# Patient Record
Sex: FEMALE | Race: BLACK OR AFRICAN AMERICAN | Employment: UNEMPLOYED | URBAN - METROPOLITAN AREA
[De-identification: names, ages, dates, MRNs, and addresses within clinical notes are randomized per-mention and may not be internally consistent; named-entity substitution may affect disease eponyms.]

---

## 2017-10-09 ENCOUNTER — HOSPITAL ENCOUNTER (EMERGENCY)
Facility: CLINIC | Age: 52
Discharge: HOME | End: 2017-10-09
Attending: EMERGENCY MEDICINE | Admitting: EMERGENCY MEDICINE
Payer: COMMERCIAL

## 2017-10-09 ENCOUNTER — APPOINTMENT (OUTPATIENT)
Dept: ULTRASOUND IMAGING | Facility: CLINIC | Age: 52
End: 2017-10-09
Attending: EMERGENCY MEDICINE
Payer: COMMERCIAL

## 2017-10-09 VITALS
HEART RATE: 61 BPM | TEMPERATURE: 97.9 F | RESPIRATION RATE: 16 BRPM | OXYGEN SATURATION: 99 % | DIASTOLIC BLOOD PRESSURE: 91 MMHG | SYSTOLIC BLOOD PRESSURE: 148 MMHG

## 2017-10-09 DIAGNOSIS — A59.00 GU INFECTION, TRICHOMONAL: ICD-10-CM

## 2017-10-09 DIAGNOSIS — R10.13 ABDOMINAL PAIN, EPIGASTRIC: ICD-10-CM

## 2017-10-09 DIAGNOSIS — R11.10 NON-INTRACTABLE VOMITING, PRESENCE OF NAUSEA NOT SPECIFIED, UNSPECIFIED VOMITING TYPE: ICD-10-CM

## 2017-10-09 LAB
ALBUMIN SERPL-MCNC: 3.9 G/DL (ref 3.4–5)
ALBUMIN UR-MCNC: NEGATIVE MG/DL
ALP SERPL-CCNC: 115 U/L (ref 40–150)
ALT SERPL W P-5'-P-CCNC: 20 U/L (ref 0–50)
AMORPH CRY #/AREA URNS HPF: ABNORMAL /HPF
ANION GAP SERPL CALCULATED.3IONS-SCNC: 7 MMOL/L (ref 3–14)
APPEARANCE UR: ABNORMAL
AST SERPL W P-5'-P-CCNC: 13 U/L (ref 0–45)
BACTERIA #/AREA URNS HPF: ABNORMAL /HPF
BASOPHILS # BLD AUTO: 0 10E9/L (ref 0–0.2)
BASOPHILS NFR BLD AUTO: 0.6 %
BILIRUB SERPL-MCNC: 0.3 MG/DL (ref 0.2–1.3)
BILIRUB UR QL STRIP: NEGATIVE
BUN SERPL-MCNC: 9 MG/DL (ref 7–30)
CALCIUM SERPL-MCNC: 9.1 MG/DL (ref 8.5–10.1)
CHLORIDE SERPL-SCNC: 104 MMOL/L (ref 94–109)
CO2 SERPL-SCNC: 28 MMOL/L (ref 20–32)
COLOR UR AUTO: YELLOW
CREAT SERPL-MCNC: 0.68 MG/DL (ref 0.52–1.04)
DIFFERENTIAL METHOD BLD: NORMAL
EOSINOPHIL # BLD AUTO: 0.1 10E9/L (ref 0–0.7)
EOSINOPHIL NFR BLD AUTO: 2.3 %
ERYTHROCYTE [DISTWIDTH] IN BLOOD BY AUTOMATED COUNT: 13 % (ref 10–15)
GFR SERPL CREATININE-BSD FRML MDRD: >90 ML/MIN/1.7M2
GLUCOSE SERPL-MCNC: 101 MG/DL (ref 70–99)
GLUCOSE UR STRIP-MCNC: NEGATIVE MG/DL
HCT VFR BLD AUTO: 41.5 % (ref 35–47)
HGB BLD-MCNC: 14.7 G/DL (ref 11.7–15.7)
HGB UR QL STRIP: ABNORMAL
IMM GRANULOCYTES # BLD: 0 10E9/L (ref 0–0.4)
IMM GRANULOCYTES NFR BLD: 0.2 %
INTERPRETATION ECG - MUSE: NORMAL
KETONES UR STRIP-MCNC: NEGATIVE MG/DL
LEUKOCYTE ESTERASE UR QL STRIP: ABNORMAL
LIPASE SERPL-CCNC: 323 U/L (ref 73–393)
LYMPHOCYTES # BLD AUTO: 1 10E9/L (ref 0.8–5.3)
LYMPHOCYTES NFR BLD AUTO: 18.7 %
MCH RBC QN AUTO: 29.5 PG (ref 26.5–33)
MCHC RBC AUTO-ENTMCNC: 35.4 G/DL (ref 31.5–36.5)
MCV RBC AUTO: 83 FL (ref 78–100)
MONOCYTES # BLD AUTO: 0.4 10E9/L (ref 0–1.3)
MONOCYTES NFR BLD AUTO: 7.3 %
MUCOUS THREADS #/AREA URNS LPF: PRESENT /LPF
NEUTROPHILS # BLD AUTO: 3.7 10E9/L (ref 1.6–8.3)
NEUTROPHILS NFR BLD AUTO: 70.9 %
NITRATE UR QL: NEGATIVE
NON-SQ EPI CELLS #/AREA URNS LPF: ABNORMAL /LPF
NRBC # BLD AUTO: 0 10*3/UL
NRBC BLD AUTO-RTO: 0 /100
PH UR STRIP: 8.5 PH (ref 5–7)
PLATELET # BLD AUTO: 219 10E9/L (ref 150–450)
POTASSIUM SERPL-SCNC: 3.4 MMOL/L (ref 3.4–5.3)
PROT SERPL-MCNC: 8.8 G/DL (ref 6.8–8.8)
RBC # BLD AUTO: 4.99 10E12/L (ref 3.8–5.2)
RBC #/AREA URNS AUTO: ABNORMAL /HPF
SODIUM SERPL-SCNC: 139 MMOL/L (ref 133–144)
SOURCE: ABNORMAL
SP GR UR STRIP: 1.02 (ref 1–1.03)
TRICHOMONAS #/AREA URNS HPF: PRESENT /HPF
TROPONIN I SERPL-MCNC: <0.015 UG/L (ref 0–0.04)
UROBILINOGEN UR STRIP-ACNC: 1 EU/DL (ref 0.2–1)
WBC # BLD AUTO: 5.2 10E9/L (ref 4–11)
WBC #/AREA URNS AUTO: ABNORMAL /HPF

## 2017-10-09 PROCEDURE — 80053 COMPREHEN METABOLIC PANEL: CPT | Performed by: EMERGENCY MEDICINE

## 2017-10-09 PROCEDURE — 96375 TX/PRO/DX INJ NEW DRUG ADDON: CPT

## 2017-10-09 PROCEDURE — 83690 ASSAY OF LIPASE: CPT | Performed by: EMERGENCY MEDICINE

## 2017-10-09 PROCEDURE — 84484 ASSAY OF TROPONIN QUANT: CPT | Performed by: EMERGENCY MEDICINE

## 2017-10-09 PROCEDURE — 25000128 H RX IP 250 OP 636: Performed by: EMERGENCY MEDICINE

## 2017-10-09 PROCEDURE — 85025 COMPLETE CBC W/AUTO DIFF WBC: CPT | Performed by: EMERGENCY MEDICINE

## 2017-10-09 PROCEDURE — 96361 HYDRATE IV INFUSION ADD-ON: CPT

## 2017-10-09 PROCEDURE — 81001 URINALYSIS AUTO W/SCOPE: CPT | Performed by: EMERGENCY MEDICINE

## 2017-10-09 PROCEDURE — 76705 ECHO EXAM OF ABDOMEN: CPT

## 2017-10-09 PROCEDURE — 99285 EMERGENCY DEPT VISIT HI MDM: CPT | Mod: 25

## 2017-10-09 PROCEDURE — 96374 THER/PROPH/DIAG INJ IV PUSH: CPT

## 2017-10-09 RX ORDER — SODIUM CHLORIDE 9 MG/ML
1000 INJECTION, SOLUTION INTRAVENOUS CONTINUOUS
Status: DISCONTINUED | OUTPATIENT
Start: 2017-10-09 | End: 2017-10-09 | Stop reason: HOSPADM

## 2017-10-09 RX ORDER — METRONIDAZOLE 500 MG/1
500 TABLET ORAL 2 TIMES DAILY
Qty: 28 TABLET | Refills: 0 | Status: SHIPPED | OUTPATIENT
Start: 2017-10-09 | End: 2017-10-23

## 2017-10-09 RX ORDER — ONDANSETRON 4 MG/1
4 TABLET, ORALLY DISINTEGRATING ORAL EVERY 6 HOURS PRN
Qty: 10 TABLET | Refills: 0 | Status: SHIPPED | OUTPATIENT
Start: 2017-10-09 | End: 2018-06-10

## 2017-10-09 RX ORDER — ONDANSETRON 2 MG/ML
4 INJECTION INTRAMUSCULAR; INTRAVENOUS ONCE
Status: COMPLETED | OUTPATIENT
Start: 2017-10-09 | End: 2017-10-09

## 2017-10-09 RX ORDER — MORPHINE SULFATE 4 MG/ML
4 INJECTION, SOLUTION INTRAMUSCULAR; INTRAVENOUS ONCE
Status: COMPLETED | OUTPATIENT
Start: 2017-10-09 | End: 2017-10-09

## 2017-10-09 RX ADMIN — ONDANSETRON 4 MG: 2 SOLUTION INTRAMUSCULAR; INTRAVENOUS at 15:32

## 2017-10-09 RX ADMIN — MORPHINE SULFATE 4 MG: 4 INJECTION, SOLUTION INTRAMUSCULAR; INTRAVENOUS at 15:33

## 2017-10-09 RX ADMIN — SODIUM CHLORIDE 1000 ML: 9 INJECTION, SOLUTION INTRAVENOUS at 15:32

## 2017-10-09 ASSESSMENT — ENCOUNTER SYMPTOMS
VOMITING: 1
PALPITATIONS: 0
ABDOMINAL PAIN: 1
SHORTNESS OF BREATH: 0

## 2017-10-09 NOTE — ED AVS SNAPSHOT
Emergency Department    [address] [facility] [address]    Phone:  [phone]    Fax:  [phone]                                       Annika Garrido   MRN: [id number]    Department:   Emergency Department   Date of Visit:  10/9/2017           Patient Information     Date Of Birth          1965        Your diagnoses for this visit were:     Non-intractable vomiting, presence of nausea not specified, unspecified vomiting type     Abdominal pain, epigastric      infection, trichomonal        You were seen by Trierweiler, Chad A, MD.      Follow-up Information     Follow up with Clinic, [facility] In 2 days.    Contact information:    [address] [facility] [address] [phone]          Follow up with  Emergency Department.    Specialty:  EMERGENCY MEDICINE    Why:  If symptoms worsen    Contact information:    [address] [facility] [address] [phone]        Discharge Instructions       Discharge Instructions  Vomiting    You have been seen today for vomiting (throwing up). This is usually caused by a virus, but some bacteria, parasites, medicines or other medical conditions can cause similar symptoms. At this time your provider does not find that your vomiting is a sign of anything dangerous or life-threatening. However, sometimes the signs of serious illness do not show up right away. If you have new or worse symptoms, you may need to be seen again in the Emergency Department or by your primary provider. Remember that serious problems like appendicitis can start as vomiting.    Generally, every Emergency Department visit should have a follow-up clinic visit with either a primary or a specialty clinic/provider. Please follow-up as instructed by your emergency provider today.    Return to the Emergency Department if:    You keep vomiting and you are not able to keep liquids down.     You feel you are getting dehydrated, such as being very  thirsty, not urinating (peeing) at least every 8-12 hours, or feeling faint or lightheaded.     You develop a new fever, or your fever continues for more than 2 days.     You have abdominal (belly pain) that seems worse than cramps, is in one spot, or is getting worse over time. Appendicitis usually causes pain in the right lower abdomen (to the right and below your belly button) so watch for pain in this location.    You have blood in your vomit or stools.     You feel very weak.    You are not starting to improve within 24 hours of your visit here.     What can I do to help myself?    The most important thing to do is to drink clear liquids. If you have been vomiting a lot, it is best to have only small, frequent sips of liquids. Drinking too much at once may cause more vomiting. If you are vomiting often, you must replace minerals, sodium and potassium lost with your illness. Pedialyte  is the best available rehydration liquid but some find that it doesn t taste good so sports drinks are an alterative. You can also drink clear liquids such as water, weak tea, apple juice, and 7-Up . Avoid acid liquids (orange), caffeine (coffee) or alcohol. Do not drink milk until you no longer have diarrhea (loose stools).     After liquids are staying down, you may start eating mild foods. Soda crackers, toast, plain noodles, gelatin, applesauce and bananas are good first choices. Avoid foods that have acid, are spicy, fatty or have a lot of fiber (such as meats, coarse grains, vegetables). You may start eating these foods again in about 3 days when you are better.     Sometimes treatment includes prescription medicine to prevent nausea (sick to your stomach) and vomiting. If your provider prescribes these for you, take them as directed.     Do not take ibuprofen, naproxen, or other nonsteroidal anti-inflammatory (NSAID) medicines without checking with your healthcare provider.     If you were given a prescription for medicine  here today, be sure to read all of the information (including the package insert) that comes with your prescription.  This will include important information about the medicine, its side effects, and any warnings that you need to know about.  The pharmacist who fills the prescription can provide more information and answer questions you may have about the medicine.  If you have questions or concerns that the pharmacist cannot address, please call or return to the Emergency Department.     Remember that you can always come back to the Emergency Department if you are not able to see your regular provider in the amount of time listed above, if you get any new symptoms, or if there is anything that worries you.      Discharge References/Attachments     GASTRITIS (ADULT) (ENGLISH)      24 Hour Appointment Hotline       To make an appointment at any [facility], call [phone] ([phone]). If you don't have a family doctor or clinic, we will help you find one. [place] clinics are conveniently located to serve the needs of you and your family.             Review of your medicines      START taking        Dose / Directions Last dose taken    metroNIDAZOLE 500 MG tablet   Commonly known as:  FLAGYL   Dose:  500 mg   Quantity:  28 tablet        Take 1 tablet (500 mg) by mouth 2 times daily for 14 days   Refills:  0        ondansetron 4 MG ODT tab   Commonly known as:  ZOFRAN ODT   Dose:  4 mg   Quantity:  10 tablet        Take 1 tablet (4 mg) by mouth every 6 hours as needed   Refills:  0                Prescriptions were sent or printed at these locations (2 Prescriptions)                   Other Prescriptions                Printed at Department/Unit printer (2 of 2)         ondansetron (ZOFRAN ODT) 4 MG ODT tab               metroNIDAZOLE (FLAGYL) 500 MG tablet                Procedures and tests performed during your visit     *UA reflex to Microscopic    Abdomen US, limited (RUQ only)    CBC with platelets  differential    Comprehensive metabolic panel    EKG 12-lead, tracing only    Lipase    Peripheral IV: Standard    Troponin I    Urine Microscopic      Orders Needing Specimen Collection     None      Pending Results     No orders found from 10/7/2017 to 10/10/2017.            Pending Culture Results     No orders found from 10/7/2017 to 10/10/2017.            Pending Results Instructions     If you had any lab results that were not finalized at the time of your Discharge, you can call the ED Lab Result RN at [phone]. You will be contacted by this team for any positive Lab results or changes in treatment. The nurses are available 7 days a week from 10A to 6:30P.  You can leave a message 24 hours per day and they will return your call.        Test Results From Your Hospital Stay        10/9/2017  3:47 PM      Component Results     Component Value Ref Range & Units Status    WBC 5.2 4.0 - 11.0 10e9/L Final    RBC Count 4.99 3.8 - 5.2 10e12/L Final    Hemoglobin 14.7 11.7 - 15.7 g/dL Final    Hematocrit 41.5 35.0 - 47.0 % Final    MCV 83 78 - 100 fl Final    MCH 29.5 26.5 - 33.0 pg Final    MCHC 35.4 31.5 - 36.5 g/dL Final    RDW 13.0 10.0 - 15.0 % Final    Platelet Count 219 150 - 450 10e9/L Final    Diff Method Automated Method  Final    % Neutrophils 70.9 % Final    % Lymphocytes 18.7 % Final    % Monocytes 7.3 % Final    % Eosinophils 2.3 % Final    % Basophils 0.6 % Final    % Immature Granulocytes 0.2 % Final    Nucleated RBCs 0 0 /100 Final    Absolute Neutrophil 3.7 1.6 - 8.3 10e9/L Final    Absolute Lymphocytes 1.0 0.8 - 5.3 10e9/L Final    Absolute Monocytes 0.4 0.0 - 1.3 10e9/L Final    Absolute Eosinophils 0.1 0.0 - 0.7 10e9/L Final    Absolute Basophils 0.0 0.0 - 0.2 10e9/L Final    Abs Immature Granulocytes 0.0 0 - 0.4 10e9/L Final    Absolute Nucleated RBC 0.0  Final         10/9/2017  4:09 PM      Component Results     Component Value Ref Range & Units Status    Sodium 139 133 - 144 mmol/L Final     Potassium 3.4 3.4 - 5.3 mmol/L Final    Chloride 104 94 - 109 mmol/L Final    Carbon Dioxide 28 20 - 32 mmol/L Final    Anion Gap 7 3 - 14 mmol/L Final    Glucose 101 (H) 70 - 99 mg/dL Final    Urea Nitrogen 9 7 - 30 mg/dL Final    Creatinine 0.68 0.52 - 1.04 mg/dL Final    GFR Estimate >90 >60 mL/min/1.7m2 Final    Non  GFR Calc    GFR Estimate If Black >90 >60 mL/min/1.7m2 Final    African American GFR Calc    Calcium 9.1 8.5 - 10.1 mg/dL Final    Bilirubin Total 0.3 0.2 - 1.3 mg/dL Final    Albumin 3.9 3.4 - 5.0 g/dL Final    Protein Total 8.8 6.8 - 8.8 g/dL Final    Alkaline Phosphatase 115 40 - 150 U/L Final    ALT 20 0 - 50 U/L Final    AST 13 0 - 45 U/L Final         10/9/2017  4:04 PM      Component Results     Component Value Ref Range & Units Status    Lipase 323 73 - 393 U/L Final         10/9/2017  4:09 PM      Component Results     Component Value Ref Range & Units Status    Troponin I ES <0.015 0.000 - 0.045 ug/L Final    The 99th percentile for upper reference range is 0.045 ug/L.  Troponin values   in the range of 0.045 - 0.120 ug/L may be associated with risks of adverse   clinical events.           10/9/2017  4:58 PM      Narrative     ULTRASOUND  ABDOMEN LIMITED 10/9/2017 4:47 PM    HISTORY: 52-year-old woman with epigastric pain.    COMPARISON: None.    FINDINGS: The visible pancreas is unremarkable. The visible abdominal  aorta and IVC unremarkable. No focal liver lesion. Liver length is  15.6 cm. The gallbladder is somewhat elongated, though not overtly  distended. Negative sonographic Arias sign. Gallbladder wall  thickness is 2 mm. No visible cholelithiasis. Common hepatic duct is  2.3 mm. The right kidney is 12.3 cm in length with AP cortical  thickness of 1.2 cm.         Impression     IMPRESSION: Normal exam.    ENA MERCHANT MD         10/9/2017  4:27 PM      Component Results     Component Value Ref Range & Units Status    Color Urine Yellow  Final    Appearance Urine  Slightly Cloudy  Final    Glucose Urine Negative NEG^Negative mg/dL Final    Bilirubin Urine Negative NEG^Negative Final    Ketones Urine Negative NEG^Negative mg/dL Final    Specific Gravity Urine 1.020 1.003 - 1.035 Final    Blood Urine Trace (A) NEG^Negative Final    pH Urine 8.5 (H) 5.0 - 7.0 pH Final    Protein Albumin Urine Negative NEG^Negative mg/dL Final    Urobilinogen Urine 1.0 0.2 - 1.0 EU/dL Final    Nitrite Urine Negative NEG^Negative Final    Leukocyte Esterase Urine Moderate (A) NEG^Negative Final    Source Midstream Urine  Final         10/9/2017  4:34 PM      Component Results     Component Value Ref Range & Units Status    WBC Urine O - 2 OTO2^O - 2 /HPF Final    RBC Urine O - 2 OTO2^O - 2 /HPF Final    Squamous Epithelial /LPF Urine Many (A) FEW^Few /LPF Final    Bacteria Urine Many (A) NEG^Negative /HPF Final    Amorphous Crystals Many (A) NEG^Negative /HPF Final    Mucous Urine Present (A) NEG^Negative /LPF Final    Trichomonas Present (A) NEG^Negative /HPF Final                Clinical Quality Measure: Blood Pressure Screening     Your blood pressure was checked while you were in the emergency department today. The last reading we obtained was  BP: (!) 157/99 . Please read the guidelines below about what these numbers mean and what you should do about them.  If your systolic blood pressure (the top number) is less than 120 and your diastolic blood pressure (the bottom number) is less than 80, then your blood pressure is normal. There is nothing more that you need to do about it.  If your systolic blood pressure (the top number) is 120-139 or your diastolic blood pressure (the bottom number) is 80-89, your blood pressure may be higher than it should be. You should have your blood pressure rechecked within a year by a primary care provider.  If your systolic blood pressure (the top number) is 140 or greater or your diastolic blood pressure (the bottom number) is 90 or greater, you may have high  "blood pressure. High blood pressure is treatable, but if left untreated over time it can put you at risk for heart attack, stroke, or kidney failure. You should have your blood pressure rechecked by a primary care provider within the next 4 weeks.  If your provider in the emergency department today gave you specific instructions to follow-up with your doctor or provider even sooner than that, you should follow that instruction and not wait for up to 4 weeks for your follow-up visit.        Thank you for choosing [facility]       Thank you for choosing [facility] for your care. Our goal is always to provide you with excellent care. Hearing back from our patients is one way we can continue to improve our services. Please take a few minutes to complete the written survey that you may receive in the mail after you visit with us. Thank you!        [employer] Information     [employer] lets you send messages to your doctor, view your test results, renew your prescriptions, schedule appointments and more. To sign up, go to [email or web address] . Click on \"Log in\" on the left side of the screen, which will take you to the Welcome page. Then click on \"Sign up Now\" on the right side of the page.     You will be asked to enter the access code listed below, as well as some personal information. Please follow the directions to create your username and password.     Your access code is: [email or web address]  Expires: 2018  5:03 PM     Your access code will  in 90 days. If you need help or a new code, please call your [facility] clinic or [phone].        Care EveryWhere ID     This is your Care EveryWhere ID. This could be used by other organizations to access your [facility] medical records  [id number]        Equal Access to Services     CASS HERNANDEZ : eneida Goodman, sana bowden. So [facility] [phone].    ATENCIÓN: Si habla español, tiene a spann " disposición servicios gratuitos de asistencia lingüística. Rajan al [phone].    We comply with applicable federal civil rights laws and [place] laws. We do not discriminate on the basis of race, color, national origin, age, disability, sex, sexual orientation, or gender identity.            After Visit Summary       This is your record. Keep this with you and show to your community pharmacist(s) and doctor(s) at your next visit.

## 2017-10-09 NOTE — LETTER
To Whom it may concern:      Annika Percy was seen in our Emergency Department today, 10/09/17.  Her  was here with her and missed work.  Please excuse him from work today.    Sincerely,        Chad A. Trierweiler, MD

## 2017-10-09 NOTE — ED AVS SNAPSHOT
Emergency Department    [address] [place] [address]    Phone:  [phone]    Fax:  [phone]                                       Annika Garrido   MRN: [id number]    Department:   Emergency Department   Date of Visit:  10/9/2017           After Visit Summary Signature Page     I have received my discharge instructions, and my questions have been answered. I have discussed any challenges I see with this plan with the nurse or doctor.    ..........................................................................................................................................  Patient/Patient Representative Signature      ..........................................................................................................................................  Patient Representative Print Name and Relationship to Patient    ..................................................               ................................................  Date                                            Time    ..........................................................................................................................................  Reviewed by Signature/Title    ...................................................              ..............................................  Date                                                            Time

## 2017-10-09 NOTE — DISCHARGE INSTRUCTIONS
Discharge Instructions  Vomiting    You have been seen today for vomiting (throwing up). This is usually caused by a virus, but some bacteria, parasites, medicines or other medical conditions can cause similar symptoms. At this time your provider does not find that your vomiting is a sign of anything dangerous or life-threatening. However, sometimes the signs of serious illness do not show up right away. If you have new or worse symptoms, you may need to be seen again in the Emergency Department or by your primary provider. Remember that serious problems like appendicitis can start as vomiting.    Generally, every Emergency Department visit should have a follow-up clinic visit with either a primary or a specialty clinic/provider. Please follow-up as instructed by your emergency provider today.    Return to the Emergency Department if:    You keep vomiting and you are not able to keep liquids down.     You feel you are getting dehydrated, such as being very thirsty, not urinating (peeing) at least every 8-12 hours, or feeling faint or lightheaded.     You develop a new fever, or your fever continues for more than 2 days.     You have abdominal (belly pain) that seems worse than cramps, is in one spot, or is getting worse over time. Appendicitis usually causes pain in the right lower abdomen (to the right and below your belly button) so watch for pain in this location.    You have blood in your vomit or stools.     You feel very weak.    You are not starting to improve within 24 hours of your visit here.     What can I do to help myself?    The most important thing to do is to drink clear liquids. If you have been vomiting a lot, it is best to have only small, frequent sips of liquids. Drinking too much at once may cause more vomiting. If you are vomiting often, you must replace minerals, sodium and potassium lost with your illness. Pedialyte  is the best available rehydration liquid but some find that it doesn t  taste good so sports drinks are an alterative. You can also drink clear liquids such as water, weak tea, apple juice, and 7-Up . Avoid acid liquids (orange), caffeine (coffee) or alcohol. Do not drink milk until you no longer have diarrhea (loose stools).     After liquids are staying down, you may start eating mild foods. Soda crackers, toast, plain noodles, gelatin, applesauce and bananas are good first choices. Avoid foods that have acid, are spicy, fatty or have a lot of fiber (such as meats, coarse grains, vegetables). You may start eating these foods again in about 3 days when you are better.     Sometimes treatment includes prescription medicine to prevent nausea (sick to your stomach) and vomiting. If your provider prescribes these for you, take them as directed.     Do not take ibuprofen, naproxen, or other nonsteroidal anti-inflammatory (NSAID) medicines without checking with your healthcare provider.     If you were given a prescription for medicine here today, be sure to read all of the information (including the package insert) that comes with your prescription.  This will include important information about the medicine, its side effects, and any warnings that you need to know about.  The pharmacist who fills the prescription can provide more information and answer questions you may have about the medicine.  If you have questions or concerns that the pharmacist cannot address, please call or return to the Emergency Department.     Remember that you can always come back to the Emergency Department if you are not able to see your regular provider in the amount of time listed above, if you get any new symptoms, or if there is anything that worries you.

## 2017-10-09 NOTE — ED PROVIDER NOTES
"  History     Chief Complaint:  Epigastric Pain     HPI   Annika Garrido is a 52 year old female who presents to the emergency department today for evaluation of epigastric pain. The patient reports that she had a very busy day and then this afternoon at 1300 she developed some epigastric pain and voimting while she was resting on her couch. She characterizes her pain as a \"squeezing and stabbing\" that radiates from her epigastrium up her chest and through to her back. Here she also indicates some right upper quadrant abdominal pain. She cannot specify any exacerbating or alleviating positions or factors regarding her chest pain. She denies any shortness of breath or palpitations but notes that she has vomited eight times since her chest pain began. Of note, the patient used cocaine last night and had a hot dog and a Honey Bun for breakfast.     Allergies:  No Known Drug Allergies    Medications:    Medications reviewed. No current medications.      Past Medical History:    Asymptomatic human immunodeficiency virus (HIV) infection status    Past Surgical History:    History reviewed. No pertinent surgical history.    Family History:    History reviewed. No pertinent family history.     Social History:  Smoking Status: Current Every Day Smoker -- 0.25 Packs Per Day   Smokeless Tobacco: Never Used  Alcohol Use: Negative     Review of Systems   Respiratory: Negative for shortness of breath.    Cardiovascular: Positive for chest pain (Radiating to her back). Negative for palpitations.   Gastrointestinal: Positive for abdominal pain (RUQ) and vomiting.   All other systems reviewed and are negative.    Physical Exam     Patient Vitals for the past 24 hrs:   BP Temp Temp src Heart Rate Resp SpO2   10/09/17 1453 (!) 157/99 97.9  F (36.6  C) Oral 64 16 99 %     Physical Exam  Eye:  Pupils are equal, round, and reactive.  Extraocular movements intact.    ENT:  No rhinorrhea.  Moist mucus membranes.  Normal tongue and " tonsil.    Cardiac:  Regular rate and rhythm.  No murmurs, gallops, or rubs.    Pulmonary:  Clear to auscultation bilaterally.  No wheezes, rales, or rhonchi.    Abdomen:  Focal tenderness in the epigastrium and right upper quadrant without rebound or guarding.     Musculoskeletal:  Normal movement of all extremities without evidence for deficit.    Skin:  Warm and dry without rashes.    Neurologic:  Non-focal exam without asymmetric weakness or numbness.     Psychiatric:  Normal affect with appropriate interaction with examiner.    Emergency Department Course     ECG:  ECG taken at 1502, ECG read at 1513  Sinus rhythm  Left axis deviation  Voltage criteria for left ventricular hypertrophy  Abnormal ECG  Rate 67 bpm. MI interval 160 ms. QRS duration 104 ms. QT/QTc 418/441 ms. P-R-T axes 51 -32 -1.    Imaging:  Radiology findings were communicated with the patient who voiced understanding of the findings.    Abdomen US, Limited (RUQ only)  Normal exam.  Reading per radiology    Laboratory:  Laboratory findings were communicated with the patient who voiced understanding of the findings.    UA: Blood: Trace (A), pH 8.5 (H), Leukocyte Esterase: Moderate (A)  Urine Microscopic: Squamous Epithelial/LPF: Many (A), Bacteria: Many (A), Amorphous Crystals: Many (A), Mucous: Present (A), Trichomonas: Present (A)   CBC: WBC 5.2, HGB 14.7,   CMP: Glucose 101 (H) o/w WNL (Creatinine 0.68)  Lipase: 323   Troponin I (Collected 1526): <0.015      Interventions:  1532 NS 1000 ml IV   1532 Zofran 4 mg IV   1533 Morphine 4 mg IV    Medications   0.9% sodium chloride BOLUS (1,000 mLs Intravenous New Bag 10/9/17 1532)     Followed by   0.9% sodium chloride infusion (not administered)   morphine (PF) injection 4 mg (4 mg Intravenous Given 10/9/17 1533)   ondansetron (ZOFRAN) injection 4 mg (4 mg Intravenous Given 10/9/17 1532)     Emergency Department Course:    1500 Nursing notes and vitals reviewed.    1513 I performed an exam of  "the patient as documented above.     1534 IV was inserted and blood was drawn for laboratory testing, results above.     1621 The patient was rechecked and updated.     1621 The patient provided a urine sample here in the emergency department. This was sent for laboratory testing, findings above.     1635 The patient was sent for a Abdomen US, limited (RUQ only) while in the emergency department, results above.      I personally reviewed the EKG, laboratory, and imaging results with the patient and answered all related questions prior to discharge.    Impression & Plan      Medical Decision Making:  Annika Garrido is a 52 year old female who presents to the emergency department today for evaluation of fairly sudden vomiting and epigastric pain.  The patient notes that she had a night of partying with her friends.  Upon awakening this morning, she ate \"an old hotdog\" along with a \"honeybun\" and took the bus home.  However, when she arrived home, she suddenly felt very ill with multiple episodes of vomiting and epigastric pain that radiated through her chest.  She was rather uncomfortable on initial assessment with retching.  However, she felt much improved after 1 dose of morphine and Zofran.  On exam, she is only tender in the epigastrium and right upper quadrant.  She has an unremarkable EKG and troponin despite 3 hours of symptoms, and I feel that she would not require further workup for cardiac pathologies.  I also feel PE is unlikely.  She is over the age of 50 and just cannot be ruled out by the PERC role, though I feel she is so low risk that she would not require a d-dimer.  Aortic dissection is also unlikely considering her presentation.  I was more concerned about gastric and biliary causes.  Lipase is normal.  LFTs are normal.  Right upper quadrant ultrasound is negative.  The patient felt much improved and feels comfortable with the plan for discharge home.  My leading supposition is one of food poisoning " considering that she was eating foods that could've been left out overnight after a night of partying.  Nonetheless, at this juncture she feels safe discharge and I feel that is reasonable.  However, I advised close outpatient follow-up as there is some diagnostic uncertainty here and she understands that should be no hesitation to return to us immediately for any worsening of condition or other emergent concerns.    Diagnosis:    ICD-10-CM    1. Non-intractable vomiting, presence of nausea not specified, unspecified vomiting type R11.10    2. Abdominal pain, epigastric R10.13      Disposition:   Discharge to home    Discharge Medications:  None  New Prescriptions    No medications on file     Scribe Disclosure:  I, Clive Shields, am serving as a scribe at 3:11 PM on 10/9/2017 to document services personally performed by Trierweiler, Chad A, MD, based on my observations and the provider's statements to me.     EMERGENCY DEPARTMENT       Trierweiler, Chad A, MD  10/09/17 4814

## 2018-03-07 ENCOUNTER — OFFICE VISIT (OUTPATIENT)
Dept: OBGYN | Facility: CLINIC | Age: 53
End: 2018-03-07
Payer: COMMERCIAL

## 2018-03-07 DIAGNOSIS — Z00.6 EXAMINATION OF PARTICIPANT IN CLINICAL TRIAL: Primary | ICD-10-CM

## 2018-03-07 NOTE — PROGRESS NOTES
This patient was seen as part of a research study.  Vaginal and cervical swabs were obtained as well as two cervical biopsies.  Bleeding was minimal and controlled with silver nitrate.  The patient tolerated the procedure well.  Dr. Plasencia was present for the entire procedure.    Amy Schumer, MD  Obstetrics and Gynecology, PGY-1    I was present for procedure for research study.   Vicki Plasencia MD

## 2018-03-07 NOTE — MR AVS SNAPSHOT
After Visit Summary   3/7/2018    Annika Garrido    MRN: [id number]           Patient Information     Date Of Birth          1965        Visit Information        Provider Department      3/7/2018 1:15 PM Schumer, Amy, MD [facility]        Today's Diagnoses     Examination of participant in clinical trial    -  1       Follow-ups after your visit        Who to contact     Please call your clinic at [phone] to:    Ask questions about your health    Make or cancel appointments    Discuss your medicines    Learn about your test results    Speak to your doctor            Additional Information About Your Visit        MyChart Information     [employer] is an electronic gateway that provides easy, online access to your medical records. With [employer], you can request a clinic appointment, read your test results, renew a prescription or communicate with your care team.     To sign up for [employer] visit the website at [email or web address]   You will be asked to enter the access code listed below, as well as some personal information. Please follow the directions to create your username and password.     Your access code is: [email or web address]  Expires: 5/15/2018  6:30 AM     Your access code will  in 90 days. If you need help or a new code, please contact your [facility] or call [phone] for assistance.        Care EveryWhere ID     This is your Care EveryWhere ID. This could be used by other organizations to access your [place] medical records  [id number]         Blood Pressure from Last 3 Encounters:   10/09/17 (!) 148/91    Weight from Last 3 Encounters:   No data found for Wt              Today, you had the following     No orders found for display       Primary Care Provider Office Phone # Fax #    [facility] [phone]       [address] [place] [id number]        Equal Access to Services      CASS HERNANDEZ : Hadii aad ku dora José, waaxda luqadaha, qaybta kaalmada alexis, sana amrik noelrosa quinnrajendrajuan harman . So [facility] [phone].    ATENCIÓN: Si habla español, tiene a spann disposición servicios gratuitos de asistencia lingüística. Llame al [phone].    We comply with applicable federal civil rights laws and [place] laws. We do not discriminate on the basis of race, color, national origin, age, disability, sex, sexual orientation, or gender identity.            Thank you!     Thank you for choosing [facility]  for your care. Our goal is always to provide you with excellent care. Hearing back from our patients is one way we can continue to improve our services. Please take a few minutes to complete the written survey that you may receive in the mail after your visit with us. Thank you!             Your Updated Medication List - Protect others around you: Learn how to safely use, store and throw away your medicines at www.disposemymeds.org.          This list is accurate as of 3/7/18 11:59 PM.  Always use your most recent med list.                   Brand Name Dispense Instructions for use Diagnosis    ondansetron 4 MG ODT tab    ZOFRAN ODT    10 tablet    Take 1 tablet (4 mg) by mouth every 6 hours as needed

## 2018-06-10 ENCOUNTER — APPOINTMENT (OUTPATIENT)
Dept: GENERAL RADIOLOGY | Facility: CLINIC | Age: 53
DRG: 459 | End: 2018-06-10
Attending: NEUROLOGICAL SURGERY
Payer: COMMERCIAL

## 2018-06-10 ENCOUNTER — HOSPITAL ENCOUNTER (INPATIENT)
Facility: CLINIC | Age: 53
LOS: 6 days | Discharge: NURSING, REHAB OR LONG-TERM CARE | DRG: 459 | End: 2018-06-16
Attending: EMERGENCY MEDICINE | Admitting: INTERNAL MEDICINE
Payer: COMMERCIAL

## 2018-06-10 ENCOUNTER — APPOINTMENT (OUTPATIENT)
Dept: MRI IMAGING | Facility: CLINIC | Age: 53
DRG: 459 | End: 2018-06-10
Attending: EMERGENCY MEDICINE
Payer: COMMERCIAL

## 2018-06-10 ENCOUNTER — SURGERY (OUTPATIENT)
Age: 53
End: 2018-06-10

## 2018-06-10 ENCOUNTER — ANESTHESIA (OUTPATIENT)
Dept: SURGERY | Facility: CLINIC | Age: 53
DRG: 459 | End: 2018-06-10
Payer: COMMERCIAL

## 2018-06-10 ENCOUNTER — ANESTHESIA EVENT (OUTPATIENT)
Dept: SURGERY | Facility: CLINIC | Age: 53
DRG: 459 | End: 2018-06-10
Payer: COMMERCIAL

## 2018-06-10 DIAGNOSIS — Z98.1 S/P LUMBAR FUSION: Primary | ICD-10-CM

## 2018-06-10 DIAGNOSIS — R33.9 URINARY RETENTION: ICD-10-CM

## 2018-06-10 DIAGNOSIS — F41.9 ANXIETY DISORDER, UNSPECIFIED TYPE: ICD-10-CM

## 2018-06-10 DIAGNOSIS — F17.200 TOBACCO USE DISORDER: ICD-10-CM

## 2018-06-10 DIAGNOSIS — M54.50 ACUTE BILATERAL LOW BACK PAIN WITHOUT SCIATICA: ICD-10-CM

## 2018-06-10 DIAGNOSIS — G83.4 CAUDA EQUINA COMPRESSION (H): ICD-10-CM

## 2018-06-10 LAB
ABO + RH BLD: NORMAL
ABO + RH BLD: NORMAL
ALBUMIN SERPL-MCNC: 3.7 G/DL (ref 3.4–5)
ALBUMIN UR-MCNC: NEGATIVE MG/DL
ALP SERPL-CCNC: 100 U/L (ref 40–150)
ALT SERPL W P-5'-P-CCNC: 21 U/L (ref 0–50)
AMPHETAMINES UR QL SCN: NEGATIVE
ANION GAP SERPL CALCULATED.3IONS-SCNC: 5 MMOL/L (ref 3–14)
APPEARANCE UR: CLEAR
AST SERPL W P-5'-P-CCNC: 18 U/L (ref 0–45)
BARBITURATES UR QL: NEGATIVE
BASOPHILS # BLD AUTO: 0 10E9/L (ref 0–0.2)
BASOPHILS NFR BLD AUTO: 1 %
BENZODIAZ UR QL: NEGATIVE
BILIRUB SERPL-MCNC: 0.3 MG/DL (ref 0.2–1.3)
BILIRUB UR QL STRIP: NEGATIVE
BLD GP AB SCN SERPL QL: NORMAL
BLOOD BANK CMNT PATIENT-IMP: NORMAL
BUN SERPL-MCNC: 12 MG/DL (ref 7–30)
CALCIUM SERPL-MCNC: 9 MG/DL (ref 8.5–10.1)
CANNABINOIDS UR QL SCN: NEGATIVE
CHLORIDE SERPL-SCNC: 112 MMOL/L (ref 94–109)
CO2 SERPL-SCNC: 26 MMOL/L (ref 20–32)
COCAINE UR QL: POSITIVE
COLOR UR AUTO: NORMAL
CREAT SERPL-MCNC: 0.76 MG/DL (ref 0.52–1.04)
DIFFERENTIAL METHOD BLD: NORMAL
EOSINOPHIL # BLD AUTO: 0.2 10E9/L (ref 0–0.7)
EOSINOPHIL NFR BLD AUTO: 4.3 %
ERYTHROCYTE [DISTWIDTH] IN BLOOD BY AUTOMATED COUNT: 13.2 % (ref 10–15)
GFR SERPL CREATININE-BSD FRML MDRD: 80 ML/MIN/1.7M2
GLUCOSE SERPL-MCNC: 90 MG/DL (ref 70–99)
GLUCOSE UR STRIP-MCNC: NEGATIVE MG/DL
HCG UR QL: NEGATIVE
HCT VFR BLD AUTO: 39.8 % (ref 35–47)
HGB BLD-MCNC: 13.8 G/DL (ref 11.7–15.7)
HGB UR QL STRIP: NEGATIVE
IMM GRANULOCYTES # BLD: 0 10E9/L (ref 0–0.4)
IMM GRANULOCYTES NFR BLD: 0.3 %
INTERPRETATION ECG - MUSE: NORMAL
KETONES UR STRIP-MCNC: NEGATIVE MG/DL
LEUKOCYTE ESTERASE UR QL STRIP: NEGATIVE
LYMPHOCYTES # BLD AUTO: 1.2 10E9/L (ref 0.8–5.3)
LYMPHOCYTES NFR BLD AUTO: 30.3 %
MCH RBC QN AUTO: 28.5 PG (ref 26.5–33)
MCHC RBC AUTO-ENTMCNC: 34.7 G/DL (ref 31.5–36.5)
MCV RBC AUTO: 82 FL (ref 78–100)
MONOCYTES # BLD AUTO: 0.4 10E9/L (ref 0–1.3)
MONOCYTES NFR BLD AUTO: 10 %
NEUTROPHILS # BLD AUTO: 2.2 10E9/L (ref 1.6–8.3)
NEUTROPHILS NFR BLD AUTO: 54.1 %
NITRATE UR QL: NEGATIVE
NRBC # BLD AUTO: 0 10*3/UL
NRBC BLD AUTO-RTO: 0 /100
OPIATES UR QL SCN: NEGATIVE
PCP UR QL SCN: NEGATIVE
PH UR STRIP: 5.5 PH (ref 5–7)
PLATELET # BLD AUTO: 192 10E9/L (ref 150–450)
POTASSIUM SERPL-SCNC: 3.5 MMOL/L (ref 3.4–5.3)
PROT SERPL-MCNC: 8.3 G/DL (ref 6.8–8.8)
RBC # BLD AUTO: 4.84 10E12/L (ref 3.8–5.2)
RBC #/AREA URNS AUTO: <1 /HPF (ref 0–2)
SODIUM SERPL-SCNC: 143 MMOL/L (ref 133–144)
SOURCE: NORMAL
SP GR UR STRIP: 1.01 (ref 1–1.03)
SPECIMEN EXP DATE BLD: NORMAL
SQUAMOUS #/AREA URNS AUTO: <1 /HPF (ref 0–1)
UROBILINOGEN UR STRIP-MCNC: NORMAL MG/DL (ref 0–2)
WBC # BLD AUTO: 4 10E9/L (ref 4–11)
WBC #/AREA URNS AUTO: 1 /HPF (ref 0–5)

## 2018-06-10 PROCEDURE — 96374 THER/PROPH/DIAG INJ IV PUSH: CPT

## 2018-06-10 PROCEDURE — 0ST40ZZ RESECTION OF LUMBOSACRAL DISC, OPEN APPROACH: ICD-10-PCS | Performed by: NEUROLOGICAL SURGERY

## 2018-06-10 PROCEDURE — 25000125 ZZHC RX 250: Performed by: NEUROLOGICAL SURGERY

## 2018-06-10 PROCEDURE — 27210995 ZZH RX 272: Performed by: NEUROLOGICAL SURGERY

## 2018-06-10 PROCEDURE — 72148 MRI LUMBAR SPINE W/O DYE: CPT

## 2018-06-10 PROCEDURE — 27210794 ZZH OR GENERAL SUPPLY STERILE: Performed by: NEUROLOGICAL SURGERY

## 2018-06-10 PROCEDURE — 37000008 ZZH ANESTHESIA TECHNICAL FEE, 1ST 30 MIN: Performed by: NEUROLOGICAL SURGERY

## 2018-06-10 PROCEDURE — 22633 ARTHRD CMBN 1NTRSPC LUMBAR: CPT | Performed by: NEUROLOGICAL SURGERY

## 2018-06-10 PROCEDURE — 93005 ELECTROCARDIOGRAM TRACING: CPT

## 2018-06-10 PROCEDURE — 25000128 H RX IP 250 OP 636: Performed by: NEUROLOGICAL SURGERY

## 2018-06-10 PROCEDURE — 99207 ZZC CDG-HISTORY COMP: MEETS EXP. PROB FOCUSED- DOWN CODED LACK OF PFSH: CPT | Performed by: INTERNAL MEDICINE

## 2018-06-10 PROCEDURE — 71000012 ZZH RECOVERY PHASE 1 LEVEL 1 FIRST HR: Performed by: NEUROLOGICAL SURGERY

## 2018-06-10 PROCEDURE — 36000077 ZZH SURGERY LEVEL 6 W FLUORO 1ST 30 MIN: Performed by: NEUROLOGICAL SURGERY

## 2018-06-10 PROCEDURE — 25000128 H RX IP 250 OP 636: Performed by: EMERGENCY MEDICINE

## 2018-06-10 PROCEDURE — C1713 ANCHOR/SCREW BN/BN,TIS/BN: HCPCS | Performed by: NEUROLOGICAL SURGERY

## 2018-06-10 PROCEDURE — 85025 COMPLETE CBC W/AUTO DIFF WBC: CPT | Performed by: EMERGENCY MEDICINE

## 2018-06-10 PROCEDURE — 25000566 ZZH SEVOFLURANE, EA 15 MIN: Performed by: NEUROLOGICAL SURGERY

## 2018-06-10 PROCEDURE — 25000301 ZZH OR RX SURGIFLO W/THROMBIN KIT 2ML 1991 OPNP: Performed by: NEUROLOGICAL SURGERY

## 2018-06-10 PROCEDURE — 81001 URINALYSIS AUTO W/SCOPE: CPT | Performed by: EMERGENCY MEDICINE

## 2018-06-10 PROCEDURE — 25000128 H RX IP 250 OP 636: Performed by: ANESTHESIOLOGY

## 2018-06-10 PROCEDURE — 99221 1ST HOSP IP/OBS SF/LOW 40: CPT | Mod: AI | Performed by: INTERNAL MEDICINE

## 2018-06-10 PROCEDURE — 86900 BLOOD TYPING SEROLOGIC ABO: CPT | Performed by: EMERGENCY MEDICINE

## 2018-06-10 PROCEDURE — 0SG30AJ FUSION OF LUMBOSACRAL JOINT WITH INTERBODY FUSION DEVICE, POSTERIOR APPROACH, ANTERIOR COLUMN, OPEN APPROACH: ICD-10-PCS | Performed by: NEUROLOGICAL SURGERY

## 2018-06-10 PROCEDURE — 25000128 H RX IP 250 OP 636: Performed by: INTERNAL MEDICINE

## 2018-06-10 PROCEDURE — 25000128 H RX IP 250 OP 636: Performed by: NURSE ANESTHETIST, CERTIFIED REGISTERED

## 2018-06-10 PROCEDURE — 86901 BLOOD TYPING SEROLOGIC RH(D): CPT | Performed by: EMERGENCY MEDICINE

## 2018-06-10 PROCEDURE — 80053 COMPREHEN METABOLIC PANEL: CPT | Performed by: EMERGENCY MEDICINE

## 2018-06-10 PROCEDURE — 96375 TX/PRO/DX INJ NEW DRUG ADDON: CPT

## 2018-06-10 PROCEDURE — 81025 URINE PREGNANCY TEST: CPT | Performed by: EMERGENCY MEDICINE

## 2018-06-10 PROCEDURE — 22840 INSERT SPINE FIXATION DEVICE: CPT | Performed by: NEUROLOGICAL SURGERY

## 2018-06-10 PROCEDURE — 25000132 ZZH RX MED GY IP 250 OP 250 PS 637: Performed by: INTERNAL MEDICINE

## 2018-06-10 PROCEDURE — 40000277 XR SURGERY CARM FLUORO LESS THAN 5 MIN W STILLS

## 2018-06-10 PROCEDURE — 36000075 ZZH SURGERY LEVEL 6 EA 15 ADDTL MIN: Performed by: NEUROLOGICAL SURGERY

## 2018-06-10 PROCEDURE — 63047 LAM FACETEC & FORAMOT LUMBAR: CPT | Mod: 59 | Performed by: NEUROLOGICAL SURGERY

## 2018-06-10 PROCEDURE — C1763 CONN TISS, NON-HUMAN: HCPCS | Performed by: NEUROLOGICAL SURGERY

## 2018-06-10 PROCEDURE — 37000009 ZZH ANESTHESIA TECHNICAL FEE, EACH ADDTL 15 MIN: Performed by: NEUROLOGICAL SURGERY

## 2018-06-10 PROCEDURE — 22853 INSJ BIOMECHANICAL DEVICE: CPT | Performed by: NEUROLOGICAL SURGERY

## 2018-06-10 PROCEDURE — 25000125 ZZHC RX 250: Performed by: NURSE ANESTHETIST, CERTIFIED REGISTERED

## 2018-06-10 PROCEDURE — 8E0WXBF COMPUTER ASSISTED PROCEDURE OF TRUNK REGION, WITH FLUOROSCOPY: ICD-10-PCS | Performed by: NEUROLOGICAL SURGERY

## 2018-06-10 PROCEDURE — 40000170 ZZH STATISTIC PRE-PROCEDURE ASSESSMENT II: Performed by: NEUROLOGICAL SURGERY

## 2018-06-10 PROCEDURE — 86850 RBC ANTIBODY SCREEN: CPT | Performed by: EMERGENCY MEDICINE

## 2018-06-10 PROCEDURE — 12000007 ZZH R&B INTERMEDIATE

## 2018-06-10 PROCEDURE — 99285 EMERGENCY DEPT VISIT HI MDM: CPT | Mod: 25

## 2018-06-10 PROCEDURE — 99223 1ST HOSP IP/OBS HIGH 75: CPT | Mod: 57 | Performed by: NEUROLOGICAL SURGERY

## 2018-06-10 PROCEDURE — 80307 DRUG TEST PRSMV CHEM ANLYZR: CPT | Performed by: EMERGENCY MEDICINE

## 2018-06-10 PROCEDURE — 25000125 ZZHC RX 250: Performed by: SURGERY

## 2018-06-10 DEVICE — IMP SCR STRK XIA 3 POLYAXIAL 6.5X40MM TI 482316540: Type: IMPLANTABLE DEVICE | Site: SPINE LUMBAR | Status: FUNCTIONAL

## 2018-06-10 RX ORDER — PROPOFOL 10 MG/ML
INJECTION, EMULSION INTRAVENOUS PRN
Status: DISCONTINUED | OUTPATIENT
Start: 2018-06-10 | End: 2018-06-10

## 2018-06-10 RX ORDER — CLOTRIMAZOLE 1 %
CREAM (GRAM) TOPICAL 2 TIMES DAILY
COMMUNITY
End: 2020-05-04

## 2018-06-10 RX ORDER — AMOXICILLIN 250 MG
1 CAPSULE ORAL 2 TIMES DAILY PRN
Status: DISCONTINUED | OUTPATIENT
Start: 2018-06-10 | End: 2018-06-12

## 2018-06-10 RX ORDER — MAGNESIUM SULFATE HEPTAHYDRATE 40 MG/ML
4 INJECTION, SOLUTION INTRAVENOUS EVERY 4 HOURS PRN
Status: DISCONTINUED | OUTPATIENT
Start: 2018-06-10 | End: 2018-06-16 | Stop reason: HOSPADM

## 2018-06-10 RX ORDER — DEXAMETHASONE SODIUM PHOSPHATE 4 MG/ML
INJECTION, SOLUTION INTRA-ARTICULAR; INTRALESIONAL; INTRAMUSCULAR; INTRAVENOUS; SOFT TISSUE PRN
Status: DISCONTINUED | OUTPATIENT
Start: 2018-06-10 | End: 2018-06-10

## 2018-06-10 RX ORDER — ACETAMINOPHEN 325 MG/1
975 TABLET ORAL EVERY 8 HOURS
Status: DISPENSED | OUTPATIENT
Start: 2018-06-10 | End: 2018-06-13

## 2018-06-10 RX ORDER — HYDRALAZINE HYDROCHLORIDE 20 MG/ML
10 INJECTION INTRAMUSCULAR; INTRAVENOUS EVERY 4 HOURS PRN
Status: DISCONTINUED | OUTPATIENT
Start: 2018-06-10 | End: 2018-06-16 | Stop reason: HOSPADM

## 2018-06-10 RX ORDER — CLONAZEPAM 1 MG/1
2 TABLET ORAL EVERY MORNING
Status: DISCONTINUED | OUTPATIENT
Start: 2018-06-10 | End: 2018-06-12

## 2018-06-10 RX ORDER — HYDROCODONE BITARTRATE AND ACETAMINOPHEN 5; 325 MG/1; MG/1
1 TABLET ORAL EVERY 8 HOURS PRN
Status: ON HOLD | COMMUNITY
End: 2018-06-15

## 2018-06-10 RX ORDER — ACETAMINOPHEN 325 MG/1
650 TABLET ORAL EVERY 4 HOURS PRN
Status: DISCONTINUED | OUTPATIENT
Start: 2018-06-13 | End: 2018-06-16 | Stop reason: HOSPADM

## 2018-06-10 RX ORDER — CLONAZEPAM 1 MG/1
1 TABLET ORAL AT BEDTIME
Status: DISCONTINUED | OUTPATIENT
Start: 2018-06-10 | End: 2018-06-16 | Stop reason: HOSPADM

## 2018-06-10 RX ORDER — POTASSIUM CL/LIDO/0.9 % NACL 10MEQ/0.1L
10 INTRAVENOUS SOLUTION, PIGGYBACK (ML) INTRAVENOUS
Status: DISCONTINUED | OUTPATIENT
Start: 2018-06-10 | End: 2018-06-16 | Stop reason: HOSPADM

## 2018-06-10 RX ORDER — HYDROMORPHONE HYDROCHLORIDE 1 MG/ML
0.5 INJECTION, SOLUTION INTRAMUSCULAR; INTRAVENOUS; SUBCUTANEOUS
Status: COMPLETED | OUTPATIENT
Start: 2018-06-10 | End: 2018-06-10

## 2018-06-10 RX ORDER — ONDANSETRON 2 MG/ML
4 INJECTION INTRAMUSCULAR; INTRAVENOUS EVERY 6 HOURS PRN
Status: DISCONTINUED | OUTPATIENT
Start: 2018-06-10 | End: 2018-06-10

## 2018-06-10 RX ORDER — METOCLOPRAMIDE 10 MG/1
10 TABLET ORAL EVERY 6 HOURS PRN
Status: DISCONTINUED | OUTPATIENT
Start: 2018-06-10 | End: 2018-06-16 | Stop reason: HOSPADM

## 2018-06-10 RX ORDER — POLYETHYLENE GLYCOL 3350 17 G
2 POWDER IN PACKET (EA) ORAL
Status: DISCONTINUED | OUTPATIENT
Start: 2018-06-10 | End: 2018-06-16 | Stop reason: HOSPADM

## 2018-06-10 RX ORDER — CEFAZOLIN SODIUM 2 G/100ML
2 INJECTION, SOLUTION INTRAVENOUS
Status: COMPLETED | OUTPATIENT
Start: 2018-06-10 | End: 2018-06-10

## 2018-06-10 RX ORDER — AMOXICILLIN 250 MG
2 CAPSULE ORAL 2 TIMES DAILY
Status: DISCONTINUED | OUTPATIENT
Start: 2018-06-10 | End: 2018-06-12

## 2018-06-10 RX ORDER — CICLOPIROX 80 MG/ML
SOLUTION TOPICAL
COMMUNITY
End: 2020-05-04

## 2018-06-10 RX ORDER — HYDROMORPHONE HYDROCHLORIDE 1 MG/ML
.3-.5 INJECTION, SOLUTION INTRAMUSCULAR; INTRAVENOUS; SUBCUTANEOUS
Status: DISCONTINUED | OUTPATIENT
Start: 2018-06-10 | End: 2018-06-12

## 2018-06-10 RX ORDER — ALBUTEROL SULFATE 0.83 MG/ML
2.5 SOLUTION RESPIRATORY (INHALATION) ONCE
Status: COMPLETED | OUTPATIENT
Start: 2018-06-10 | End: 2018-06-10

## 2018-06-10 RX ORDER — SODIUM CHLORIDE AND POTASSIUM CHLORIDE 150; 900 MG/100ML; MG/100ML
INJECTION, SOLUTION INTRAVENOUS CONTINUOUS
Status: DISCONTINUED | OUTPATIENT
Start: 2018-06-10 | End: 2018-06-14

## 2018-06-10 RX ORDER — AMOXICILLIN 250 MG
1 CAPSULE ORAL 2 TIMES DAILY
Status: DISCONTINUED | OUTPATIENT
Start: 2018-06-10 | End: 2018-06-12

## 2018-06-10 RX ORDER — GLYCOPYRROLATE 0.2 MG/ML
INJECTION, SOLUTION INTRAMUSCULAR; INTRAVENOUS PRN
Status: DISCONTINUED | OUTPATIENT
Start: 2018-06-10 | End: 2018-06-10

## 2018-06-10 RX ORDER — PROCHLORPERAZINE 25 MG
25 SUPPOSITORY, RECTAL RECTAL EVERY 12 HOURS PRN
Status: DISCONTINUED | OUTPATIENT
Start: 2018-06-10 | End: 2018-06-16 | Stop reason: HOSPADM

## 2018-06-10 RX ORDER — BUPIVACAINE HYDROCHLORIDE AND EPINEPHRINE 5; 5 MG/ML; UG/ML
INJECTION, SOLUTION PERINEURAL PRN
Status: DISCONTINUED | OUTPATIENT
Start: 2018-06-10 | End: 2018-06-10 | Stop reason: HOSPADM

## 2018-06-10 RX ORDER — ALBUTEROL SULFATE 90 UG/1
1-2 AEROSOL, METERED RESPIRATORY (INHALATION) EVERY 4 HOURS PRN
COMMUNITY

## 2018-06-10 RX ORDER — EMTRICITABINE AND TENOFOVIR DISOPROXIL FUMARATE 200; 300 MG/1; MG/1
1 TABLET, FILM COATED ORAL DAILY
COMMUNITY
End: 2020-05-27

## 2018-06-10 RX ORDER — LABETALOL HYDROCHLORIDE 5 MG/ML
10 INJECTION, SOLUTION INTRAVENOUS
Status: DISCONTINUED | OUTPATIENT
Start: 2018-06-10 | End: 2018-06-10

## 2018-06-10 RX ORDER — ACETAMINOPHEN 325 MG/1
650 TABLET ORAL EVERY 4 HOURS PRN
Status: DISCONTINUED | OUTPATIENT
Start: 2018-06-10 | End: 2018-06-10

## 2018-06-10 RX ORDER — POLYETHYLENE GLYCOL 3350 17 G/17G
17 POWDER, FOR SOLUTION ORAL DAILY PRN
Status: DISCONTINUED | OUTPATIENT
Start: 2018-06-10 | End: 2018-06-12

## 2018-06-10 RX ORDER — AMOXICILLIN 250 MG
2 CAPSULE ORAL 2 TIMES DAILY PRN
Status: DISCONTINUED | OUTPATIENT
Start: 2018-06-10 | End: 2018-06-12

## 2018-06-10 RX ORDER — BISACODYL 10 MG
10 SUPPOSITORY, RECTAL RECTAL DAILY PRN
Status: DISCONTINUED | OUTPATIENT
Start: 2018-06-10 | End: 2018-06-12

## 2018-06-10 RX ORDER — QUETIAPINE FUMARATE 50 MG/1
200 TABLET, FILM COATED ORAL AT BEDTIME
Status: DISCONTINUED | OUTPATIENT
Start: 2018-06-10 | End: 2018-06-16 | Stop reason: HOSPADM

## 2018-06-10 RX ORDER — ONDANSETRON 2 MG/ML
4 INJECTION INTRAMUSCULAR; INTRAVENOUS EVERY 30 MIN PRN
Status: DISCONTINUED | OUTPATIENT
Start: 2018-06-10 | End: 2018-06-10

## 2018-06-10 RX ORDER — EPHEDRINE SULFATE 50 MG/ML
INJECTION, SOLUTION INTRAMUSCULAR; INTRAVENOUS; SUBCUTANEOUS PRN
Status: DISCONTINUED | OUTPATIENT
Start: 2018-06-10 | End: 2018-06-10

## 2018-06-10 RX ORDER — POTASSIUM CHLORIDE 7.45 MG/ML
10 INJECTION INTRAVENOUS
Status: DISCONTINUED | OUTPATIENT
Start: 2018-06-10 | End: 2018-06-16 | Stop reason: HOSPADM

## 2018-06-10 RX ORDER — POTASSIUM CHLORIDE 1500 MG/1
20-40 TABLET, EXTENDED RELEASE ORAL
Status: DISCONTINUED | OUTPATIENT
Start: 2018-06-10 | End: 2018-06-16 | Stop reason: HOSPADM

## 2018-06-10 RX ORDER — EMTRICITABINE AND TENOFOVIR DISOPROXIL FUMARATE 200; 300 MG/1; MG/1
1 TABLET, FILM COATED ORAL DAILY
Status: DISCONTINUED | OUTPATIENT
Start: 2018-06-10 | End: 2018-06-16 | Stop reason: HOSPADM

## 2018-06-10 RX ORDER — PROCHLORPERAZINE MALEATE 5 MG
10 TABLET ORAL EVERY 6 HOURS PRN
Status: DISCONTINUED | OUTPATIENT
Start: 2018-06-10 | End: 2018-06-12

## 2018-06-10 RX ORDER — ALBUTEROL SULFATE 90 UG/1
1-2 AEROSOL, METERED RESPIRATORY (INHALATION) EVERY 4 HOURS PRN
Status: DISCONTINUED | OUTPATIENT
Start: 2018-06-10 | End: 2018-06-16 | Stop reason: HOSPADM

## 2018-06-10 RX ORDER — ONDANSETRON 4 MG/1
4 TABLET, ORALLY DISINTEGRATING ORAL EVERY 6 HOURS PRN
Status: DISCONTINUED | OUTPATIENT
Start: 2018-06-10 | End: 2018-06-10

## 2018-06-10 RX ORDER — PROCHLORPERAZINE MALEATE 5 MG
10 TABLET ORAL EVERY 6 HOURS PRN
Status: DISCONTINUED | OUTPATIENT
Start: 2018-06-10 | End: 2018-06-16 | Stop reason: HOSPADM

## 2018-06-10 RX ORDER — POTASSIUM CHLORIDE 1.5 G/1.58G
20-40 POWDER, FOR SOLUTION ORAL
Status: DISCONTINUED | OUTPATIENT
Start: 2018-06-10 | End: 2018-06-16 | Stop reason: HOSPADM

## 2018-06-10 RX ORDER — LIDOCAINE 40 MG/G
CREAM TOPICAL
Status: DISCONTINUED | OUTPATIENT
Start: 2018-06-10 | End: 2018-06-16 | Stop reason: HOSPADM

## 2018-06-10 RX ORDER — NEOSTIGMINE METHYLSULFATE 1 MG/ML
VIAL (ML) INJECTION PRN
Status: DISCONTINUED | OUTPATIENT
Start: 2018-06-10 | End: 2018-06-10

## 2018-06-10 RX ORDER — FENTANYL CITRATE 50 UG/ML
INJECTION, SOLUTION INTRAMUSCULAR; INTRAVENOUS PRN
Status: DISCONTINUED | OUTPATIENT
Start: 2018-06-10 | End: 2018-06-10

## 2018-06-10 RX ORDER — LABETALOL HYDROCHLORIDE 5 MG/ML
10-40 INJECTION, SOLUTION INTRAVENOUS EVERY 10 MIN PRN
Status: DISCONTINUED | OUTPATIENT
Start: 2018-06-10 | End: 2018-06-16 | Stop reason: HOSPADM

## 2018-06-10 RX ORDER — ONDANSETRON 2 MG/ML
INJECTION INTRAMUSCULAR; INTRAVENOUS PRN
Status: DISCONTINUED | OUTPATIENT
Start: 2018-06-10 | End: 2018-06-10

## 2018-06-10 RX ORDER — SODIUM CHLORIDE, SODIUM LACTATE, POTASSIUM CHLORIDE, CALCIUM CHLORIDE 600; 310; 30; 20 MG/100ML; MG/100ML; MG/100ML; MG/100ML
INJECTION, SOLUTION INTRAVENOUS CONTINUOUS
Status: DISCONTINUED | OUTPATIENT
Start: 2018-06-10 | End: 2018-06-10 | Stop reason: HOSPADM

## 2018-06-10 RX ORDER — OXYCODONE HYDROCHLORIDE 5 MG/1
5-10 TABLET ORAL
Status: DISCONTINUED | OUTPATIENT
Start: 2018-06-10 | End: 2018-06-12

## 2018-06-10 RX ORDER — NALOXONE HYDROCHLORIDE 0.4 MG/ML
.1-.4 INJECTION, SOLUTION INTRAMUSCULAR; INTRAVENOUS; SUBCUTANEOUS
Status: DISCONTINUED | OUTPATIENT
Start: 2018-06-10 | End: 2018-06-16 | Stop reason: HOSPADM

## 2018-06-10 RX ORDER — SODIUM CHLORIDE 9 MG/ML
INJECTION, SOLUTION INTRAVENOUS CONTINUOUS
Status: DISCONTINUED | OUTPATIENT
Start: 2018-06-10 | End: 2018-06-13

## 2018-06-10 RX ORDER — POTASSIUM CHLORIDE 29.8 MG/ML
20 INJECTION INTRAVENOUS
Status: DISCONTINUED | OUTPATIENT
Start: 2018-06-10 | End: 2018-06-16 | Stop reason: HOSPADM

## 2018-06-10 RX ORDER — LIDOCAINE HYDROCHLORIDE 20 MG/ML
INJECTION, SOLUTION INFILTRATION; PERINEURAL PRN
Status: DISCONTINUED | OUTPATIENT
Start: 2018-06-10 | End: 2018-06-10

## 2018-06-10 RX ORDER — SERTRALINE HYDROCHLORIDE 100 MG/1
100 TABLET, FILM COATED ORAL DAILY
Status: DISCONTINUED | OUTPATIENT
Start: 2018-06-10 | End: 2018-06-16 | Stop reason: HOSPADM

## 2018-06-10 RX ORDER — ONDANSETRON 4 MG/1
4 TABLET, ORALLY DISINTEGRATING ORAL EVERY 6 HOURS PRN
Status: DISCONTINUED | OUTPATIENT
Start: 2018-06-10 | End: 2018-06-16 | Stop reason: HOSPADM

## 2018-06-10 RX ORDER — ONDANSETRON 2 MG/ML
4 INJECTION INTRAMUSCULAR; INTRAVENOUS EVERY 6 HOURS PRN
Status: DISCONTINUED | OUTPATIENT
Start: 2018-06-10 | End: 2018-06-16 | Stop reason: HOSPADM

## 2018-06-10 RX ORDER — KETOCONAZOLE 20 MG/ML
SHAMPOO TOPICAL DAILY
COMMUNITY

## 2018-06-10 RX ORDER — CEFAZOLIN SODIUM 1 G/3ML
1 INJECTION, POWDER, FOR SOLUTION INTRAMUSCULAR; INTRAVENOUS SEE ADMIN INSTRUCTIONS
Status: DISCONTINUED | OUTPATIENT
Start: 2018-06-10 | End: 2018-06-10 | Stop reason: HOSPADM

## 2018-06-10 RX ORDER — NALOXONE HYDROCHLORIDE 0.4 MG/ML
.1-.4 INJECTION, SOLUTION INTRAMUSCULAR; INTRAVENOUS; SUBCUTANEOUS
Status: DISCONTINUED | OUTPATIENT
Start: 2018-06-10 | End: 2018-06-10

## 2018-06-10 RX ORDER — HYDROCODONE BITARTRATE AND ACETAMINOPHEN 5; 325 MG/1; MG/1
1-2 TABLET ORAL EVERY 4 HOURS PRN
Status: DISCONTINUED | OUTPATIENT
Start: 2018-06-10 | End: 2018-06-10

## 2018-06-10 RX ORDER — METOCLOPRAMIDE HYDROCHLORIDE 5 MG/ML
10 INJECTION INTRAMUSCULAR; INTRAVENOUS EVERY 6 HOURS PRN
Status: DISCONTINUED | OUTPATIENT
Start: 2018-06-10 | End: 2018-06-16 | Stop reason: HOSPADM

## 2018-06-10 RX ADMIN — HYDROMORPHONE HYDROCHLORIDE 0.5 MG: 1 INJECTION, SOLUTION INTRAMUSCULAR; INTRAVENOUS; SUBCUTANEOUS at 21:05

## 2018-06-10 RX ADMIN — ROCURONIUM BROMIDE 10 MG: 10 INJECTION INTRAVENOUS at 19:30

## 2018-06-10 RX ADMIN — FENTANYL CITRATE 50 MCG: 50 INJECTION, SOLUTION INTRAMUSCULAR; INTRAVENOUS at 19:25

## 2018-06-10 RX ADMIN — Medication 5 MG: at 19:19

## 2018-06-10 RX ADMIN — Medication 10 MG: at 19:03

## 2018-06-10 RX ADMIN — CEFAZOLIN 1 G: 1 INJECTION, POWDER, FOR SOLUTION INTRAMUSCULAR; INTRAVENOUS at 19:51

## 2018-06-10 RX ADMIN — FENTANYL CITRATE 50 MCG: 50 INJECTION, SOLUTION INTRAMUSCULAR; INTRAVENOUS at 18:55

## 2018-06-10 RX ADMIN — PHENYLEPHRINE HYDROCHLORIDE 100 MCG: 10 INJECTION, SOLUTION INTRAMUSCULAR; INTRAVENOUS; SUBCUTANEOUS at 19:00

## 2018-06-10 RX ADMIN — CLONAZEPAM 2 MG: 1 TABLET ORAL at 13:14

## 2018-06-10 RX ADMIN — SERTRALINE HYDROCHLORIDE 100 MG: 100 TABLET ORAL at 13:14

## 2018-06-10 RX ADMIN — MIDAZOLAM 2 MG: 1 INJECTION INTRAMUSCULAR; INTRAVENOUS at 16:56

## 2018-06-10 RX ADMIN — SODIUM CHLORIDE, POTASSIUM CHLORIDE, SODIUM LACTATE AND CALCIUM CHLORIDE: 600; 310; 30; 20 INJECTION, SOLUTION INTRAVENOUS at 17:51

## 2018-06-10 RX ADMIN — FENTANYL CITRATE 50 MCG: 50 INJECTION, SOLUTION INTRAMUSCULAR; INTRAVENOUS at 17:01

## 2018-06-10 RX ADMIN — GENTAMICIN SULFATE 1000 ML: 40 INJECTION, SOLUTION INTRAMUSCULAR; INTRAVENOUS at 19:46

## 2018-06-10 RX ADMIN — CEFAZOLIN SODIUM 2 G: 2 INJECTION, SOLUTION INTRAVENOUS at 17:32

## 2018-06-10 RX ADMIN — NEOSTIGMINE METHYLSULFATE 5 MG: 1 INJECTION, SOLUTION INTRAVENOUS at 20:09

## 2018-06-10 RX ADMIN — PHENYLEPHRINE HYDROCHLORIDE 100 MCG: 10 INJECTION, SOLUTION INTRAMUSCULAR; INTRAVENOUS; SUBCUTANEOUS at 20:00

## 2018-06-10 RX ADMIN — GENTAMICIN SULFATE 1000 ML: 40 INJECTION, SOLUTION INTRAMUSCULAR; INTRAVENOUS at 17:44

## 2018-06-10 RX ADMIN — ALBUTEROL SULFATE 2.5 MG: 2.5 SOLUTION RESPIRATORY (INHALATION) at 20:58

## 2018-06-10 RX ADMIN — HYDROMORPHONE HYDROCHLORIDE: 10 INJECTION, SOLUTION INTRAMUSCULAR; INTRAVENOUS; SUBCUTANEOUS at 20:45

## 2018-06-10 RX ADMIN — PHENYLEPHRINE HYDROCHLORIDE 100 MCG: 10 INJECTION, SOLUTION INTRAMUSCULAR; INTRAVENOUS; SUBCUTANEOUS at 18:46

## 2018-06-10 RX ADMIN — GLYCOPYRROLATE 1 MG: 0.2 INJECTION, SOLUTION INTRAMUSCULAR; INTRAVENOUS at 20:09

## 2018-06-10 RX ADMIN — LIDOCAINE HYDROCHLORIDE 60 MG: 20 INJECTION, SOLUTION INFILTRATION; PERINEURAL at 17:01

## 2018-06-10 RX ADMIN — Medication 5 MG: at 17:57

## 2018-06-10 RX ADMIN — PHENYLEPHRINE HYDROCHLORIDE 100 MCG: 10 INJECTION, SOLUTION INTRAMUSCULAR; INTRAVENOUS; SUBCUTANEOUS at 18:34

## 2018-06-10 RX ADMIN — HYDROMORPHONE HYDROCHLORIDE 0.5 MG: 1 INJECTION, SOLUTION INTRAMUSCULAR; INTRAVENOUS; SUBCUTANEOUS at 20:41

## 2018-06-10 RX ADMIN — PHENYLEPHRINE HYDROCHLORIDE 100 MCG: 10 INJECTION, SOLUTION INTRAMUSCULAR; INTRAVENOUS; SUBCUTANEOUS at 17:12

## 2018-06-10 RX ADMIN — POTASSIUM CHLORIDE AND SODIUM CHLORIDE: 900; 150 INJECTION, SOLUTION INTRAVENOUS at 22:44

## 2018-06-10 RX ADMIN — Medication 5 MG: at 19:10

## 2018-06-10 RX ADMIN — SODIUM CHLORIDE, POTASSIUM CHLORIDE, SODIUM LACTATE AND CALCIUM CHLORIDE: 600; 310; 30; 20 INJECTION, SOLUTION INTRAVENOUS at 15:17

## 2018-06-10 RX ADMIN — HYDROMORPHONE HYDROCHLORIDE 0.5 MG: 1 INJECTION, SOLUTION INTRAMUSCULAR; INTRAVENOUS; SUBCUTANEOUS at 10:55

## 2018-06-10 RX ADMIN — ROCURONIUM BROMIDE 10 MG: 10 INJECTION INTRAVENOUS at 18:46

## 2018-06-10 RX ADMIN — SODIUM CHLORIDE: 9 INJECTION, SOLUTION INTRAVENOUS at 13:14

## 2018-06-10 RX ADMIN — ONDANSETRON 4 MG: 2 INJECTION INTRAMUSCULAR; INTRAVENOUS at 10:56

## 2018-06-10 RX ADMIN — ROCURONIUM BROMIDE 50 MG: 10 INJECTION INTRAVENOUS at 17:01

## 2018-06-10 RX ADMIN — BUPIVACAINE HYDROCHLORIDE AND EPINEPHRINE BITARTRATE 10 ML: 5; .005 INJECTION, SOLUTION SUBCUTANEOUS at 20:02

## 2018-06-10 RX ADMIN — Medication 5 MG: at 17:37

## 2018-06-10 RX ADMIN — Medication 5 MG: at 18:03

## 2018-06-10 RX ADMIN — THROMBIN, TOPICAL (BOVINE) 10000 UNITS: KIT at 17:44

## 2018-06-10 RX ADMIN — Medication 5 MG: at 18:09

## 2018-06-10 RX ADMIN — PHENYLEPHRINE HYDROCHLORIDE 100 MCG: 10 INJECTION, SOLUTION INTRAMUSCULAR; INTRAVENOUS; SUBCUTANEOUS at 19:19

## 2018-06-10 RX ADMIN — ONDANSETRON 4 MG: 2 INJECTION INTRAMUSCULAR; INTRAVENOUS at 19:53

## 2018-06-10 RX ADMIN — DEXAMETHASONE SODIUM PHOSPHATE 4 MG: 4 INJECTION, SOLUTION INTRA-ARTICULAR; INTRALESIONAL; INTRAMUSCULAR; INTRAVENOUS; SOFT TISSUE at 17:49

## 2018-06-10 RX ADMIN — PROPOFOL 200 MG: 10 INJECTION, EMULSION INTRAVENOUS at 17:01

## 2018-06-10 RX ADMIN — HYDROCODONE BITARTRATE AND ACETAMINOPHEN 1 TABLET: 5; 325 TABLET ORAL at 13:50

## 2018-06-10 ASSESSMENT — COPD QUESTIONNAIRES: COPD: 0

## 2018-06-10 ASSESSMENT — ENCOUNTER SYMPTOMS
WEAKNESS: 1
BACK PAIN: 1
NUMBNESS: 1

## 2018-06-10 ASSESSMENT — ACTIVITIES OF DAILY LIVING (ADL): ADLS_ACUITY_SCORE: 14

## 2018-06-10 ASSESSMENT — LIFESTYLE VARIABLES: TOBACCO_USE: 1

## 2018-06-10 NOTE — ED PROVIDER NOTES
"  History     Chief Complaint:  Numbness      HPI   Annika Garrido is a 52 year old female who presents with back pain and numbness in her thighs.  Patient was recently discharged from [facility] with a diagnosis of sciatica, and states that shortly after this discharge, she started to notice that both of her thighs are becoming numb, and she is having trouble emptying her bladder.  Patient states she has no fevers, no new medications, and has never had problems emptying her bladder before.  Denies any urinary incontinence, but does endorse some saddle anesthesia.  Patient still able to get up and walk around, no functional limitations as yet, but she does endorse some lower back pain.  Her pain is constant, mild to moderate in severity, nonradiating, achy and pressure-like in nature.    Allergies:  No Known Allergies     Medications:      ondansetron (ZOFRAN ODT) 4 MG ODT tab       Problem List:    There are no active problems to display for this patient.       Past Medical History:    Past Medical History:   Diagnosis Date     Asymptomatic human immunodeficiency virus (HIV) infection status (H)        Past Surgical History:    History reviewed. No pertinent surgical history.    Family History:    No family history on file.    Social History:  Marital Status:   [2]  Social History   Substance Use Topics     Smoking status: Current Every Day Smoker     Packs/day: 0.25     Smokeless tobacco: Never Used     Alcohol use No        Review of Systems   Genitourinary: Positive for decreased urine volume and urgency.   Musculoskeletal: Positive for back pain.   Neurological: Positive for weakness and numbness.   All other systems reviewed and are negative.      Physical Exam   First Vitals:  BP: (!) 180/116  Pulse: 58  Temp: 97  F (36.1  C)  Resp: 14  Height: 165.1 cm (5' 5\")  Weight: 82.6 kg (182 lb)  SpO2: 100 %      Physical Exam  Vitals: reviewed by me  General: Pt seen on hospital korey goldsmith, " cooperative, and alert to conversation, non ill appearing.   Eyes: Tracking well, clear conjunctiva BL  ENT: MMM, midline trachea.   Lungs:   No tachypnea, no accessory muscle use. No respiratory distress.   CV: Rate as above, regular rhythm.    Abd: Soft, non tender, no guarding, no rebound. Non distended  MSK: no peripheral edema or joint effusion.  No evidence of trauma  Skin: No rash, normal turgor and temperature  Neuro: Clear speech and no facial droop.  BUE with SILT and 5/5 motor  BLE with SILT (though subjectively diminished between groin and knee) but with 5/5 motor  Can ambulate with strong and independent gait.   PVR- 650cc  Psych: Not RIS, no e/o AH/VH      Emergency Department Course   ECG:  Normal sinus rhythm, no ST changes, normal intervals, no ectopy    Imaging:  MRI LS spine-    IMPRESSION:   1. Large central disc extrusion at the L5-S1 level with disc material  filling the spinal canal and likely compressing the descending nerve  roots below the S2 level as well as likely the traversing left S1  nerve root. There is also mild left neural foraminal narrowing at this  level.  2. Additional degenerative changes at L3-L4 and L4-L5 as described  above.  3. Small bilateral facet effusions with associated surrounding edema  at the L3-L4 level.     Results discussed with Lefty Triana at 10:06 AM on 6/10/2018.      Impression & Plan      Medical Decision Making:  Very nice 52-year-old female who appears to have a significant spinal cord abnormality at L5-S1, consistent with possible cauda equina syndrome.  This is found on MRI, and MRI was done given elevated postvoid residual, and subjective sensation of numbness in her perineal region.  She is up and about in the emergency room, and does have a robust motor exam to her bilateral lower extremities on multiple reassessments.  Her pain is been controlled in the emergency room, but with that MRI finding and patient's symptoms, I did consult Dr. Quinn of  neurosurgery emergently.  I will also admit to the care of Jasper Persaud MD, of the hospitalist team who very kindly agrees to accept care of the patient.  Dr. Quinn en route to evaluate the patient, will keep the patient n.p.o., and reassess her neuro exam every 30 minutes, though she does appear to to have a non-progressive deficits at this time.  Unclear cause of his massive disc herniation, with no recent trauma.       Critical Care time:  40 min    Diagnosis:    ICD-10-CM    1. Cauda equina compression (H) G83.4 Drug abuse screen 77 urine ([place], , )   2. Urinary retention R33.9    3. Acute bilateral low back pain without sciatica M54.5        Disposition:  Admitted     Discharge Medications:  New Prescriptions    No medications on file         Robin Triana  6/10/2018    EMERGENCY DEPARTMENT       Robin Triana MD  06/10/18 [id number]

## 2018-06-10 NOTE — IP AVS SNAPSHOT
"[facility]: [phone]                                              INTERAGENCY TRANSFER FORM - LAB / IMAGING / EKG / EMG RESULTS   6/10/2018                    Hospital Admission Date: 6/10/2018  ANOOP TREVIZO   : 1965  Sex: Female        Attending Provider: Jasper Persaud MD     Allergies:  No Known Allergies    Infection:  None   Service:  HOSPITALIST    Ht:  1.651 m (5' 5\")   Wt:  82.6 kg (182 lb)   Admission Wt:  82.6 kg (182 lb)    BMI:  30.29 kg/m 2   BSA:  1.95 m 2            Patient PCP Information     Provider PCP Type    [facility]         Lab Results - 3 Days      Blood culture [[id number]]  Resulted: 18 0708, Result status: Preliminary result    Ordering provider: Janki Holland MD  18 0856 Resulting lab: [facility]NOSTIC LABORATORY    Specimen Information    Type Source Collected On   Blood  18 0920   Comment:  Left Arm          Components       Value Reference Range Flag Lab   Specimen Description Blood Left Arm      Special Requests Aerobic and anaerobic bottles received   FrStHsLb   Culture Micro No growth after 2 days   225            Blood culture [[id number]]  Resulted: 18 0708, Result status: Preliminary result    Ordering provider: Janki Holland MD  18 0856 Resulting lab: INFECTIOUS DISEASE DIAGNOSTIC LABORATORY    Specimen Information    Type Source Collected On   Blood  18 0930   Comment:  Right Arm          Components       Value Reference Range Flag Lab   Specimen Description Blood Right Arm      Special Requests Received in aerobic bottle only   75   Culture Micro No growth after 2 days   225            Blood culture [[id number]]  Resulted: 18 0706, Result status: Preliminary result    Ordering provider: Jasper Persaud MD  18 0800 Resulting lab: INFECTIOUS DISEASE DIAGNOSTIC LABORATORY    Specimen Information    Type Source Collected On   Blood  " 06/13/18 0915   Comment:  Right Hand          Components       Value Reference Range Flag Lab   Specimen Description Blood Right Hand      Special Requests Aerobic and anaerobic bottles received   FrStHsLb   Culture Micro No growth after 3 days   225            Basic metabolic panel [[id number]]  Resulted: 06/15/18 0852, Result status: Final result    Ordering provider: Victor Hugo Riley MD  06/15/18 0001 Resulting lab: [facility]    Specimen Information    Type Source Collected On   Blood  06/15/18 0805          Components       Value Reference Range Flag Lab   Sodium 138 133 - 144 mmol/L  FrStHsLb   Potassium 4.2 3.4 - 5.3 mmol/L  FrStHsLb   Chloride 105 94 - 109 mmol/L  FrStHsLb   Carbon Dioxide 26 20 - 32 mmol/L  FrStHsLb   Anion Gap 7 3 - 14 mmol/L  FrStHsLb   Glucose 97 70 - 99 mg/dL  FrStHsLb   Urea Nitrogen 12 7 - 30 mg/dL  FrStHsLb   Creatinine 0.64 0.52 - 1.04 mg/dL  FrStHsLb   GFR Estimate >90 >60 mL/min/1.7m2  FrStHsLb   Comment:  Non  GFR Calc   GFR Estimate If Black >90 >60 mL/min/1.7m2  FrStHsLb   Comment:  African American GFR Calc   Calcium 8.7 8.5 - 10.1 mg/dL  FrStHsLb            Treponema pallidum antibody confirm [[id number]] (Abnormal)  Resulted: 06/15/18 0821, Result status: Final result    Ordering provider: Janki Holland MD  06/11/18 1730 Resulting lab: [facility]    Specimen Information    Type Source Collected On     06/11/18 1730          Components       Value Reference Range Flag Lab   T Pallidum by TP-PA conf Reactive Non Reactive A FrStHsLb   Comment:         (Note)  Performed by [facility],  [address] [place] [address],[place] [address] [phone]  [email or web address], Marcio Hansen MD, Lab. Director              Urine Culture Aerobic Bacterial [[id number]]  Resulted: 06/14/18 2132, Result status: Final result    Ordering provider: Jasper Persaud MD  06/13/18 0948 Resulting lab: [facility]    Specimen  Information    Type Source Collected On   Catheterized Urine  06/13/18 0948          Components       Value Reference Range Flag Lab   Specimen Description Catheterized Urine      Special Requests Specimen received in preservative   75   Culture Micro No growth   225            Basic metabolic panel [[id number]] (Abnormal)  Resulted: 06/14/18 0913, Result status: Final result    Ordering provider: Jasper Persaud MD  06/14/18 0001 Resulting lab: [facility]    Specimen Information    Type Source Collected On   Blood  06/14/18 0847          Components       Value Reference Range Flag Lab   Sodium 137 133 - 144 mmol/L  FrStHsLb   Potassium 3.9 3.4 - 5.3 mmol/L  FrStHsLb   Chloride 106 94 - 109 mmol/L  FrStHsLb   Carbon Dioxide 25 20 - 32 mmol/L  FrStHsLb   Anion Gap 6 3 - 14 mmol/L  FrStHsLb   Glucose 101 70 - 99 mg/dL H FrStHsLb   Urea Nitrogen 12 7 - 30 mg/dL  FrStHsLb   Creatinine 0.66 0.52 - 1.04 mg/dL  FrStHsLb   GFR Estimate >90 >60 mL/min/1.7m2  FrStHsLb   Comment:  Non  GFR Calc   GFR Estimate If Black >90 >60 mL/min/1.7m2  FrStHsLb   Comment:  African American GFR Calc   Calcium 8.7 8.5 - 10.1 mg/dL  FrStHsLb            CBC with platelets [[id number]] (Abnormal)  Resulted: 06/14/18 0859, Result status: Final result    Ordering provider: Jasper Persaud MD  06/14/18 0001 Resulting lab: [facility]    Specimen Information    Type Source Collected On   Blood  06/14/18 0847          Components       Value Reference Range Flag Lab   WBC 9.0 4.0 - 11.0 10e9/L  FrStHsLb   RBC Count 3.84 3.8 - 5.2 10e12/L  FrStHsLb   Hemoglobin 10.8 11.7 - 15.7 g/dL L FrStHsLb   Hematocrit 31.9 35.0 - 47.0 % L FrStHsLb   MCV 83 78 - 100 fl  FrStHsLb   MCH 28.1 26.5 - 33.0 pg  FrStHsLb   MCHC 33.9 31.5 - 36.5 g/dL  FrStHsLb   RDW 13.1 10.0 - 15.0 %  FrStHsLb   Platelet Count 198 150 - 450 10e9/L  FrStHsLb            Glucose by meter [[id number]] (Abnormal)  Resulted: 06/14/18  0854, Result status: Final result    Ordering provider: Jasper Persaud MD  06/14/18 0832 Resulting lab: POINT OF CARE TEST, GLUCOSE    Specimen Information    Type Source Collected On     06/14/18 0832          Components       Value Reference Range Flag Lab   Glucose 131 70 - 99 mg/dL H 170            Potassium [[id number]]  Resulted: 06/13/18 2331, Result status: Final result    Ordering provider: Jasper Persaud MD  06/13/18 1815 Resulting lab: [facility]    Specimen Information    Type Source Collected On   Blood  06/13/18 2300          Components       Value Reference Range Flag Lab   Potassium 4.1 3.4 - 5.3 mmol/L  FrStHsLb            Quantiferon TB Gold Plus [[id number]]  Resulted: 06/13/18 1453, Result status: Final result    Ordering provider: Janki Holland MD  06/11/18 1730 Resulting lab: [facility]    Specimen Information    Type Source Collected On   Plasma  06/11/18 1730          Components       Value Reference Range Flag Lab   Quantiferon-TB Gold Plus Result Negative NEG^Negative  51   Comment:         No interferon gamma response to M.tuberculosis antigens was detected.   Infection with M.tuberculosis is unlikely, however a single negative result   does not exclude infection. In patients at high risk for infection, a second   test should be considered  in accordance with the 2017 ATS/IDSA/CDC Clinical Practice Guidelines for   Diagnosis of Tuberculosis in Adults and Children [Lewinsohn TIFFANIE et   al.Clin.Infect.Dis. 2017 64(2):111-115].     TB1 Ag minus Nil Value Negative IU/mL  51   TB2 Ag minus Nil Value 0.00 IU/mL  51   Mitogen minus Nil Result 1.65 IU/mL  51   Nil Result 0.04 IU/mL  51            UA with Microscopic reflex to Culture [[id number]] (Abnormal)  Resulted: 06/13/18 1029, Result status: Final result    Ordering provider: Jasper Persaud MD  06/13/18 0800 Resulting lab: [facility]    Specimen Information     Type Source Collected On   Catheterized Urine  06/13/18 0948          Components       Value Reference Range Flag Lab   Color Urine Yellow   FrStHsLb   Appearance Urine Clear   FrStHsLb   Glucose Urine Negative NEG^Negative mg/dL  FrStHsLb   Bilirubin Urine Negative NEG^Negative  FrStHsLb   Ketones Urine Negative NEG^Negative mg/dL  FrStHsLb   Specific [place] Urine 1.026 1.003 - 1.035  FrStHsLb   Blood Urine Negative NEG^Negative  FrStHsLb   pH Urine 6.0 5.0 - 7.0 pH  FrStHsLb   Protein Albumin Urine 10 NEG^Negative mg/dL A FrStHsLb   Urobilinogen mg/dL >12.0 0.0 - 2.0 mg/dL H FrStHsLb   Nitrite Urine Negative NEG^Negative  FrStHsLb   Leukocyte Esterase Urine Moderate NEG^Negative A FrStHsLb   Source Catheterized Urine   FrStHsLb   WBC Urine 5 0 - 5 /HPF  FrStHsLb   RBC Urine 4 0 - 2 /HPF H FrStHsLb   Squamous Epithelial /HPF Urine <1 0 - 1 /HPF  FrStHsLb   Mucous Urine Present NEG^Negative /LPF A FrStHsLb            Basic metabolic panel [[id number]] (Abnormal)  Resulted: 06/13/18 1006, Result status: Final result    Ordering provider: Jasper Persaud MD  06/13/18 0800 Resulting lab: [facility]    Specimen Information    Type Source Collected On   Blood  06/13/18 0915          Components       Value Reference Range Flag Lab   Sodium 140 133 - 144 mmol/L  FrStHsLb   Potassium 3.3 3.4 - 5.3 mmol/L L FrStHsLb   Chloride 106 94 - 109 mmol/L  FrStHsLb   Carbon Dioxide 27 20 - 32 mmol/L  FrStHsLb   Anion Gap 7 3 - 14 mmol/L  FrStHsLb   Glucose 100 70 - 99 mg/dL H FrStHsLb   Urea Nitrogen 14 7 - 30 mg/dL  FrStHsLb   Creatinine 0.67 0.52 - 1.04 mg/dL  FrStHsLb   GFR Estimate >90 >60 mL/min/1.7m2  FrStHsLb   Comment:  Non  GFR Calc   GFR Estimate If Black >90 >60 mL/min/1.7m2  FrStHsLb   Comment:  African American GFR Calc   Calcium 8.4 8.5 - 10.1 mg/dL L FrStHsLb            CBC with platelets differential [[id number]] (Abnormal)  Resulted: 06/13/18 0932, Result status: Final result     Ordering provider: Jasper Persaud MD  06/13/18 0800 Resulting lab: [facility]    Specimen Information    Type Source Collected On   Blood  06/13/18 0915          Components       Value Reference Range Flag Lab   WBC 8.7 4.0 - 11.0 10e9/L  FrStHsLb   RBC Count 3.66 3.8 - 5.2 10e12/L L FrStHsLb   Hemoglobin 10.4 11.7 - 15.7 g/dL L FrStHsLb   Hematocrit 30.5 35.0 - 47.0 % L FrStHsLb   MCV 83 78 - 100 fl  FrStHsLb   MCH 28.4 26.5 - 33.0 pg  FrStHsLb   MCHC 34.1 31.5 - 36.5 g/dL  FrStHsLb   RDW 13.0 10.0 - 15.0 %  FrStHsLb   Platelet Count 155 150 - 450 10e9/L  FrStHsLb   Diff Method Automated Method   FrStHsLb   % Neutrophils 67.0 %  FrStHsLb   % Lymphocytes 17.7 %  FrStHsLb   % Monocytes 14.6 %  FrStHsLb   % Eosinophils 0.2 %  FrStHsLb   % Basophils 0.2 %  FrStHsLb   % Immature Granulocytes 0.3 %  FrStHsLb   Nucleated RBCs 0 0 /100  FrStHsLb   Absolute Neutrophil 5.8 1.6 - 8.3 10e9/L  FrStHsLb   Absolute Lymphocytes 1.5 0.8 - 5.3 10e9/L  FrStHsLb   Absolute Monocytes 1.3 0.0 - 1.3 10e9/L  FrStHsLb   Absolute Eosinophils 0.0 0.0 - 0.7 10e9/L  FrStHsLb   Absolute Basophils 0.0 0.0 - 0.2 10e9/L  FrStHsLb   Abs Immature Granulocytes 0.0 0 - 0.4 10e9/L  FrStHsLb   Absolute Nucleated RBC 0.0   FrStHsLb            HIV-1 RNA quantitative [[id number]]  Resulted: 06/13/18 0707, Result status: Final result    Ordering provider: Janki Holland MD  06/11/18 9932 Resulting lab: [facility]    Specimen Information    Type Source Collected On   Blood  06/11/18 1730          Components       Value Reference Range Flag Lab   HIV-1 RNA Quant Result HIV-1 RNA Not Detected HIVND^HIV-1 RNA Not Detected {Copies}/mL  75   Comment:         The ASHLEY AmpliPrep/ASHLEY TaqMan HIV-1 test is an FDA-approved in vitro   nucleic acid amplification test for the quantitation of HIV-1 RNA in human   plasma (EDTA plasma) using the ASHLEY AmpliPrep instrument for automated viral   nucleic acid extraction and  the ASHLEY TaqMan Analyzer or ASHLEY TaqMan for   automated Real Time PCR amplification and detection of the viral nucleic acid   target.  Titer results are reported in copies/ml. This assay is intended for use in   conjunction with clinical presentation and other laboratory markers of disease   prognosis and for use as an aid in assessing viral response to antiretroviral   treatment as measured by changes in plasma HIV-1 RNA levels. This test should   not be used as a donor screening test to confirm the presence of HIV-1   infection.     HIV RNA QT Log Not Calculated <1.3 {Log_copies}/mL  75            Magnesium [[id number]]  Resulted: 06/13/18 0654, Result status: Final result    Ordering provider: Jasper Persaud MD  06/13/18 0605 Resulting lab: [facility]    Specimen Information    Type Source Collected On     06/13/18 0605          Components       Value Reference Range Flag Lab   Magnesium 2.0 1.6 - 2.3 mg/dL  FrStHsLb            Drug abuse screen 77 urine (FL, RH, SH) [[id number]] (Abnormal)  Resulted: 06/13/18 0625, Result status: Final result    Ordering provider: Jasper Persaud MD  06/12/18 0904 Resulting lab: [facility]    Specimen Information    Type Source Collected On   Urine Urine catheter 06/13/18 0600          Components       Value Reference Range Flag Lab   Amphetamine Qual Urine Negative NEG^Negative  FrStHsLb   Comment:  Cutoff for a negative amphetamine is 500 ng/mL or less.   Barbiturates Qual Urine Negative NEG^Negative  FrStHsLb   Comment:  Cutoff for a negative barbiturate is 200 ng/mL or less.   Benzodiazepine Qual Urine Negative NEG^Negative  FrStHsLb   Comment:  Cutoff for a negative benzodiazepine is 200 ng/mL or less.   Cannabinoids Qual Urine Negative NEG^Negative  FrStHsLb   Comment:  Cutoff for a negative cannabinoid is 50 ng/mL or less.   Cocaine Qual Urine Positive NEG^Negative A FrStHsLb   Comment:         Cutoff for a positive cocaine is  "greater than 300 ng/mL. This is an   unconfirmed screening result to be used for medical purposes only.     Opiates Qualitative Urine Negative NEG^Negative  FrStHsLb   Comment:  Cutoff for a negative opiate is 300 ng/mL or less.   PCP Qual Urine Negative NEG^Negative  FrStHsLb   Comment:  Cutoff for a negative PCP is 25 ng/mL or less.            Lactic acid level STAT for sepsis protocol [[id number]]  Resulted: 06/13/18 0620, Result status: Final result    Ordering provider: Jasper Persaud MD  06/13/18 0548 Resulting lab: [facility]    Specimen Information    Type Source Collected On   Blood  06/13/18 0605          Components       Value Reference Range Flag Lab   Lactate for Sepsis Protocol 0.5 0.4 - 1.9 mmol/L  FrStHsLb            Testing Performed By     Lab - Abbreviation Name Director Address Valid Date Range    14 - FrStHsLb [facility] Unknown [address] [place] [address] 05/08/15 1057 - Present    51 - Unknown [facility] [place] Unknown [address] [facility] [address] 12/31/14 1010 - Present    75 - Unknown [facility] Unknown [address] [facility] [address] 01/15/15 1019 - Present    170 - Unknown POINT OF CARE TEST, GLUCOSE Unknown Unknown 10/31/11 1114 - Present    225 - Unknown INF[facility]EASE [facility] LAB[facility]AT[facility] Unknown [address] [facility] [address] 12/19/14 0954 - Present            Unresulted Labs (24h ago through future)    Start       Ordered    Unscheduled  Potassium  (Potassium Replacement - \"Standard\" - For K levels less than 3.4 mmol/L - UU,UR,UA,RH,SH,PH,WY )  CONDITIONAL (SPECIFY),   Routine     Comments:  Obtain Potassium Level for these conditions:  *IF no potassium result within 24 hours before initiation of order set, draw potassium level with next lab collect.    *2 HOURS AFTER last IV potassium replacement dose and 4 hours after an oral replacement dose.  *Next " "morning after potassium dose.     Repeat Potassium Replacement if necessary.    06/10/18 1200    Unscheduled  Magnesium  (Magnesium Replacement -  Adult - \"Standard\" - Replacement for all levels less than 1.6 mg/dL )  CONDITIONAL (SPECIFY),   Routine     Comments:  Obtain Magnesium Level for these conditions:  *IF no magnesium result within 24 hrs before initiation of order set, draw magnesium level with next lab collect.    *2 HOURS AFTER last magnesium replacement dose when magnesium replacement given for level less than 1.6   *Next morning after magnesium dose.     Repeat Magnesium Replacement if necessary.    06/10/18 1200         Imaging Results - 3 Days      XR Chest Port 1 View [[id number]]  Resulted: 06/13/18 1419, Result status: Final result    Ordering provider: Jasper Persaud MD  06/13/18 0800 Resulted by: Lyly Wen MD    Performed: 06/13/18 1025 - 06/13/18 1035 Resulting lab: RADIOLOGY RESULTS    Narrative:       CHEST ONE VIEW UPRIGHT 6/13/2018 10:35 AM     HISTORY: Fever.     COMPARISON: None.      Impression:       IMPRESSION: Linear opacity posterior to the heart, likely atelectasis  and/or fibrosis.    LYLY WEN MD      Testing Performed By     Lab - Abbreviation Name Director Address Valid Date Range    104 - Rad Rslts RADIOLOGY RESULTS Unknown Unknown 02/16/05 1553 - Present            Encounter-Level Documents:     There are no encounter-level documents.      Order-Level Documents:     There are no order-level documents.      "

## 2018-06-10 NOTE — IP AVS SNAPSHOT
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   2. Has been on multiple medications in past followed by [facility] and most recently at the [facility] [place] care center at [facility].   3. Current HIV regimen is Tvicay and truvada, most recent CD4 count is 140.   4. Qunat TB negative in 2017, RPR negative in a recent office visit.   5. Per patient she has missed a total of 4-5 pills last month, per  there has been more than that.   6. Presented now with: L5-S1 disc herniation, stenosis, and cauda equina syndrome, S/P:   1.  L5-S1 insertion of bilateral pedicle screws and rods  2.  L5-S1 bilateral laminectomies, right medial facetectomy, left complete facetectomy, and bilateral evacuation of disc herniation for decompression of stenosis  3.  Left L5-S1 complete facetectomy, transforaminal discectomy, and interbody arthrodesis  4.  L5-S1 insertion of [place] Tritanium intervertebral graft with Vitoss allograft     Recommendations:[DS1.1]   Fevers over night into early morning, had urinary retention related issues, UA ia negative, BC and UC pending, no new complaints, watch for now.[DS1.2]   Presentation perhaps unrelated to HIV,  Viral Load is[DS1.1] undetectable[DS1.3], compliance has been an issue per . Most recent CD4 is 140.   Back on Tivicay and Truvada.   Quant TB pending, RPR[DS1.1] negative[DS1.3], CMV[DS1.1] negative[DS1.2].            MD Janki Charles MD    Interval History   Labs  pending   No new complaints   Drowsy     Physical Exam   Temp: 99.1  F (37.3  C) Temp src: Oral BP: (!) 86/55 Pulse: 101 Heart Rate: 104 Resp: 18 SpO2: 99 % O2 Device: None (Room air)    Vitals:    06/10/18 0740   Weight: 82.6 kg (182 lb)     Vital Signs with Ranges  Temp:  [98.6  F (37  C)-102.3  F (39.1  C)] 99.1  F (37.3  C)  Pulse:  [] 101  Heart Rate:  [] 104  Resp:  [16-20] 18  BP: ()/(55-87) 86/55  SpO2:  [94 %-99 %] 99 %    Constitutional: drowsy, wakes up briefly only to go back to sleep   Lungs: Clear to auscultation bilaterally, no crackles or wheezing  Cardiovascular: Regular rate and rhythm, normal S1 and S2, and no murmur noted  Abdomen: Normal bowel sounds, soft, non-distended, non-tender  Skin: No rashes, no cyanosis, no edema  Other:    Medications     0.9% sodium chloride + KCl 20 mEq/L 50 mL/hr at 06/11/18 1742     sodium chloride 100 mL/hr at 06/10/18 1314       acetaminophen  975 mg Oral Q8H     albuterol  2.5 mg Nebulization 4x daily     clonazePAM (klonoPIN) tablet 1 mg  1 mg Oral QAM     clonazePAM (klonoPIN) tablet 1 mg  1 mg Oral At Bedtime     dolutegravir  50 mg Oral Daily     emtricitabine-tenofovir  1 tablet Oral Daily     QUEtiapine (SEROquel) tablet 200 mg  200 mg Oral At Bedtime     senna-docusate  1 tablet Oral BID    Or     senna-docusate  2 tablet Oral BID     sertraline (ZOLOFT) tablet 100 mg  100 mg Oral Daily     sodium chloride (PF)  3 mL Intracatheter Q8H       Data   All microbiology laboratory data reviewed.  Recent Labs   Lab Test  06/12/18   0759  06/11/18   0757  06/10/18   1050   WBC  6.7  7.7  4.0   HGB  10.5*  11.2*  13.8   HCT  31.1*  33.1*  39.8   MCV  83  83  82   PLT  158  169  192     Recent Labs   Lab Test  06/12/18   0759  06/11/18   0757  06/10/18   1050   CR  0.66  0.63  0.76     No lab results found.  No lab results found.    Invalid input(s): UC[DS1.1]     Revision History        User Key Date/Time User Provider Type Action    > DS1.2  6/13/2018  2:10 PM Janki Holland MD Physician Sign     DS1.3 6/13/2018  1:47 PM Janki Holland MD Physician      DS1.1 6/13/2018  9:09 AM Janki Holland MD Physician             Progress Notes by Hien Tran PA-C at 6/13/2018  9:39 AM     Author:  Hien Tran PA-C Service:  Neurosurgery Author Type:  Physician Assistant    Filed:  6/13/2018 11:26 AM Date of Service:  6/13/2018  9:39 AM Creation Time:  6/13/2018  9:39 AM    Status:  Addendum :  Hien Tran PA-C (Physician Assistant)         [facility]    Neurosurgery Progress Note    Date of Service (when I saw the patient): 06/13/2018     Assessment & Plan   Annika Garrido is a 52 year old female who was admitted on 6/10/2018  with severe back and leg pain, urinary retention, saddle anesthesias, and loss of rectal tone.  MRI showed very large L5-S1 disc herniation with extensive migration, critical stenosis, and loss of disc height. Subsequently underwent L5-S1 TLIF with Dr. Jeison Quinn. Today, she is resting in bed and appears comfortable. States she is feeling well. She did have fevers overnight and CXR and cultures have been ordered for evaluation. She had vidal replaced yesterday, appreciate management per urology. Per RN has had loose stools, recommend holding Senna until normalized. Therapies recommending ARU at OR.     Active Problems:    Cauda equina compression (H)    S/P lumbar fusion    Assessment: s/p L5-S1 fusion    Plan:   -Fever workup per hospitalist  -Recommend restarting fluids  -Vidal replaced yesterday; appreciate management per urology  -Continue to monitor bowel regimen; hold Senna until normalized  -PT/OT and ambulation  -Staple removal 6/24/2018      I have discussed the following assessment and plan with Dr. Quinn who is in agreement with initial plan and will follow up with further consultation recommendations.      Hien Tran PA-C  [facility]  [address]  "[address] [address]    Tel [phone]  Pager [phone]      Interval History   Fever overnight. Loose BMs.    Physical Exam   Temp: 99.1  F (37.3  C) Temp src: Oral BP: (!) 86/55 Pulse: 101 Heart Rate: 104 Resp: 18 SpO2: 99 % O2 Device: None (Room air)    Vitals:    06/10/18 0740   Weight: 182 lb (82.6 kg)     Vital Signs with Ranges  Temp:  [98.6  F (37  C)-102.3  F (39.1  C)] 99.1  F (37.3  C)  Pulse:  [] 101  Heart Rate:  [] 104  Resp:  [16-20] 18  BP: ()/(55-87) 86/55  SpO2:  [94 %-99 %] 99 %  I/O last 3 completed shifts:  In: 100 [P.O.:100]  Out: 1575 [Urine:1575]    Heart Rate: 104, Blood pressure (!) 86/55, pulse 101, temperature 99.1  F (37.3  C), temperature source Oral, resp. rate 18, height 5' 5\" (1.651 m), weight 182 lb (82.6 kg), SpO2 99 %.  182 lbs 0 oz  HEENT:  Normocephalic, atraumatic.  PERRLA.  EOM s intact.   Heart:  No peripheral edema  Lungs:  No SOB  Skin:  Warm and dry. Incision covered with dressing CDI.[JW1.1] No signs of discharge, erythema, or edema.[JW1.2]   Extremities:  No edema, cyanosis or clubbing.    NEUROLOGICAL EXAMINATION:   Mental status:  Alert and Oriented x 3, speech is fluent.  Cranial nerves:  II-XII intact.   Motor:     Hip Flexor:                Right: 5/5  Left:  5/5  Hip Adductor:             Right:  5/5  Left:  5/5  Hip Abductor:             Right:  5/5  Left:  5/5  Gastroc Soleus:        Right:  5/5  Left:  5/5  Tib/Ant:                      Right:  5/5  Left:  5/5  EHL:                     Right:  5/5  Left:  5/5  Sensation:  intact     Medications     0.9% sodium chloride + KCl 20 mEq/L 50 mL/hr at 06/11/18 1742     sodium chloride 100 mL/hr at 06/10/18 1314       acetaminophen  975 mg Oral Q8H     albuterol  2.5 mg Nebulization 4x daily     clonazePAM (klonoPIN) tablet 1 mg  1 mg Oral QAM     clonazePAM (klonoPIN) tablet 1 mg  1 mg Oral At Bedtime     dolutegravir  50 mg Oral Daily     emtricitabine-tenofovir  1 tablet " Oral Daily     QUEtiapine (SEROquel) tablet 200 mg  200 mg Oral At Bedtime     senna-docusate  1 tablet Oral BID    Or     senna-docusate  2 tablet Oral BID     sertraline (ZOLOFT) tablet 100 mg  100 mg Oral Daily     sodium chloride (PF)  3 mL Intracatheter Q8H       Data   CBC RESULTS:   Recent Labs   Lab Test  06/13/18   0915   WBC  8.7   RBC  3.66*   HGB  10.4*   HCT  30.5*   MCV  83   MCH  28.4   MCHC  34.1   RDW  13.0   PLT  155     Basic Metabolic Panel:  Lab Results   Component Value Date     06/12/2018      Lab Results   Component Value Date    POTASSIUM 3.6 06/12/2018     Lab Results   Component Value Date    CHLORIDE 109 06/12/2018     Lab Results   Component Value Date    MANOLO 8.1 06/12/2018     Lab Results   Component Value Date    CO2 28 06/12/2018     Lab Results   Component Value Date    BUN 12 06/12/2018     Lab Results   Component Value Date    CR 0.66 06/12/2018     Lab Results   Component Value Date    GLC 91 06/12/2018     INR:  No results found for: INR[JW1.1]         Revision History        User Key Date/Time User Provider Type Action    > JW1.2 6/13/2018 11:26 AM Hien Tran PA-C Physician Assistant Addend     JW1.1 6/13/2018  9:54 AM Hien Tran PA-C Physician Assistant Sign            Progress Notes by Jasper Persaud MD at 6/13/2018 10:10 AM     Author:  Jasper Persaud MD Service:  Hospitalist Author Type:  Physician    Filed:  6/13/2018 10:20 AM Date of Service:  6/13/2018 10:10 AM Creation Time:  6/13/2018 10:10 AM    Status:  Signed :  Jasper Persaud MD (Physician)         [facility]    Hospitalist Progress Note    Date of Service (when I saw the patient): 06/13/2018    Assessment & Plan   Annika Garrido is a 52 year old female who was admitted on 6/10/2018 with cauda equina syndrome.    1.  Cauda equina syndrome due to L5-S1 disc herniation, s/p decompression on 6/8.  Post op cares per Neurosurgery.  Pain control with  acetaminophen and oxycodone..    2.  HIV positive status.  Patient reports that her HIV is well controlled, but  told ID that she is noncompliant with meds.  She is followed by the positive care clinic through [facility].  Will continue her current HIV medications, Tivicay and Truvada.  ID has ordered viral load (undetectable), Quant TB pending, RPR negative, CMV PCR negative.     3.  Bipolar disorder.  Continue Seroquel and Zoloft.  Psychiatry reduced dose of Kloopin.     4.  Asthma.  Continue albuterol inhaler as needed.  Postoperatively patient will be started on albuterol 4 times daily to prevent exacerbation.     5.  Nicotine abuse.  Will write for nicotine lozenge as needed.  Smoking cessation was encouraged.    6.  Toxic metabolic encephalopathy.  Less sleepy today.  Urine tox screen unchanged from admission (pos for cocaine)  Will reduce oxycodone to 2.5-5 mg orally every 4 hours and dilaudid to 0.2 mg IV q 2 hrs prn.    7.  Positive depression screen.  Consult Psychiatry to evaluate.      8.  Fevers.  UA/UC, blood cultures x 2, CXR ordered.  Will ask NS to evaluate surgical site.  Will ask ID to comment and make any further recommendations.    # Pain Assessment:  Current Pain Score 6/13/2018   Patient currently in pain? -   Pain score (0-10) 7   Pain location -   Pain descriptors -   - Annika is experiencing pain due to surgery. Pain management was discussed and the plan was created in a collaborative fashion.  Annika's response to the current recommendations: compliant  - Please see the plan for pain management as documented above          DVT Prophylaxis: Pneumatic Compression Devices  Code Status: Full Code    Disposition: Expected discharge pending post op course.    Jasper Persaud  Text Page (7 am to 6 pm)    Interval History   The patient is resting in bed.  She is awake and answers questions appropriately.  No acute complaints.  Pain is controlled.    -Data reviewed  today: I reviewed all new labs and imaging results over the last 24 hours. I personally reviewed no images or EKG's today.    Physical Exam   Temp: 99.2  F (37.3  C) Temp src: Oral BP: (!) 86/55 Pulse: 101 Heart Rate: 104 Resp: 18 SpO2: 99 % O2 Device: None (Room air)    Vitals:    06/10/18 0740   Weight: 82.6 kg (182 lb)     Vital Signs with Ranges  Temp:  [98.6  F (37  C)-102.3  F (39.1  C)] 99.2  F (37.3  C)  Pulse:  [] 101  Heart Rate:  [] 104  Resp:  [16-20] 18  BP: ()/(55-87) 86/55  SpO2:  [94 %-99 %] 99 %  I/O last 3 completed shifts:  In: 100 [P.O.:100]  Out: 1575 [Urine:1575]    Gen: Well nourished, well developed, alert and oriented, no acute distressed  HEENT: Atraumatic, normocephalic  Lungs: Clear to ausculation without wheezes, rhonchi, or rales  Heart: Regular rate and rhythm, no murmurs, gallops, or rubs  GI: Bowel sound normal, no hepatosplenomegaly or masses  Lymph: No lymphadenopathy or edema  Skin: No rashes     Medications     0.9% sodium chloride + KCl 20 mEq/L 50 mL/hr at 06/13/18 0953       acetaminophen  975 mg Oral Q8H     albuterol  2.5 mg Nebulization 4x daily     clonazePAM (klonoPIN) tablet 1 mg  1 mg Oral QAM     clonazePAM (klonoPIN) tablet 1 mg  1 mg Oral At Bedtime     dolutegravir  50 mg Oral Daily     emtricitabine-tenofovir  1 tablet Oral Daily     QUEtiapine (SEROquel) tablet 200 mg  200 mg Oral At Bedtime     senna-docusate  1 tablet Oral BID    Or     senna-docusate  2 tablet Oral BID     sertraline (ZOLOFT) tablet 100 mg  100 mg Oral Daily     sodium chloride (PF)  3 mL Intracatheter Q8H       Data     Recent Labs  Lab 06/13/18  0915 06/12/18  0759 06/11/18  0757 06/10/18  1050   WBC 8.7 6.7 7.7 4.0   HGB 10.4* 10.5* 11.2* 13.8   MCV 83 83 83 82    158 169 192    143 141 143   POTASSIUM 3.3* 3.6 3.8 3.5   CHLORIDE 106 109 110* 112*   CO2 27 28 26 26   BUN 14 12 11 12   CR 0.67 0.66 0.63 0.76   ANIONGAP 7 6 5 5   MANOLO 8.4* 8.1* 8.5 9.0   *  91 99 90   ALBUMIN  --   --   --  3.7   PROTTOTAL  --   --   --  8.3   BILITOTAL  --   --   --  0.3   ALKPHOS  --   --   --  100   ALT  --   --   --  21   AST  --   --   --  18       No results found for this or any previous visit (from the past 24 hour(s)).[MC1.1]       Revision History        User Key Date/Time User Provider Type Action    > MC1.1 6/13/2018 10:20 AM Jasper Persaud MD Physician Sign            Progress Notes by Jose Enrique Tse, RN at 6/13/2018  9:50 AM     Author:  Jose Enrique Tse, RN Service:  Care Coordinator Author Type:      Filed:  6/13/2018 10:09 AM Date of Service:  6/13/2018  9:50 AM Creation Time:  6/13/2018  9:50 AM    Status:  Addendum :  Jose Enrique Tse, RN ()         Care Coordinator spoke with[VW1.1] Jonnathan[VW1.2] ([VW1.1]pt's [facility] Care Coordinator[VW1.2],[VW1.1][phone], fax [phone])[VW1.2] who[VW1.1] called[VW1.2]  haim[VW1.1]debo[VW1.2]ing for update.[VW1.1]  Per Jonnathan,[VW1.2] pt has bipolar disorder and is being followed by a psychiatrist.  Jonnathan states that pt lives in a public housing with grandchildren and  receives 2 1/2 hours of PCA[VW1.1] everyday[VW1.3];  her PCA is Denzel (who was listed in the contact list as pt's spouse).  Jonnathan also states that[VW1.1] pt[VW1.2] at times will not go to her follow-up appointments.  Jonnathan was informeand that therapies are recommending pt to be discharged to a rehab facility. Jonnathan requests for[VW1.1] [place] papers to be faxed to her at [place].[VW1.2] Kylah JASOS updated.[VW1.1]       Revision History        User Key Date/Time User Provider Type Action    > [N/A] 6/13/2018 10:09 AM Jose Enrique Tse RN Case Manager Addend     VW1.3 6/13/2018 10:08 AM Jose Enrique Tse RN Case Manager Sign     VW1.1 6/13/2018  9:56 AM Jose Enrique Tse RN Case Manager      VW1.2 6/13/2018  9:50 AM Jose Enrique Tse RN Case Manager             Progress Notes by Kylah Aguilar at 6/13/2018  9:51 AM     Author:  Kylah Aguilar  Service:  Social Work Author Type:      Filed:  6/13/2018  9:56 AM Date of Service:  6/13/2018  9:51 AM Creation Time:  6/13/2018  9:51 AM    Status:  Addendum :  Kylah Aguilar ()         ROGELIO    I: SW spoke with [facility] liaison regarding patient placement.  Physical therapy is now recommending TCU[CS1.1] unless activity tolerance improves, then ARU.  [facility] and SW will continue to follow[CS1.2].[CS1.1]  SW may need to obtain TCU choices from patient if applicable.[CS1.2]    P:  Continue to assist as needed.    NABILA Roberson[CS1.1]     Revision History        User Key Date/Time User Provider Type Action    > CS1.2 6/13/2018  9:56 AM Kylah Aguilar  Addend     CS1.1 6/13/2018  9:52 AM Kylah Aguilar  Sign                  Procedure Notes     No notes of this type exist for this encounter.         Progress Notes - Therapies (Notes from 06/13/18 through 06/16/18)      Progress Notes by Ab Bowles RT at 6/15/2018  4:30 AM     Author:  Ab Bowles RT Service:  Respiratory Therapy Author Type:  Respiratory Therapist    Filed:  6/15/2018  4:31 AM Date of Service:  6/15/2018  4:30 AM Creation Time:  6/15/2018  4:30 AM    Status:  Signed :  Ab Bowles RT (Respiratory Therapist)         Patient is on room air and SpO2 low to high 90`s. Scheduled neb tx given and tolerated well. BBS clear/diminished. Aeration improved post neb tx. Will continue to follow.[SK1.1]     Revision History        User Key Date/Time User Provider Type Action    > SK1.1 6/15/2018  4:31 AM Ab Bowles RT Respiratory Therapist Sign            Progress Notes by Karl Mendoza RT at 6/14/2018 11:39 AM     Author:  Karl Mendoza RT Service:  (none) Author Type:  Respiratory Therapist    Filed:  6/14/2018 11:40 AM Date of Service:  6/14/2018 11:39 AM Creation Time:  6/14/2018 11:39 AM    Status:  Signed :  Karl Mendoza RT (Respiratory Therapist)          Albuterol neb given X 2 in this shift. Pt tolerated well. Will continue monitor.[FM1.1]      Revision History        User Key Date/Time User Provider Type Action    > FM1.1 6/14/2018 11:40 AM Karl Mendoza RT Respiratory Therapist Sign            Progress Notes by Francisca Ott OT at 6/14/2018 10:49 AM     Author:  Francisca Ott OT Service:  (none) Author Type:  Occupational Therapist    Filed:  6/14/2018 10:50 AM Date of Service:  6/14/2018 10:49 AM Creation Time:  6/14/2018 10:49 AM    Status:  Signed :  Francisca Ott OT (Occupational Therapist)            06/14/18 0826   Quick Adds   Type of Visit Initial Occupational Therapy Evaluation   Living Environment   Lives With spouse;grandchild(angel)   Living Arrangements house   Home Accessibility stairs to enter home   Number of Stairs to Enter Home 2   Transportation Available taxi   Living Environment Comment  is PCA for 2 hours a day.    Functional Level Prior   Ambulation 1-->assistive equipment   Transferring 3-->assistive equipment and person   Toileting 1-->assistive equipment   Bathing 2-->assistive person   Dressing 2-->assistive person   Eating 0-->independent   Communication 0-->understands/communicates without difficulty   Prior Functional Level Comment  helps with dressing/bathing, meal preparation and manages meds. Pt needs A sitting up at home.    General Information   Onset of Illness/Injury or Date of Surgery - Date 06/10/18   Referring Physician Cori   Additional Occupational Profile Info/Pertinent History of Current Problem Admitted with cauda equina syndrome and lumbaer stenosis (L5-SI disc herniation) underwent L5-S1 TLIF   Precautions/Limitations spinal precautions;fall precautions   Cognitive Status Examination   Orientation place;person;orientation to person, place and time  (Knew the day of week but not date (off by one))   Level of Consciousness lethargic/somnolent   Able to Follow Commands  "moderate impairment   Cognitive Comment Difficulty carrying over taught techniques; keeps eyes closed and needing VC to open them. Could recall 1/3 precautions (bending; said another one was sitting up straight)   Sensory Examination   Sensory Comments Did not screen but pt reported \"I cant feel my lower back\" when repositioning in bed   Pain Assessment   Patient Currently in Pain Yes, see Vital Sign flowsheet  (No number stated )   Posture   Posture Comments Difficulty standing up straight   Mobility   Bed Mobility Bed mobility skill: Supine to sit   Bed Mobility Skill: Supine to Sit   Level of [place]: Supine/Sit minimum assist (75% patients effort)   Transfer Skill: Sit to Stand   Level of [place]: Sit/Stand moderate assist (50% patients effort)   Assistive Device for Transfer: Sit/Stand rolling walker   Transfer Skill: Toilet Transfer   Level of [place]: Toilet minimum assist (75% patients effort)   Assistive Device rolling walker   Toilet Transfer Skill Comments Commode used over toilet to ensure precautions   Balance   Balance Comments Poor balance while ambulating/transfering   Lower Body Dressing   Level of [place]: Dress Lower Body dependent (less than 25% patients effort)  (Difficulty bending legs up)   Toileting   Level of [place]: Toilet minimum assist (75% patients effort)  (Needing help to pull down/up pull up)   Activities of Daily Living Analysis   Impairments Contributing to Impaired Activities of Daily Living balance impaired;flexibility decreased;pain;post surgical precautions  (Activity tolerance and general weakness )   General Therapy Interventions   Planned Therapy Interventions ADL retraining;transfer training   Intervention Comments Activity tolerance with ADLs and look further into cog as chart review shows a need/ not able to follow commands during eval   Clinical Impression   Criteria for Skilled Therapeutic Interventions Met yes, treatment indicated   OT " Diagnosis Decreased ADLs   Influenced by the following impairments Activity tolerance, balance, pain, flexibility, general weakness, command following   Assessment of Occupational Performance 5 or more Performance Deficits   Identified Performance Deficits Dressing, bathing, toileting, transfers,    Clinical Decision Making (Complexity) High complexity   Therapy Frequency daily   Predicted Duration of Therapy Intervention (days/wks) 3   Anticipated Discharge Disposition Acute Rehabilitation Facility;Transitional Care Facility   Risks and Benefits of Treatment have been explained. Yes   Patient, Family & other staff in agreement with plan of care Yes   Clinical Impression Comments Unsure if pt will be able to tolerate ARF   Total Evaluation Time   Total Evaluation Time (Minutes) 10[JM1.1]        Revision History        User Key Date/Time User Provider Type Action    > JM1.1 6/14/2018 10:50 AM Francisca Ott, OT Occupational Therapist Sign

## 2018-06-10 NOTE — CONSULTS
[facility]    Neurosurgery Consultation     Date of Admission:  6/10/2018  Date of Consult (When I saw the patient): 06/10/18    Assessment & Plan   Annika Garrido is a 52 year old female who was admitted on 6/10/2018. I was asked to see the patient for management of lumbar stenosis and cauda equina syndrome.    Active Problems:    Cauda equina compression (H)    Assessment: Very large L5-S1 disc herniation with critical lumbar stenosis, Urinary retention, saddle anesthesias, and decreased rectal tone consistent with cauda equina syndrome    Plan:   Cauda equina syndrome and disc herniation with critical stenosis  Will plan for surgery  Discussed that due to the size of the disc herniation and degree of stenosis, decompression and discectomy will require complete facetectomy and surgical stabilization  Risks and benefits discussed and she wishes to proceed    Code Status    Full Code    Reason for Consult   Reason for consult: I was asked by Dr. Persaud to evaluate this patient for management of cauda equina syndrome.    Primary Care Physician   [facility]    Chief Complaint   Back pain and saddle anesthesias    History of Present Illness   Annika Garrido is a 52 year old female who presents with large L5-S1 disc herniation, severe stenosis, and cauda equina syndrome.  A week ago, developed severe aching low back pain.  Worsened 2 days ago, 10/10 low back pain radiating bilaterally, and bilateral posterior thigh radiation to the knees.  [place] [facility] [place] and was discharged.  This morning, developed saddle anesthesias and urinary retention.    Past Medical History   I have reviewed this patient's medical history and updated it with pertinent information if needed.   Past Medical History:   Diagnosis Date     Asymptomatic human immunodeficiency virus (HIV) infection status (H)        Past Surgical History   I have reviewed this patient's surgical history and updated it with  pertinent information if needed.  History reviewed. No pertinent surgical history.    Prior to Admission Medications   Prior to Admission Medications   Prescriptions Last Dose Informant Patient Reported? Taking?   CLONAZEPAM PO 6/9/2018 at AM Self Yes Yes   Sig: Take 2 mg by mouth every morning   CLONAZEPAM PO 6/9/2018 at PM Self Yes Yes   Sig: Take 1 mg by mouth At Bedtime   HYDROcodone-acetaminophen (NORCO) 5-325 MG per tablet 6/9/2018 at Unknown time Self Yes Yes   Sig: Take 1 tablet by mouth every 8 hours as needed for severe pain   QUETIAPINE FUMARATE PO 6/9/2018 at PM Self Yes Yes   Sig: Take 200 mg by mouth At Bedtime   SERTRALINE HCL PO 6/9/2018 at Unknown time Self Yes Yes   Sig: Take 100 mg by mouth daily   albuterol (PROAIR HFA/PROVENTIL HFA/VENTOLIN HFA) 108 (90 Base) MCG/ACT Inhaler Past Week at Unknown time Self Yes Yes   Sig: Inhale 1-2 puffs into the lungs every 4 hours as needed for shortness of breath / dyspnea or wheezing   ciclopirox 8 % SOLN 6/9/2018 at Unknown time Self Yes Yes   Sig: Apply to adjacent skin and affected nails daily.  Remove with alcohol every 7 days, then repeat.   clotrimazole (LOTRIMIN) 1 % cream Past Week at Unknown time Self Yes Yes   Sig: Apply topically 2 times daily To the affected toe.   dolutegravir (TIVICAY) 50 MG tablet 6/9/2018 at Unknown time Self Yes Yes   Sig: Take 50 mg by mouth daily   emtricitabine-tenofovir (TRUVADA) 200-300 MG per tablet 6/9/2018 at Unknown time Self Yes Yes   Sig: Take 1 tablet by mouth daily   ketoconazole (NIZORAL) 2 % shampoo Past Week at Unknown time Self Yes Yes   Sig: Apply topically daily as needed for itching or irritation      Facility-Administered Medications: None     Allergies   No Known Allergies    Social History   I have reviewed this patient's social history and updated it with pertinent information if needed. Annika Radhika  reports that she has been smoking.  She has been smoking about 0.25 packs per day. She has never  "used smokeless tobacco. She reports that she uses illicit drugs, including Cocaine. She reports that she does not drink alcohol.    Family History   I have reviewed this patient's family history and updated it with pertinent information if needed.   No family history on file.    Review of Systems   CONSTITUTIONAL: NEGATIVE for fever, chills, change in weight  INTEGUMENTARY/SKIN: NEGATIVE for worrisome rashes, moles or lesions  EYES: NEGATIVE for vision changes or irritation  ENT/MOUTH: NEGATIVE for ear, mouth and throat problems  RESP: NEGATIVE for significant cough or SOB  BREAST: NEGATIVE for masses, tenderness or discharge  CV: NEGATIVE for chest pain, palpitations or peripheral edema  GI: NEGATIVE for nausea, abdominal pain, heartburn, or change in bowel habits  : NEGATIVE for frequency, dysuria, or hematuria  MUSCULOSKELETAL: NEGATIVE for significant arthralgias or myalgia  NEURO: NEGATIVE for weakness, dizziness or paresthesias  ENDOCRINE: NEGATIVE for temperature intolerance, skin/hair changes  HEME: NEGATIVE for bleeding problems  PSYCHIATRIC: NEGATIVE for changes in mood or affect    Physical Exam   Temp: 98.2  F (36.8  C) Temp src: Oral BP: (!) 151/102 Pulse: 58 Heart Rate: 58 Resp: 14 SpO2: 97 % O2 Device: None (Room air)    Vital Signs with Ranges  Temp:  [97  F (36.1  C)-98.2  F (36.8  C)] 98.2  F (36.8  C)  Pulse:  [58] 58  Heart Rate:  [58] 58  Resp:  [14] 14  BP: (137-180)/() 151/102  SpO2:  [95 %-100 %] 97 %  182 lbs 0 oz    Heart Rate: 58, Blood pressure (!) 151/102, pulse 58, temperature 98.2  F (36.8  C), temperature source Oral, resp. rate 14, height 1.651 m (5' 5\"), weight 82.6 kg (182 lb), SpO2 97 %.  182 lbs 0 oz  HEENT:  Normocephalic, atraumatic.  PERRLA.  EOM s intact.   Neck:  Supple, non-tender, without lymphadenopathy.  Heart:  No peripheral edema  Lungs:  No SOB  Abdomen:  Non-distended.  Skin:  Warm and dry.  Extremities:  No edema, cyanosis or clubbing.    NEUROLOGICAL " EXAMINATION:     Mental status:  Alert and Oriented x 3, speech is fluent.  Cranial nerves:  II-XII intact.   Motor:  Strength is 5/5 throughout the upper and lower extremities  Shoulder Abduction:  Right:  5/5   Left:  5/5  Biceps:                      Right:  5/5   Left:  5/5  Triceps:                     Right:  5/5   Left:  5/5  Wrist Extensors:       Right:  5/5   Left:  5/5  Wrist Flexors:           Right:  5/5   Left:  5/5  interosseus :            Right:  5/5   Left:  5/5   Hip Flexor:                Right: 5/5  Left:  5/5  Hip Adductor:             Right:  5/5  Left:  5/5  Hip Abductor:             Right:  5/5  Left:  5/5  Gastroc Soleus:        Right:  5/5  Left:  5/5  Tib/Ant:                      Right:  5/5  Left:  5/5  EHL:                     Right:  5/5  Left:  5/5  Sensation:  Absent teodoro-rectal and saddle sensation  Reflexes:   Negative Babinski.  Negative Clonus.    Coordination:  Smooth finger to nose testing.   Negative pronator drift.  Markedly diminished rectal tone, and absent bulbocavernosus reflex  Elevated PVRs and difficulty with urination    Data   All new lab and imaging data was personally reviewed by me.  MRI: Very large L5-S1 extruded disc herniation causing obliteration of thecal sac and critical stenosis  CBC RESULTS:   Recent Labs   Lab Test  06/10/18   1050   WBC  4.0   RBC  4.84   HGB  13.8   HCT  39.8   MCV  82   MCH  28.5   MCHC  34.7   RDW  13.2   PLT  192     Basic Metabolic Panel:  Lab Results   Component Value Date     06/10/2018      Lab Results   Component Value Date    POTASSIUM 3.5 06/10/2018     Lab Results   Component Value Date    CHLORIDE 112 06/10/2018     Lab Results   Component Value Date    MANOLO 9.0 06/10/2018     Lab Results   Component Value Date    CO2 26 06/10/2018     Lab Results   Component Value Date    BUN 12 06/10/2018     Lab Results   Component Value Date    CR 0.76 06/10/2018     Lab Results   Component Value Date    GLC 90 06/10/2018      INR:  No results found for: INR

## 2018-06-10 NOTE — IP AVS SNAPSHOT
` `     [facility] [phone]            Medication Administration Report for Annika Garrido as of 06/16/18 1319   Legend:    Given Hold Not Given Due Canceled Entry Other Actions    Time Time (Time) Time  Time-Action       Inactive    Active    Linked        Medications 06/10/18 06/11/18 06/12/18 06/13/18 06/14/18 06/15/18 06/16/18    acetaminophen (TYLENOL) tablet 650 mg  Dose: 650 mg  Freq: EVERY 4 HOURS PRN Route: PO  PRN Reason: other  PRN Comment: multimodal surgical pain management along with NSAIDS and opioid medication as indicated based on pain control and physical function.  Start: 06/13/18 2000   Admin Instructions: May give first dose 4 hours after last scheduled dose of acetaminophen  Maximum acetaminophen dose from all sources = 75 mg/kg/day not to exceed 4 grams/day.    Admin. Amount: 2 tablet (2 × 325 mg tablet)  Last Admin: 06/16/18 1244  Dispense Loc: [facility] 73  POC: Post-procedure         0437 (650 mg)-Given       0957 (650 mg)-Given       1547 (650 mg)-Given        0850 (650 mg)-Given       1607 (650 mg)-Given        0808 (650 mg)-Given       1244 (650 mg)-Given           albuterol (PROAIR HFA/PROVENTIL HFA/VENTOLIN HFA) Inhaler 1-2 puff  Dose: 1-2 puff  Freq: EVERY 4 HOURS PRN Route: IN  PRN Reasons: shortness of breath / dyspnea,wheezing  Start: 06/10/18 1200   Admin. Amount: 1-2 puff  Dispense Loc:  Main Pharmacy     1502-Auto Hold       2146-Unhold                 albuterol neb solution 2.5 mg  Dose: 2.5 mg  Freq: 4 TIMES DAILY Route: NEBULIZATION  Start: 06/11/18 1100   Admin. Amount: 2.5 mg = 3 mL Conc: 2.5 mg/3 mL  Last Admin: 06/16/18 1125  Dispense Loc: [place] [id number]  Volume: 3 mL      1055 (2.5 mg)-Given       1520 (2.5 mg)-Given       (1958)-Not Given        0700 (2.5 mg)-Given       1210 (2.5 mg)-Given       1509 (2.5 mg)-Given       1924 (2.5 mg)-Given        0816 (2.5 mg)-Given       1152 (2.5 mg)-Given       1556 (2.5 mg)-Given       2001 (2.5  mg)-Given        0813 (2.5 mg)-Given       1137 (2.5 mg)-Given       1550 (2.5 mg)-Given       2010 (2.5 mg)-Given        0803 (2.5 mg)-Given       (1119)-Not Given       1549 (2.5 mg)-Given       (1943)-Not Given        0751 (2.5 mg)-Given       1125 (2.5 mg)-Given       [ ] 1500       [ ] 1900           bisacodyl (DULCOLAX) Suppository 10 mg  Dose: 10 mg  Freq: DAILY PRN Route: RE  PRN Reason: constipation  Start: 06/12/18 1308   Admin Instructions: Hold for loose stools.  This is the third step of a three step constipation treatment.    Admin. Amount: 1 suppository (1 × 10 mg suppository)  Dispense Loc: [facility] 73               clonazePAM (klonoPIN) tablet 1 mg  Dose: 1 mg  Freq: EVERY MORNING Route: PO  Start: 06/13/18 0900   Admin. Amount: 1 tablet (1 × 1 mg tablet)  Last Admin: 06/16/18 0808  Dispense Loc: [facility] 73        0954 (1 mg)-Given        0957 (1 mg)-Given        0851 (1 mg)-Given        0808 (1 mg)-Given           clonazePAM (klonoPIN) tablet 1 mg  Dose: 1 mg  Freq: AT BEDTIME Route: PO  Start: 06/10/18 2200   Admin. Amount: 1 tablet (1 × 1 mg tablet)  Last Admin: 06/15/18 2112  Dispense Loc: [facility] 73     1502-Auto Hold       2146-Unhold       (2329)-Not Given [C]        2342 (1 mg)-Given        2204 (1 mg)-Given        2301 (1 mg)-Given        2244 (1 mg)-Given        2112 (1 mg)-Given        [ ] 2200           dolutegravir (TIVICAY) tablet 50 mg  Dose: 50 mg  Freq: DAILY Route: PO  Indications of Use: HUMAN IMMUNODEFICIENCY VIRUS DISEASE  Start: 06/10/18 1215   Admin. Amount: 1 tablet (1 × 50 mg tablet)  Last Admin: 06/16/18 0808  Dispense Loc: [facility] 73     1500-Hold       1502-Auto Hold       2146-Unhold        0815 (50 mg)-Given        0808 (50 mg)-Given        0954 (50 mg)-Given        0957 (50 mg)-Given        0850 (50 mg)-Given        0808 (50 mg)-Given           emtricitabine-tenofovir (TRUVADA) 200-300 MG per tablet 1 tablet  Dose: 1 tablet  Freq: DAILY Route: PO  Indications of Use: HIV  POSITIVE  Start: 06/10/18 1215   Admin Instructions: If given together with didanosine buffered tablets, take on an empty stomach.    Admin. Amount: 1 tablet  Last Admin: 06/16/18 0808  Dispense Loc: [facility] 73     1500-Hold [C]       1502-Auto Hold       2146-Unhold        0815 (1 tablet)-Given        0807 (1 tablet)-Given        0954 (1 tablet)-Given        0957 (1 tablet)-Given        0851 (1 tablet)-Given        0808 (1 tablet)-Given           hydrALAZINE (APRESOLINE) injection 10 mg  Dose: 10 mg  Freq: EVERY 4 HOURS PRN Route: IV  PRN Reason: high blood pressure  PRN Comment: give for SBP > 180  Start: 06/10/18 1341   Admin Instructions: For ordered doses up to 40 mg, give IV Push undiluted over 1 minute.    Admin. Amount: 10 mg = 0.5 mL Conc: 20 mg/mL  Dispense Loc: [facility] 73  Volume: 0.5 mL     1502-Auto Hold       2146-Unhold                 HYDROmorphone (PF) (DILAUDID) injection 0.2 mg  Dose: 0.2 mg  Freq: EVERY 2 HOURS PRN Route: IV  PRN Reason: other  PRN Comment: for pain control or improvement in physical function.  Hold dose for analgesic side effects.  Start: 06/12/18 0905   Admin Instructions: Start at the lowest dose.  May adjust dose by 0.1 mg every 2 hours as needed.  Notify provider to assess for uncontrolled pain or analgesic side effects. Hold while on PCA or with regular IV opioid dosing  For ordered doses up to 4 mg give IV Push undiluted. Administer each 2mg over 2-5 minutes.    Admin. Amount: 0.2 mg  Dispense Loc: [facility] 73               labetalol (NORMODYNE/TRANDATE) injection 10-40 mg  Dose: 10-40 mg  Freq: EVERY 10 MIN PRN Route: IV  PRN Reason: high blood pressure  Start: 06/10/18 3071   Admin Instructions: IF Heart Rate 60 beats per minute or greater initiate labetalol (NORMODYNE,TRANDATE) for hypertension. For Systolic Blood Pressure greater than 160 mmHg. Hold if Heart Rate less than 60 beats per minute. Give dose over 1-2 minutes. Increase or repeat the dose if Blood Pressure goal  "not met. Give 10 mg, wait 10 minutes.  If not effective then give 20 mg. Wait 10 minutes.  If not effective then give 40 mg. If still not effective then start niCARdipine (CARDENE) IV infusion IF ORDERED. Notify provider within 1 hour if Blood Pressure parameters are not met.  For ordered doses up to 80 mg, give IV Push undiluted. Give each 20 mg over 2 minutes.    Admin. Amount: 10-40 mg = 2-8 mL Conc: 5 mg/mL  Dispense Loc:  [facility]  Infused Over: 2-8 Minutes  Volume: 8 mL  POC: Post-procedure               lidocaine (LMX4) cream  Freq: EVERY 1 HOUR PRN Route: Top  PRN Reason: pain  PRN Comment: with VAD insertion or accessing implanted port.  Start: 06/10/18 2159   Admin Instructions: Do NOT give if patient has a history of allergy to any local anesthetic or any \"zeferino\" product.   Apply 30 minutes prior to VAD insertion or port access.  MAX Dose:  2.5 g (  of 5 g tube)    Dispense Loc: Contact Rx for dose  POC: Post-procedure               lidocaine 1 % 1 mL  Dose: 1 mL  Freq: EVERY 1 HOUR PRN Route: OTHER  PRN Comment: mild pain with VAD insertion or accessing implanted port  Start: 06/10/18 2159   Admin Instructions: Do NOT give if patient has a history of allergy to any local anesthetic or any \"zeferino\" product. MAX dose 1 mL subcutaneous OR intradermal in divided doses.    Admin. Amount: 1 mL  Dispense Loc:  [facility]  Volume: 20 mL  POC: Post-procedure               magnesium sulfate 4 g in 100 mL sterile water (premade)  Dose: 4 g  Freq: EVERY 4 HOURS PRN Route: IV  PRN Reason: magnesium supplementation  Start: 06/10/18 1200   Admin Instructions: For serum Mg++ less than 1.6 mg/dL  Give 4 g and recheck magnesium level 2 hours after dose, and next AM.    Admin. Amount: 4 g = 100 mL Conc: 4 g/100 mL  Dispense Loc: Contact Rx for dose  Infused Over: 120 Minutes  Volume: 100 mL     1502-Auto Hold       2146-Unhold                 metoclopramide (REGLAN) tablet 10 mg  Dose: 10 mg  Freq: EVERY 6 HOURS PRN Route: " PO  PRN Reasons: nausea,vomiting  Start: 06/10/18 2159   Admin Instructions: This is Step 3 of nausea and vomiting management.  Give if nausea not resolved 15 minutes after giving prochlorperazine (COMPAZINE).  If nausea not resolved in 15-30 minutes, Notify provider.  Avoid use if patient has full bowel obstruction or perforation.    Admin. Amount: 1 tablet (1 × 10 mg tablet)  Dispense Loc:  [facility]  POC: Post-procedure              Or  metoclopramide (REGLAN) injection 10 mg  Dose: 10 mg  Freq: EVERY 6 HOURS PRN Route: IV  PRN Reasons: nausea,vomiting  Start: 06/10/18 2159   Admin Instructions: This is Step 3 of nausea and vomiting management.  Give if nausea not resolved 15 minutes after giving prochlorperazine (COMPAZINE).  If nausea not resolved in 15-30 minutes, Notify provider.  Avoid use if patient has full bowel obstruction or perforation. Irritant. For ordered doses up to 10 mg, give IV Push undiluted over 2 minutes.    Admin. Amount: 10 mg = 2 mL Conc: 5 mg/mL  Dispense Loc: [facility]  Infused Over: 2 Minutes  Volume: 2 mL  POC: Post-procedure               naloxone (NARCAN) injection 0.1-0.4 mg  Dose: 0.1-0.4 mg  Freq: EVERY 2 MIN PRN Route: IV  PRN Reason: opioid reversal  Start: 06/10/18 2159   Admin Instructions: For respiratory rate LESS than or EQUAL to 8.  Partial reversal dose:  0.1 mg titrated q 2 minutes for Analgesia Side Effects Monitoring Sedation Level of 3 (frequently drowsy, arousable, drifts to sleep during conversation).Full reversal dose:  0.4 mg bolus for Analgesia Side Effects Monitoring Sedation Level of 4 (somnolent, minimal or no response to stimulation).  For ordered doses up to 2mg give IVP. Give each 0.4mg over 15 seconds in emergency situations. For non-emergent situations further dilute in 9mL of NS to facilitate titration of response.    Admin. Amount: 0.1-0.4 mg = 0.25-1 mL Conc: 0.4 mg/mL  Dispense Loc: [facility]  Volume: 1 mL  POC: Post-procedure               nicotine  polacrilex (COMMIT) lozenge 2 mg  Dose: 2 mg  Freq: EVERY 1 HOUR PRN Route: BU  PRN Reason: smoking cessation  Start: 06/10/18 1200   Admin Instructions: Allow lozenge to dissolve completely.  Do Not Bite, Chew, or Swallow.    Admin. Amount: 1 lozenge (1 × 2 mg lozenge)  Last Admin: 06/15/18 1605  Dispense Loc: [facility] 73     1502-Auto Hold       2146-Unhold           1424 (2 mg)-Given        1605 (2 mg)-Given            ondansetron (ZOFRAN-ODT) ODT tab 4 mg  Dose: 4 mg  Freq: EVERY 6 HOURS PRN Route: PO  PRN Reasons: nausea,vomiting  Start: 06/10/18 1200   Admin Instructions: This is Step 1 of nausea and vomiting management.  If nausea not resolved in 15 minutes, go to Step 2 prochlorperazine (COMPAZINE). Do not push through foil backing. Peel back foil and gently remove. Place on tongue immediately. Administration with liquid unnecessary  With dry hands, peel back foil backing and gently remove tablet; do not push oral disintegrating tablet through foil backing; administer immediately on tongue and oral disintegrating tablet dissolves in seconds; then swallow with saliva; liquid not required.    Admin. Amount: 1 tablet (1 × 4 mg tablet)  Dispense Loc: [facility] 73     1502-Auto Hold       2146-Unhold                Or  ondansetron (ZOFRAN) injection 4 mg  Dose: 4 mg  Freq: EVERY 6 HOURS PRN Route: IV  PRN Reasons: nausea,vomiting  Start: 06/10/18 1200   Admin Instructions: This is Step 1 of nausea and vomiting management.  If nausea not resolved in 15 minutes, go to Step 2 prochlorperazine (COMPAZINE).  Irritant. For ordered doses up to 4 mg, give IV Push undiluted over 2-5 minutes.    Admin. Amount: 4 mg = 2 mL Conc: 4 mg/2 mL  Dispense Loc: [facility] 73  Infused Over: 2-5 Minutes  Volume: 2 mL     1502-Auto Hold       2146-Unhold                 oxyCODONE IR (ROXICODONE) half-tab 2.5-5 mg  Dose: 2.5-5 mg  Freq: EVERY 4 HOURS PRN Route: PO  PRN Reason: other  PRN Comment: pain control or improvement in physical  function. Hold dose for analgesic side effects.  Start: 06/12/18 0915   Admin Instructions: Start with the lowest dose.    May adjust dose by 5 mg every 3 hours as needed.  Notify provider to assess for uncontrolled pain or analgesic side effects. Hold while on PCA or with regular IV opioid dosing. Maximum total is 80 mg in 24 hours.    Admin. Amount: 1-2 half-tab (1-2 × 2.5 mg half-tab)  Last Admin: 06/16/18 1244  Dispense Loc: [facility] 73  POC: Post-procedure        0018 (2.5 mg)-Given       0407 (2.5 mg)-Given       0955 (2.5 mg)-Given       1522 (2.5 mg)-Given        0957 (2.5 mg)-Given       1729 (2.5 mg)-Given        0850 (2.5 mg)-Given       1607 (2.5 mg)-Given        0808 (2.5 mg)-Given       1244 (2.5 mg)-Given           polyethylene glycol (MIRALAX/GLYCOLAX) Packet 17 g  Dose: 17 g  Freq: DAILY PRN Route: PO  PRN Reason: constipation  Start: 06/12/18 1308   Admin Instructions: Give in 8oz of  water, juice, or soda. Hold for loose stools.  This is the second step of a three step constipation treatment.  1 Packet = 17 grams. Mixed prescribed dose in 8 ounces of water. Follow with 8 oz. of water.    Admin. Amount: 17 g  Last Admin: 06/16/18 1244  Dispense Loc:  [facility] 73           1244 (17 g)-Given           potassium chloride (KLOR-CON) Packet 20-40 mEq  Dose: 20-40 mEq  Freq: EVERY 2 HOURS PRN Route: ORAL OR FEED  PRN Reason: potassium supplementation  Start: 06/10/18 1200   Admin Instructions: Use if unable to tolerate tablets.  If Serum K+ 3.0-3.3, dose = 60 mEq po total dose (40 mEq x1 followed in 2 hours by 20 mEq x1). Recheck K+ level 4 hours after dose and the next AM.  If Serum K+ 2.5-2.9, dose = 80 mEq po total dose (40 mEq Q2H x2). Recheck K+ level 4 hours after dose and the next AM.  If Serum K+ less than 2.5, See IV order.  Dissolve packet contents in 4-8 ounces of cold water or juice.    Admin. Amount: 20-40 mEq  Dispense Loc:  [facility] 73     1504-Auto Hold       2144-Unhold                 potassium  chloride 10 mEq in 100 mL intermittent infusion with 10 mg lidocaine  Dose: 10 mEq  Freq: EVERY 1 HOUR PRN Route: IV  PRN Reason: potassium supplementation  Start: 06/10/18 1200   Admin Instructions: Infuse via PERIPHERAL LINE. Use potassium with lidocaine for pain with peripheral administration.  If Serum K+ 3.0-3.3, dose = 10 mEq/hr x4 doses (40 mEq IV total dose). Recheck K+ level 2 hours after dose and the next AM.  If Serum K+ less than 3.0, dose = 10 mEq/hr x6 doses (60 mEq IV total dose). Recheck K+ level 2 hours after dose and the next AM.    Admin. Amount: 10 mEq = 100 mL Conc: 10 mEq/100 mL  Dispense Loc: Contact Rx for dose  Infused Over: 1 Hours  Volume: 100 mL     1502-Auto Hold       2146-Unhold                 potassium chloride 10 mEq in 100 mL sterile water intermittent infusion (premix)  Dose: 10 mEq  Freq: EVERY 1 HOUR PRN Route: IV  PRN Reason: potassium supplementation  Start: 06/10/18 1200   Admin Instructions: Infuse via PERIPHERAL LINE or CENTRAL LINE. Use for central line replacement if patient weight less than 65 kg, if patient is on TPN with high potassium content or if unit does not stock 20 mEq bags.   If Serum K+ 3.0-3.3, dose = 10 mEq/hr x4 doses (40 mEq IV total dose). Recheck K+ level 2 hours after dose and the next AM.   If Serum K+ less than 3.0, dose = 10 mEq/hr x6 doses (60 mEq IV total dose). Recheck K+ level 2 hours after dose and the next AM.    Admin. Amount: 10 mEq = 100 mL Conc: 10 mEq/100 mL  Dispense Loc: Contact Rx for dose  Infused Over: 60 Minutes  Volume: 100 mL     1502-Auto Hold       2146-Unhold                 potassium chloride 20 mEq in 50 mL intermittent infusion  Dose: 20 mEq  Freq: EVERY 1 HOUR PRN Route: IV  PRN Reason: potassium supplementation  Start: 06/10/18 1200   Admin Instructions: Infuse via CENTRAL LINE Only. May need EKG if less than 65 kg or on TPN - Max rate is 0.3 mEq/kg/hr for patients not on EKG monitoring.   If Serum K+ 3.0-3.3, dose = 20  mEq/hr x2 doses (40 mEq IV total dose). Recheck K+ level 2 hours after dose and the next AM.  If Serum K+ less than 3.0, dose = 20 mEq/hr x3 doses (60 mEq IV total dose). Recheck K+ level 2 hours after dose and the next AM.    Admin. Amount: 20 mEq = 50 mL Conc: 20 mEq/50 mL  Dispense Loc: Contact Rx for dose  Infused Over: 2 Hours  Volume: 50 mL     1502-Auto Hold       2146-Unhold                 potassium chloride SA (K-DUR/KLOR-CON M) CR tablet 20-40 mEq  Dose: 20-40 mEq  Freq: EVERY 2 HOURS PRN Route: PO  PRN Reason: potassium supplementation  Start: 06/10/18 1200   Admin Instructions: Use if able to take PO.   If Serum K+ 3.0-3.3, dose = 60 mEq po total dose (40 mEq x1 followed in 2 hours by 20 mEq x1). Recheck K+ level 4 hours after dose and the next AM.  If Serum K+ 2.5-2.9, dose = 80 mEq po total dose (40 mEq Q2H x2). Recheck K+ level 4 hours after dose and the next AM.  If Serum K+ less than 2.5, See IV order.  DO NOT CRUSH    Admin. Amount: 1-2 tablet (1-2 × 20 mEq tablet)  Last Admin: 06/13/18 1814  Dispense Loc:  [facility] 73     1502-Auto Hold       2146-Unhold          1612 (40 mEq)-Given       1814 (20 mEq)-Given              prochlorperazine (COMPAZINE) injection 10 mg  Dose: 10 mg  Freq: EVERY 6 HOURS PRN Route: IV  PRN Reasons: nausea,vomiting  Start: 06/10/18 1200   Admin Instructions: This is Step 2 of nausea and vomiting management. Give if nausea not resolved 15 minutes after giving ondansetron (ZOFRAN).  If nausea not resolved in 15 minutes, go to Step 3 metoclopramide (REGLAN), if ordered.  For ordered doses up to 10 mg, give IV Push undiluted. Each 5mg over 1 minute.    Admin. Amount: 10 mg = 2 mL Conc: 5 mg/mL  Dispense Loc: [facility]  Infused Over: 1-2 Minutes  Volume: 2 mL     1502-Auto Hold       2146-Unhold                Or  prochlorperazine (COMPAZINE) tablet 10 mg  Dose: 10 mg  Freq: EVERY 6 HOURS PRN Route: PO  PRN Reason: vomiting  Start: 06/10/18 1200   Admin Instructions: This is  Step 2 of nausea and vomiting management. Give if nausea not resolved 15 minutes after giving ondansetron (ZOFRAN).  If nausea not resolved in 15 minutes, go to Step 3 metoclopramide (REGLAN), if ordered.    Admin. Amount: 2 tablet (2 × 5 mg tablet)  Dispense Loc: [facility] 73     1502-Auto Hold       2146-Unhold                Or  prochlorperazine (COMPAZINE) Suppository 25 mg  Dose: 25 mg  Freq: EVERY 12 HOURS PRN Route: RE  PRN Reasons: nausea,vomiting  Start: 06/10/18 1200   Admin Instructions: This is Step 2 of nausea and vomiting management. Give if nausea not resolved 15 minutes after giving ondansetron (ZOFRAN).  If nausea not resolved in 15 minutes, go to Step 3 metoclopramide (REGLAN), if ordered.    Admin. Amount: 1 suppository (1 × 25 mg suppository)  Dispense Loc: [facility]     1502-Auto Hold       2146-Unhold                 QUEtiapine (SEROquel) tablet 200 mg  Dose: 200 mg  Freq: AT BEDTIME Route: PO  Start: 06/10/18 2200   Admin. Amount: 4 tablet (4 × 50 mg tablet)  Last Admin: 06/15/18 2111  Dispense Loc: [facility] 73     1502-Auto Hold       2146-Unhold       (2330)-Not Given [C]        (2156)-Not Given [C]        (2204)-Not Given [C]        (2303)-Not Given [C]        (2247)-Not Given        2111 (200 mg)-Given        [ ] 2200           senna-docusate (SENOKOT-S;PERICOLACE) 8.6-50 MG per tablet 1 tablet  Dose: 1 tablet  Freq: 2 TIMES DAILY PRN Route: PO  PRN Reason: constipation  Start: 06/12/18 1308   Admin Instructions: If no bowel movement in 24 hours, increase to 2 tablets PO.  Hold for loose stools.  This is the first step of a three step constipation treatment.    Admin. Amount: 1 tablet  Dispense Loc: [facility] 73              Or  senna-docusate (SENOKOT-S;PERICOLACE) 8.6-50 MG per tablet 2 tablet  Dose: 2 tablet  Freq: 2 TIMES DAILY PRN Route: PO  PRN Reason: constipation  Start: 06/12/18 1308   Admin Instructions: Hold for loose stools.  This is the first step of a three step constipation  treatment.    Admin. Amount: 2 tablet  Dispense Loc: [facility] 73               senna-docusate (SENOKOT-S;PERICOLACE) 8.6-50 MG per tablet 1 tablet  Dose: 1 tablet  Freq: 2 TIMES DAILY Route: PO  Start: 06/12/18 2100   Admin Instructions: If no bowel movement in 24 hours, increase to 2 tablets PO.  Hold for loose stools.    Admin. Amount: 1 tablet  Last Admin: 06/16/18 0808  Dispense Loc:  [facility]       (2204)-Not Given        (1317)-Not Given [C]       (2100)-Not Given        (0958)-Not Given       (2116)-Not Given                       0808 (1 tablet)-Given       [ ] 2100          Or  senna-docusate (SENOKOT-S;PERICOLACE) 8.6-50 MG per tablet 2 tablet  Dose: 2 tablet  Freq: 2 TIMES DAILY Route: PO  Start: 06/12/18 2100   Admin Instructions: Hold for loose stools.    Admin. Amount: 2 tablet  Last Admin: 06/15/18 2111  Dispense Loc: [facility]                                             (0800)-Not Given       2111 (2 tablet)-Given               [ ] 2100           sertraline (ZOLOFT) tablet 100 mg  Dose: 100 mg  Freq: DAILY Route: PO  Start: 06/10/18 1215   Admin. Amount: 1 tablet (1 × 100 mg tablet)  Last Admin: 06/16/18 0808  Dispense Loc: [place] [address]     1314 (100 mg)-Given       1502-Auto Hold       2146-Unhold        0815 (100 mg)-Given        0807 (100 mg)-Given        0954 (100 mg)-Given        1000 (100 mg)-Given        0851 (100 mg)-Given        0808 (100 mg)-Given           sodium chloride (PF) 0.9% PF flush 3 mL  Dose: 3 mL  Freq: EVERY 8 HOURS Route: IK  Start: 06/10/18 2200   Admin Instructions: And Q1H PRN, to lock peripheral IV dormant line.    Admin. Amount: 3 mL  Last Admin: 06/15/18 2112  Dispense Loc: [facility] Floor Stock  Volume: 3 mL  POC: Post-procedure     2236 (3 mL)-Given        0542 (3 mL)-Given       (1410)-Not Given       (2109)-Not Given               1419 (3 mL)-Given       2206 (3 mL)-Given        0550 (3 mL)-Given       (1422)-Not Given       (3473)-Not Given        (0549)-Not Given        ()-Not Given       ()-Not Given        (0659)-Not Given       (1500)-Not Given        (3 mL)-Given        (0515)-Not Given       [ ] 1400       [ ] 2200           sodium chloride (PF) 0.9% PF flush 3 mL  Dose: 3 mL  Freq: EVERY 1 HOUR PRN Route: IK  PRN Reason: line flush  PRN Comment: for peripheral IV flush post IV meds  Start: 06/10/18 2159   Admin. Amount: 3 mL  Dispense Loc: [facility] Floor Stock  Volume: 3 mL  POC: Post-procedure              Discontinued Medications  Medications 06/10/18 06/11/18 06/12/18 06/13/18 06/14/18 06/15/18 06/16/18         Rate: 50 mL/hr   Freq: CONTINUOUS Route: IV  Start: 06/10/18 2200   End: 18   Last Admin: 18  Dispense Loc: [facility] Floor Stock  Volume: 1,000 mL  POC: Post-procedure     2244 ( )-New Bag        0039 ( )-Rate/Dose Verify       0625 ( )-Rate/Dose Verify       1742 ( )-New Bag         0953 ( )-New Bag       1429 ( )-Rate/Dose Verify       1710 ( )-New Bag        1533-Med Discontinued           Dose: 975 mg  Freq: EVERY 8 HOURS Route: PO  Start: 06/10/18 2200   End: 18   Admin Instructions: Do not use if patient has an active opioid/acetaminophen combined analgesic product ordered for pain.  Maximum acetaminophen dose from all sources = 75 mg/kg/day not to exceed 4 grams/day.    Admin. Amount: 3 tablet (3 × 325 mg tablet)  Last Admin: 18  Dispense Loc:  [facility] 73  Administrations Remainin  POC: Post-procedure     ()-Not Given [C]        0539 (975 mg)-Given       1410 (975 mg)-Given       2342 (975 mg)-Given        0540 (975 mg)-Given       1418 (975 mg)-Given       2205 (975 mg)-Given        0550 (975 mg)-Given       1522 (975 mg)-Given       -Med Discontinued

## 2018-06-10 NOTE — IP AVS SNAPSHOT
"          [facility]: [phone]                                              INTERAGENCY TRANSFER FORM - PHYSICIAN ORDERS   6/10/2018                    Hospital Admission Date: 6/10/2018  AONOP TREVIZO   : 1965  Sex: Female        Attending Provider: Jasper Persaud MD     Allergies:  No Known Allergies    Infection:  None   Service:  HOSPITALIST    Ht:  1.651 m (5' 5\")   Wt:  82.6 kg (182 lb)   Admission Wt:  82.6 kg (182 lb)    BMI:  30.29 kg/m 2   BSA:  1.95 m 2            Patient PCP Information     Provider PCP Type    [facility]      ED Clinical Impression     Diagnosis Description Comment Added By Time Added    Cauda equina compression (H) [G83.4] Cauda equina compression (H) [G83.4]  Robin Triana MD 6/10/2018 10:34 AM    Urinary retention [R33.9] Urinary retention [R33.9]  Robin Triana MD 6/10/2018 10:34 AM    Acute bilateral low back pain without sciatica [M54.5] Acute bilateral low back pain without sciatica [M54.5]  Robin Trinaa MD 6/10/2018 10:34 AM      Hospital Problems as of 2018              Priority Class Noted POA    Cauda equina compression (H) Medium  6/10/2018 Yes    S/P lumbar fusion Medium  6/10/2018 Yes      Non-Hospital Problems as of 2018     None      Code Status History     Date Active Date Inactive Code Status Order ID Comments User Context    6/15/2018  9:27 AM  Full Code [id number]  Victor Hugo Riley MD Outpatient    6/10/2018 12:00 PM 6/15/2018  9:27 AM Full Code [id number]  Jasper Persaud MD Inpatient         Medication Review      START taking        Dose / Directions Comments    nicotine polacrilex 2 MG lozenge   Commonly known as:  COMMIT   Used for:  Tobacco use disorder        Dose:  2 mg   Place 1 lozenge (2 mg) inside cheek every hour as needed for smoking cessation   Quantity:  360 lozenge   Refills:  0        " oxyCODONE IR 5 MG tablet   Commonly known as:  ROXICODONE        Dose:  2.5 mg   Take 0.5 tablets (2.5 mg) by mouth every 6 hours as needed for other (pain control or improvement in physical function. Hold dose for analgesic side effects.)   Quantity:  6 tablet   Refills:  0        polyethylene glycol Packet   Commonly known as:  MIRALAX/GLYCOLAX        Dose:  17 g   Take 17 g by mouth daily as needed for constipation   Quantity:  7 packet   Refills:  0          CONTINUE these medications which may have CHANGED, or have new prescriptions. If we are uncertain of the size of tablets/capsules you have at home, strength may be listed as something that might have changed.        Dose / Directions Comments    clonazePAM 1 MG tablet   Commonly known as:  klonoPIN   This may have changed:    - medication strength  - how much to take  - how to take this  - when to take this  - additional instructions  - Another medication with the same name was removed. Continue taking this medication, and follow the directions you see here.   Used for:  Anxiety disorder, unspecified type        Take 1 tablet (1 mg) by mouth in the morning and at bedtime   Refills:  0          CONTINUE these medications which have NOT CHANGED        Dose / Directions Comments    albuterol 108 (90 Base) MCG/ACT Inhaler   Commonly known as:  PROAIR HFA/PROVENTIL HFA/VENTOLIN HFA        Dose:  1-2 puff   Inhale 1-2 puffs into the lungs every 4 hours as needed for shortness of breath / dyspnea or wheezing   Refills:  0        ciclopirox 8 % Soln        Apply to adjacent skin and affected nails daily.  Remove with alcohol every 7 days, then repeat.   Refills:  0        clotrimazole 1 % cream   Commonly known as:  LOTRIMIN        Apply topically 2 times daily To the affected toe.   Refills:  0        dolutegravir 50 MG tablet   Commonly known as:  TIVICAY        Dose:  50 mg   Take 50 mg by mouth daily   Refills:  0        emtricitabine-tenofovir 200-300 MG per  tablet   Commonly known as:  TRUVADA        Dose:  1 tablet   Take 1 tablet by mouth daily   Refills:  0        ketoconazole 2 % shampoo   Commonly known as:  NIZORAL        Apply topically daily as needed for itching or irritation   Refills:  0        QUETIAPINE FUMARATE PO        Dose:  200 mg   Take 200 mg by mouth At Bedtime   Refills:  0        SERTRALINE HCL PO        Dose:  100 mg   Take 100 mg by mouth daily   Refills:  0          STOP taking     HYDROcodone-acetaminophen 5-325 MG per tablet   Commonly known as:  NORCO                   Summary of Visit     Reason for your hospital stay       You were hospitalized for cauda equina syndrome, and had lumbar fusion surgery.             After Care     Activity       Discharge instructions:  No lifting of more than 10 pounds, no bending, no twisting until follow up visit.    Ok to shampoo hair, shower or bathe, but, do not scrub or submerge incision under water until evaluated post-operatively in clinic.    Ok to walk as tolerated, avoid bed rest and prolonged sitting.    No contact sports until after follow up visit  No high impact activities such as; running/jogging, snowmobile or 4 elkins riding or any other recreational vehicles.    Do not take NSAIDS (ibuprofen, advil, aleve, naproxen, etc) for pain control.    Do NOT drive while taking narcotic pain medication.    Call the [facility] at [phone] for increasing redness, swelling or pus draining from the incision, increased pain or any other questions and concerns.       Activity - Up with assistive device           Advance Diet as Tolerated       Follow this diet upon discharge: Orders Placed This Encounter      Combination Diet Regular Diet Adult       Ingram catheter       To straight gravity drainage. Change catheter every 2 weeks and PRN for leaking or decreased uring output with signs of bladder distention. DO NOT change catheter without a specific MD order IF diagnosis of benign  prostatic hypertrophy (BPH), neurogenic bladder, or other urological conditions       General info for SNF       Length of Stay Estimate: Short Term Care: Estimated # of Days <30  Condition at Discharge: Improving  Level of care:skilled   Rehabilitation Potential: Good  Admission H&P remains valid and up-to-date: Yes  Recent Chemotherapy: N/A  Use Nursing Home Standing Orders: Yes       Mantoux instructions       Give two-step Mantoux (PPD) Per Facility Policy Yes       Wound care and dressings       Keep your incision clean and dry at all times.  OK to remove dressing on postop day 2.  OK to shower on postop day 3 and allow water to run over incision, pat dry after shower.  No bathing swimming or submerging in water.  Call immediately or come to ED if any drainage occurs, or you develop new pain, redness, or swelling.             Referrals     Occupational Therapy Adult Consult       Evaluate and treat as clinically indicated.    Reason:  S/p lumbar fusion       Physical Therapy Adult Consult       Evaluate and treat as clinically indicated.    Reason:  S/p lumbar fusion             Radiology & Cardiology Orders     Future Labs/Procedures Complete By Expires    XR Lumbar Spine 2/3 Views  7/25/2018 (Approximate) 6/13/2019      Radiology & Cardiology Orders     XR Lumbar Spine 2/3 Views                 Follow-Up Appointment Instructions     Future Labs/Procedures    Follow-up and recommended labs and tests      Comments:    Please [facility] in 6 weeks with xray prior.  Please call the clinic at [phone] to schedule your appointment with Eze Jarrett PA-C or Hien Tran PA-C.     Staple removal 6/24/2018 may be done at rehab facility.    Follow up with psych provider Meena Delgadillo MS CNP next month.      Follow-Up Appointment Instructions     Follow-up and recommended labs and tests        Please follow up at [facility] in 6 weeks with xray prior.  Please call the  clinic at [phone] to schedule your appointment with Eze Jarrett PA-C or Hien Tarn PA-C.     Staple removal 6/24/2018 may be done at rehab facility.    Follow up with psych provider Meena Delgadillo MS CNP next month.             Statement of Approval     Ordered          06/16/18 1006  I have reviewed and agree with all the recommendations and orders detailed in this document.  EFFECTIVE NOW     Approved and electronically signed by:  Eze Jarrett PA-C           06/15/18 0927  I have reviewed and agree with all the recommendations and orders detailed in this document.  EFFECTIVE NOW     Approved and electronically signed by:  Victor Hugo Riley MD

## 2018-06-10 NOTE — IP AVS SNAPSHOT
MRN:[id number]                      After Visit Summary   6/10/2018    Annika Garrido    MRN: [id number]           Thank you!     Thank you for choosing [facility] for your care. Our goal is always to provide you with excellent care. Hearing back from our patients is one way we can continue to improve our services. Please take a few minutes to complete the written survey that you may receive in the mail after you visit with us. Thank you!        Patient Information     Date Of Birth          1965        Designated Caregiver       Most Recent Value    Caregiver    Will someone help with your care after discharge? yes    Name of designated caregiver Denzel Alvarez, spouse    Phone number of caregiver see facesheet    Caregiver address see facesheet      About your hospital stay     You were admitted on:  Karina 10, 2018 You last received care in the:  [place] [address] [facility] [place]    You were discharged on:  June 16, 2018        Reason for your hospital stay       You were hospitalized for cauda equina syndrome, and had lumbar fusion surgery.                  Who to Call     For medical emergencies, please call 911.  For non-urgent questions about your medical care, please call your primary care provider or clinic, [phone]  For questions related to your surgery, please call your surgery clinic        Attending Provider     Provider Specialty    Robin Triana MD Emergency Medicine    [facility]    [facility]       Primary Care Provider Office Phone # Fax #    [facility] [phone]      After Care Instructions     Activity       Discharge instructions:  No lifting of more than 10 pounds, no bending, no twisting until follow up visit.    Ok to shampoo hair, shower or bathe, but, do not scrub or submerge incision under water until evaluated post-operatively in clinic.    Ok to  walk as tolerated, avoid bed rest and prolonged sitting.    No contact sports until after follow up visit  No high impact activities such as; running/jogging, snowmobile or 4 elkins riding or any other recreational vehicles.    Do not take NSAIDS (ibuprofen, advil, aleve, naproxen, etc) for pain control.    Do NOT drive while taking narcotic pain medication.    Call the [facility] at [phone] for increasing redness, swelling or pus draining from the incision, increased pain or any other questions and concerns.            Activity - Up with assistive device           Advance Diet as Tolerated       Follow this diet upon discharge: Orders Placed This Encounter      Combination Diet Regular Diet Adult            Discharge Instructions       Recommend trial to void in approx 1 week, around 6/21. Follow up with urology in approximately 2 weeks if patient fails trial to void            Ingram catheter       To straight gravity drainage. Change catheter every 2 weeks and PRN for leaking or decreased uring output with signs of bladder distention. DO NOT change catheter without a specific MD order IF diagnosis of benign prostatic hypertrophy (BPH), neurogenic bladder, or other urological conditions            General info for SNF       Length of Stay Estimate: Short Term Care: Estimated # of Days <30  Condition at Discharge: Improving  Level of care:skilled   Rehabilitation Potential: Good  Admission H&P remains valid and up-to-date: Yes  Recent Chemotherapy: N/A  Use Nursing Home Standing Orders: Yes            Mantoux instructions       Give two-step Mantoux (PPD) Per Facility Policy Yes            Wound care and dressings       Keep your incision clean and dry at all times.  OK to remove dressing on postop day 2.  OK to shower on postop day 3 and allow water to run over incision, pat dry after shower.  No bathing swimming or submerging in water.  Call immediately or come to ED if any drainage occurs, or  you develop new pain, redness, or swelling.                  Follow-up Appointments     Follow-up and recommended labs and tests        Please follow up [facility] in 6 weeks with xray prior.  Please call the clinic at [phone] to schedule your appointment with Eze Jarrett PA-C or Hien Tran PA-C.     Staple removal 6/24/2018 may be done at rehab facility.    Follow up with urology in approx 2 weeks.    Follow up with psych provider Meena Delgadillo MS CNP next month.                  Additional Services     Occupational Therapy Adult Consult       Evaluate and treat as clinically indicated.    Reason:  S/p lumbar fusion            Physical Therapy Adult Consult       Evaluate and treat as clinically indicated.    Reason:  S/p lumbar fusion                  Future tests that were ordered for you     XR Lumbar Spine 2/3 Views                 Further instructions from your care team       Pt to d/c to [facility] acute hospital via spouse at 4200.     [facility]  Dr. Quinn Discharge Instructions Following Spine Surgery  [phone]  Monday - Friday; 8:00 AM - 4:00 PM    In General:   After you have had surgery on your spine, remember do not twist, or excessively flex or extend the area that you had surgery.  These activities can prevent healing.  Pain is normal and to be expected following surgery.  Please call our office to schedule your appointment follow up appointment.      Bowel Care:  Many people have constipation (hard stools) after surgery.  To help prevent constipation: Drink plenty of fluid (8-10 glasses/day); Eat more fiber, such as whole grain bread, bran cereal, and fruits and vegetables; Stay active by walking; Over the counter stool softener may also help.      Medications:  Spine surgery and pain management is unique to all patients.  You will generally be given medications for pain, muscle spasms or tightness, and for constipation during  the immediate post op period.  It is important that you use these as prescribed.  Please remember to bring your pill bottles to all of your appointments. Avoid alcoholic beverages while taking narcotic pain medications. You can use ice to areas of pain as needed, 20 minutes at a time.  Changing positions and walking will help loosen your muscles as well.    Driving:  No driving while on narcotic pain medications.  It is state law not to drive while under the influence of a drug to a degree which renders you incapable of safely driving.  The narcotic medication you will be taking after surgery falls under this category.     Activity:   After surgery, most people feel less pain than they have had in a long time.  Walking and light activities will help you regain the use of your muscles.  You are encouraged to walk: start with short walks 5-10 minutes at a time for 4-5 times per day and increase as tolerated.  Stair climbing as tolerated, we recommend you use the railing. No lifting greater than 10 pounds: approximately equal to one gallon of milk. No twisting, bending in the area you have had surgery. No housework, vacuuming, laundry, leaf raking, lawn mowing, or snow removal. Wear your brace (if ordered) as directed.    Showers:  If you have sutures or staples you may shower two days after surgery. It is ok to let water run over your incision but do not touch or scrub on the incision. Pat dry immediately after showering. If there is a dressing in place, you may remove it 2 days after surgery. If you were closed with Derma kramer (glue), you may shower without covering the incision. No baths, hot tubs, or pool activity for at least 6 weeks.     Nutrition:  In general, your diet restrictions will not change with your surgery.  You may need to eat small frequent meals initially until your appetite returns.  Eat plenty of high fiber foods and drink plenty of fluids. If you do not have a fluid restriction from or prior to  surgery, we recommend 6-8 (8oz) glasses of water per day. Other fluids are fine, but water is best. Nausea is not uncommon; it is a common side effect to many pain medications.  We recommend that you take the pain medications with food, if this does not improve your symptoms, please call us.     Smoking:  For proper healing it is required that you quit using all tobacco products.  This includes smoking, chewing, nicotine gums, and nicotine patches.  Call Dr. Quinn if these occur: Drainage from your incision, increased pain/redness/swelling, temperatures greater than 101.5, increased leg pain or swelling or unrelieved headaches    Go to the nearest Emergency Room if you experience: chest pain, shortness of breath, neck swelling or swallowing problems            Pending Results     Date and Time Order Name Status Description    6/14/2018 0856 Blood culture Preliminary     6/14/2018 0856 Blood culture Preliminary     6/13/2018 0800 Blood culture Preliminary             Statement of Approval     Ordered          06/16/18 1237  I have reviewed and agree with all the recommendations and orders detailed in this document.  EFFECTIVE NOW     Approved and electronically signed by:  Victor Hugo Riley MD           06/16/18 1006  I have reviewed and agree with all the recommendations and orders detailed in this document.  EFFECTIVE NOW     Approved and electronically signed by:  Eze Jarrett PA-C           06/15/18 0359  I have reviewed and agree with all the recommendations and orders detailed in this document.  EFFECTIVE NOW     Approved and electronically signed by:  Victor Hugo Riley MD             Admission Information     Date & Time Provider Department Dept. Phone    6/10/2018 Jasper Persaud MD [address] [facility] [phone]      Your Vitals Were     Blood Pressure Pulse Temperature Respirations Height Weight    95/62 (BP Location: Right arm) 90 98.4  F (36.9  C) (Oral) 16 1.651 m  "(5' 5\") 82.6 kg (182 lb)    Last Period Pulse Oximetry BMI (Body Mass Index)             (LMP Unknown) 96% 30.29 kg/m2         bitmovin Information     bitmovin lets you send messages to your doctor, view your test results, renew your prescriptions, schedule appointments and more. To sign up, go to [email or web address] . Click on \"Log in\" on the left side of the screen, which will take you to the Welcome page. Then click on \"Sign up Now\" on the right side of the page.     You will be asked to enter the access code listed below, as well as some personal information. Please follow the directions to create your username and password.     Your access code is: [email or web address]  Expires: 2018 12:17 PM     Your access code will  in 90 days. If you need help or a new code, please call your [place] clinic or [phone].        Care EveryWhere ID     This is your Care EveryWhere ID. This could be used by other organizations to access your [place] medical records  [id number]        Equal Access to Services     CASS HERNANDEZ AH: Hadii julio c José, eneida mann, jaden espinoza, sana harman . So [facility] [phone].    ATENCIÓN: Si habla español, tiene a spann disposición servicios gratuitos de asistencia lingüística. Rajan al [phone].    We comply with applicable federal civil rights laws and [place] laws. We do not discriminate on the basis of race, color, national origin, age, disability, sex, sexual orientation, or gender identity.               Review of your medicines      START taking        Dose / Directions    nicotine polacrilex 2 MG lozenge   Commonly known as:  COMMIT   Used for:  Tobacco use disorder        Dose:  2 mg   Place 1 lozenge (2 mg) inside cheek every hour as needed for smoking cessation   Quantity:  360 lozenge   Refills:  0       oxyCODONE IR 5 MG tablet   Commonly known as:  ROXICODONE        Dose:  2.5 mg   Take 0.5 tablets (2.5 mg) by " mouth every 6 hours as needed for other (pain control or improvement in physical function. Hold dose for analgesic side effects.)   Quantity:  6 tablet   Refills:  0       polyethylene glycol Packet   Commonly known as:  MIRALAX/GLYCOLAX        Dose:  17 g   Take 17 g by mouth daily as needed for constipation   Quantity:  7 packet   Refills:  0         CONTINUE these medicines which may have CHANGED, or have new prescriptions. If we are uncertain of the size of tablets/capsules you have at home, strength may be listed as something that might have changed.        Dose / Directions    clonazePAM 1 MG tablet   Commonly known as:  klonoPIN   This may have changed:    - medication strength  - how much to take  - how to take this  - when to take this  - additional instructions  - Another medication with the same name was removed. Continue taking this medication, and follow the directions you see here.   Used for:  Anxiety disorder, unspecified type        Take 1 tablet (1 mg) by mouth in the morning and at bedtime   Refills:  0         CONTINUE these medicines which have NOT CHANGED        Dose / Directions    albuterol 108 (90 Base) MCG/ACT Inhaler   Commonly known as:  PROAIR HFA/PROVENTIL HFA/VENTOLIN HFA        Dose:  1-2 puff   Inhale 1-2 puffs into the lungs every 4 hours as needed for shortness of breath / dyspnea or wheezing   Refills:  0       ciclopirox 8 % Soln        Apply to adjacent skin and affected nails daily.  Remove with alcohol every 7 days, then repeat.   Refills:  0       clotrimazole 1 % cream   Commonly known as:  LOTRIMIN        Apply topically 2 times daily To the affected toe.   Refills:  0       dolutegravir 50 MG tablet   Commonly known as:  TIVICAY        Dose:  50 mg   Take 50 mg by mouth daily   Refills:  0       emtricitabine-tenofovir 200-300 MG per tablet   Commonly known as:  TRUVADA        Dose:  1 tablet   Take 1 tablet by mouth daily   Refills:  0       ketoconazole 2 % shampoo    Commonly known as:  NIZORAL        Apply topically daily as needed for itching or irritation   Refills:  0       QUETIAPINE FUMARATE PO        Dose:  200 mg   Take 200 mg by mouth At Bedtime   Refills:  0       SERTRALINE HCL PO        Dose:  100 mg   Take 100 mg by mouth daily   Refills:  0         STOP taking     HYDROcodone-acetaminophen 5-325 MG per tablet   Commonly known as:  NORCO                Where to get your medicines      Some of these will need a paper prescription and others can be bought over the counter. Ask your nurse if you have questions.     You don't need a prescription for these medications     nicotine polacrilex 2 MG lozenge    polyethylene glycol Packet         Information about where to get these medications is not yet available     ! Ask your nurse or doctor about these medications     clonazePAM 1 MG tablet    oxyCODONE IR 5 MG tablet                Protect others around you: Learn how to safely use, store and throw away your medicines at www.disposemymeds.org.        ANTIBIOTIC INSTRUCTION     You've Been Prescribed an Antibiotic - Now What?  Your healthcare team thinks that you or your loved one might have an infection. Some infections can be treated with antibiotics, which are powerful, life-saving drugs. Like all medications, antibiotics have side effects and should only be used when necessary. There are some important things you should know about your antibiotic treatment.      Your healthcare team may run tests before you start taking an antibiotic.    Your team may take samples (e.g., from your blood, urine or other areas) to run tests to look for bacteria. These test can be important to determine if you need an antibiotic at all and, if you do, which antibiotic will work best.      Within a few days, your healthcare team might change or even stop your antibiotic.    Your team may start you on an antibiotic while they are working to find out what is making you sick.    Your team  might change your antibiotic because test results show that a different antibiotic would be better to treat your infection.    In some cases, once your team has more information, they learn that you do not need an antibiotic at all. They may find out that you don't have an infection, or that the antibiotic you're taking won't work against your infection. For example, an infection caused by a virus can't be treated with antibiotics. Staying on an antibiotic when you don't need it is more likely to be harmful than helpful.      You may experience side effects from your antibiotic.    Like all medications, antibiotics have side effects. Some of these can be serious.    Let you healthcare team know if you have any known allergies when you are admitted to the hospital.    One significant side effect of nearly all antibiotics is the risk of severe and sometimes deadly diarrhea caused by Clostridium difficile (C. Difficile). This occurs when a person takes antibiotics because some good germs are destroyed. Antibiotic use allows C. diificile to take over, putting patients at high risk for this serious infection.    As a patient or caregiver, it is important to understand your or your loved one's antibiotic treatment. It is especially important for caregivers to speak up when patients can't speak for themselves. Here are some important questions to ask your healthcare team.    What infection is this antibiotic treating and how do you know I have that infection?    What side effects might occur from this antibiotic?    How long will I need to take this antibiotic?    Is it safe to take this antibiotic with other medications or supplements (e.g., vitamins) that I am taking?     Are there any special directions I need to know about taking this antibiotic? For example, should I take it with food?    How will I be monitored to know whether my infection is responding to the antibiotic?    What tests may help to make sure the right  antibiotic is prescribed for me?      Information provided by:  www.cdc.gov/getsmart  U.S. Department of Health and Human Services  Centers for disease Control and Prevention  National Center for Emerging and Zoonotic Infectious Diseases  Division of Healthcare Quality Promotion        Information about OPIOIDS     PRESCRIPTION OPIOIDS: WHAT YOU NEED TO KNOW   We gave you an opioid (narcotic) pain medicine. It is important to manage your pain, but opioids are not always the best choice. You should first try all the other options your care team gave you. Take this medicine for as short a time (and as few doses) as possible.     These medicines have risks:    DO NOT drive when on new or higher doses of pain medicine. These medicines can affect your alertness and reaction times, and you could be arrested for driving under the influence (DUI). If you need to use opioids long-term, talk to your care team about driving.    DO NOT operate heave machinery    DO NOT do any other dangerous activities while taking these medicines.     DO NOT drink any alcohol while taking these medicines.      If the opioid prescribed includes acetaminophen, DO NOT take with any other medicines that contain acetaminophen. Read all labels carefully. Look for the word  acetaminophen  or  Tylenol.  Ask your pharmacist if you have questions or are unsure.    You can get addicted to pain medicines, especially if you have a history of addiction (chemical, alcohol or substance dependence). Talk to your care team about ways to reduce this risk.    Store your pills in a secure place, locked if possible. We will not replace any lost or stolen medicine. If you don t finish your medicine, please throw away (dispose) as directed by your pharmacist. The [place] Pollution Control Agency has more information about safe disposal: https://www.pca.[facility].[place].us/living-green/managing-unwanted-medications.     All opioids tend to cause constipation. Drink plenty  of water and eat foods that have a lot of fiber, such as fruits, vegetables, prune juice, apple juice and high-fiber cereal. Take a laxative (Miralax, milk of magnesia, Colace, Senna) if you don t move your bowels at least every other day.              Medication List: This is a list of all your medications and when to take them. Check marks below indicate your daily home schedule. Keep this list as a reference.      Medications           Morning Afternoon Evening Bedtime As Needed    albuterol 108 (90 Base) MCG/ACT Inhaler   Commonly known as:  PROAIR HFA/PROVENTIL HFA/VENTOLIN HFA   Inhale 1-2 puffs into the lungs every 4 hours as needed for shortness of breath / dyspnea or wheezing                                   ciclopirox 8 % Soln   Apply to adjacent skin and affected nails daily.  Remove with alcohol every 7 days, then repeat.   Last time this was given:  resume                                clonazePAM 1 MG tablet   Commonly known as:  klonoPIN   Take 1 tablet (1 mg) by mouth in the morning and at bedtime   Last time this was given:  1 mg on 6/16/2018  8:08 AM                                clotrimazole 1 % cream   Commonly known as:  LOTRIMIN   Apply topically 2 times daily To the affected toe.                                      dolutegravir 50 MG tablet   Commonly known as:  TIVICAY   Take 50 mg by mouth daily   Last time this was given:  50 mg on 6/16/2018  8:08 AM                                   emtricitabine-tenofovir 200-300 MG per tablet   Commonly known as:  TRUVADA   Take 1 tablet by mouth daily   Last time this was given:  1 tablet on 6/16/2018  8:08 AM                                   ketoconazole 2 % shampoo   Commonly known as:  NIZORAL   Apply topically daily as needed for itching or irritation                                   nicotine polacrilex 2 MG lozenge   Commonly known as:  COMMIT   Place 1 lozenge (2 mg) inside cheek every hour as needed for smoking cessation   Last time this was  given:  2 mg on 6/15/2018  4:05 PM                                   oxyCODONE IR 5 MG tablet   Commonly known as:  ROXICODONE   Take 0.5 tablets (2.5 mg) by mouth every 6 hours as needed for other (pain control or improvement in physical function. Hold dose for analgesic side effects.)   Last time this was given:  2.5 mg on 6/16/2018 12:44 PM                         Last given at 12:45pm next dose avail 7:45pm       polyethylene glycol Packet   Commonly known as:  MIRALAX/GLYCOLAX   Take 17 g by mouth daily as needed for constipation   Last time this was given:  17 g on 6/16/2018 12:44 PM                                   QUETIAPINE FUMARATE PO   Take 200 mg by mouth At Bedtime   Last time this was given:  200 mg on 6/15/2018  9:11 PM                                   SERTRALINE HCL PO   Take 100 mg by mouth daily   Last time this was given:  100 mg on 6/16/2018  8:08 AM

## 2018-06-10 NOTE — IP AVS SNAPSHOT
"` `           [facility]: [phone]                                              INTERAGENCY TRANSFER FORM - NURSING   6/10/2018                    Hospital Admission Date: 6/10/2018  ANOOP TREVIZO   : 1965  Sex: Female        Attending Provider: Jasper Persaud MD     Allergies:  No Known Allergies    Infection:  None   Service:  HOSPITALIST    Ht:  1.651 m (5' 5\")   Wt:  82.6 kg (182 lb)   Admission Wt:  82.6 kg (182 lb)    BMI:  30.29 kg/m 2   BSA:  1.95 m 2            Patient PCP Information     Provider PCP Type    [facility]      Current Code Status     Date Active Code Status Order ID Comments User Context       Prior      Code Status History     Date Active Date Inactive Code Status Order ID Comments User Context    6/15/2018  9:27 AM  Full Code [id number]  Victor Hugo Riley MD Outpatient    6/10/2018 12:00 PM 6/15/2018  9:27 AM Full Code [id number]  Jasper Persaud MD Inpatient      Advance Directives        Scanned docmt in ACP Activity?           No scanned doc        Hospital Problems as of 2018              Priority Class Noted POA    Cauda equina compression (H) Medium  6/10/2018 Yes    S/P lumbar fusion Medium  6/10/2018 Yes      Non-Hospital Problems as of 2018     None      Immunizations     None         END      ASSESSMENT     Discharge Profile Flowsheet     EXPECTED DISCHARGE     GI Signs/Symptoms  fecal incontinence (at times) 18 2116    Expected Discharge Date  06/15/18 (when afebrile) 18 0927   Passing flatus  yes 18 0849    DISCHARGE NEEDS ASSESSMENT     COMMUNICATION ASSESSMENT      Equipment Currently Used at Home  walker, rolling 18 1202   Patient's communication style  spoken language (English or Bilingual) 06/10/18 1218    Transportation Available  taxi 18 0854   FINAL RESOURCES      FUNCTIONAL LEVEL CURRENT     PAS Number  [id number] 18 1228    Ambulation  3 - " "assistive equipment and person 06/16/18 1303   SKIN      Transferring  3 - assistive equipment and person 06/16/18 1303   Inspection of bony prominences  Full 06/16/18 0849    Toileting  3 - assistive equipment and person 06/16/18 1303   Procedural focused assessment (identify areas inspected)   Cheek, left;Cheek, right;Hip, right;Hip, left;Knee, right;Knee, left (bilateral posterior upper legs (cautery pad sites)) 06/10/18 2022    Bathing  2 - assistive person 06/16/18 1303   Inspection under devices  Full 06/16/18 0849    Dressing  2 - assistive person 06/16/18 1303   Skin WDL  ex 06/16/18 0849    Eating  0 - independent 06/16/18 1303   Skin Temperature  warm 06/15/18 2029    Communication  0 - understands/communicates without difficulty 06/16/18 1303   Skin Integrity  incision(s) 06/16/18 0849    Swallowing  0 - swallows foods/liquids without difficulty 06/16/18 1303   Skin Color/Characteristics  without discoloration 06/13/18 1026    GASTROINTESTINAL (ADULT,PEDIATRIC,OB)     Skin Moisture  dry 06/14/18 1048    GI WDL  WDL 06/16/18 0849   Skin Elasticity  quick return to original state 06/14/18 1048    Abdominal Appearance  obese 06/14/18 2116   SAFETY      All Quadrants Bowel Sounds  audible and active in all quadrants 06/16/18 0849   Safety WDL  WDL 06/16/18 0849    Last Bowel Movement  06/14/18 06/15/18 1316   All Alarms  alarm(s) activated and audible 06/16/18 0849                 Assessment WDL (Within Defined Limits) Definitions           Safety WDL     Effective: 09/28/15    Row Information: <b>WDL Definition:</b> Bed in low position, wheels locked; call light in reach; upper side rails up x 2; ID band on<br> <font color=\"gray\"><i>Item=AS safety wdl>>List=AS safety wdl>>Version=F14</i></font>      Skin WDL     Effective: 09/28/15    Row Information: <b>WDL Definition:</b> Warm; dry; intact; elastic; without discoloration; pressure points without redness<br> <font color=\"gray\"><i>Item=AS skin wdl>>List=AS " "skin wdl>>Version=F14</i></font>      Vitals     Vital Signs Flowsheet     COMMENTS     Change in Pain  getting better 06/10/18 1132    Comments  After ambulating to/from bathroom 06/14/18 0906   ANALGESIA SIDE EFFECTS MONITORING      VITAL SIGNS     Side Effects Monitoring: Respiratory Quality  R 06/16/18 1254    Temp  98.4  F (36.9  C) 06/16/18 1239   Side Effects Monitoring: Respiratory Depth  N 06/16/18 1254    Temp src  Oral 06/16/18 1239   Side Effects Monitoring: Sedation Level  1 06/16/18 1254    Resp  16 06/16/18 1239   HEIGHT AND WEIGHT      Pulse  90 06/14/18 0543   Height  1.651 m (5' 5\") 06/10/18 0741    Heart Rate  83 06/16/18 1239   Height Method  Stated 06/10/18 0741    Pulse/Heart Rate Source  Monitor 06/16/18 1239   Weight  82.6 kg (182 lb) 06/10/18 0741    BP  95/62 06/16/18 1239   BSA (Calculated - sq m)  1.95 06/10/18 0741    BP Location  Right arm 06/16/18 1239   BMI (Calculated)  30.35 06/10/18 0741    OXYGEN THERAPY     NIXON COMA SCALE      SpO2  96 % 06/16/18 1239   Best Eye Response  4-->(E4) spontaneous 06/11/18 0827    O2 Device  None (Room air) 06/16/18 1239   Best Motor Response  6-->(M6) obeys commands 06/11/18 0827    Oxygen Delivery  2 LPM 06/11/18 1930   Best Verbal Response  5-->(V5) oriented 06/11/18 0827    RESPIRATORY MONITORING     Telford Coma Scale Score  15 06/11/18 0827    Respiratory Monitoring (EtCO2)  44 mmHg 06/11/18 1930   POSITIONING      Integrated Pulmonary Index (IPI)  8-9 06/11/18 1930   Body Position  independently positioning;supine, head elevated 06/16/18 1239    PAIN/COMFORT     Head of Bed (HOB)  HOB at 20-30 degrees 06/16/18 1239    Patient Currently in Pain  yes 06/16/18 1254   Chair  Upright in chair 06/14/18 1438    Preferred Pain Scale  number (Numeric Rating Pain Scale) 06/16/18 1254   Positioning/Transfer Devices  pillows;in use 06/16/18 1239    0-10 Pain Scale  10 06/16/18 1246   DAILY CARE      FACES Pain Rating  2-->hurts little bit 06/10/18 1209 "   Activity Management  dorsiflexion, plantar flexion encouraged;plantar flexion encouraged;activity encouraged;activity adjusted per tolerance;up in chair 06/16/18 0849    Pain Location  Back 06/16/18 1254   Activity Assistance Provided  assistance, 1 person 06/16/18 0849    Pain Orientation  Lower 06/16/18 1254   Assistive Device Utilized  gait belt;walker 06/16/18 0849    Pain Descriptors  Aching 06/16/18 0844   Additional Documentation  Activity Device Assistance (Row) 06/11/18 1906    Pain Intervention(s)  Medication (See eMAR) 06/16/18 1254   ECG      Response to Interventions  Absence of nonverbal indicators of pain 06/16/18 1121   ECG Rhythm  Sinus rhythm 06/10/18 2050    CLINICALLY ALIGNED PAIN ASSESSMENT (CAPA) ([facility], [facility] AND [facility] ADULTS ONLY)                   Patient Lines/Drains/Airways Status    Active LINES/DRAINS/AIRWAYS     Name: Placement date: Placement time: Site: Days: Last dressing change:    Urethral Catheter Straight-tip 06/15/18   0548   Straight-tip   1     Incision/Surgical Site 06/10/18 Back 06/10/18   1822    5             Patient Lines/Drains/Airways Status    Active PICC/CVC     None            Intake/Output Detail Report     Date Intake     Output     Net    Shift P.O. I.V. IV Piggyback Total Urine Drains Blood Total       Noc 06/14/18 2300 - 06/15/18 0659 100 -- -- 100 1075 -- -- 1075 -975    Day 06/15/18 0700 - 06/15/18 1459 680 -- -- 680 600 -- -- 600 80    Genesis 06/15/18 1500 - 06/15/18 2259 500 -- -- 500 450 -- -- 450 50    Noc 06/15/18 2300 - 06/16/18 0659 50 -- -- 50 500 -- -- 500 -450    Day 06/16/18 0700 - 06/16/18 1459 400 -- -- 400 300 -- -- 300 100      Last Void/BM       Most Recent Value    Urine Occurrence 0 at 06/15/2018 0538    Stool Occurrence 1 at 06/13/2018 2104      Case Management/Discharge Planning     Case Management/Discharge Planning Flowsheet     REFERRAL INFORMATION     Transportation Available  taxi 06/14/18 0854    Reason For Consult  discharge  planning 06/16/18 1228   FINAL RESOURCES      Record Reviewed  medical record 06/16/18 1228   Equipment Currently Used at Home  walker, rolling 06/11/18 1202    CTS Assigned to Case  Angelito Sanchez 06/16/18 1228   PAS Number  [id number] 06/16/18 1228    LIVING ENVIRONMENT     ABUSE RISK SCREEN      Lives With  spouse;grandchild(angel) 06/14/18 0854   QUESTION TO PATIENT:  Has a member of your family or a partner(now or in the past) intimidated, hurt, manipulated, or controlled you in any way?  no 06/10/18 1218    Living Arrangements  house 06/14/18 0854   QUESTION TO PATIENT: Do you feel safe going back to the place where you are living?  yes 06/10/18 1218    COPING/STRESS     OBSERVATION: Is there reason to believe there has been maltreatment of a vulnerable adult (ie. Physical/Sexual/Emotional abuse, self neglect, lack of adequate food, shelter, medical care, or financial exploitation)?  no 06/10/18 1218    Major Change/Loss/Stressor  illness;hospitalization 06/11/18 0412   OTHER      EXPECTED DISCHARGE     Are you depressed or being treated for depression?  Yes 06/11/18 0412    Expected Discharge Date  06/15/18 (when afebrile) 06/14/18 0996   HOMICIDE RISK      DISCHARGE PLANNING     Feels Like Hurting Others  no 06/10/18 0790

## 2018-06-10 NOTE — IP AVS SNAPSHOT
` ` Patient Information     Patient Name Sex     Annika Garrido ([id number]) Female 1965       Room Bed    0714 0714-      Patient Demographics     Address Phone    [address] [address] [phone] (Home)  none (Work)  [phone] (Mobile)      Patient Ethnicity & Race     Ethnic Group Patient Race    American       Emergency Contact(s)     Name Relation Home Work Mobile    golden desouza Relative [phone] none none    burak dumas Spouse [phone] none none      Documents on File        Status Date Received Description       Documents for the Patient    Consent for EHR Access Received 10/09/17     Insurance Card Received 10/09/17     External Medication Information Consent       Patient ID Received 10/09/17     [facility] Specified Other       Consent for Services/Privacy Notice - Hospital/Clinic Received 10/09/17     Privacy Notice - [place] Received 10/09/17     Consent for Services - Hospital and Clinic Received 06/10/18     HIE Auth Received 06/10/18        Documents for the Encounter    CMS IM for Patient Signature       EMS/Ambulance Record  18 [facility]    Assessment/Questionnaire  18 MRI HISTORY QUESTIONNAIRE AND CONTRAST NOTICE    Discharge Attachment  (Deleted)  LAMINECTOMY (ENGLISH)      Admission Information     Attending Provider Admitting Provider Admission Type Admission Date/Time    Jasper Persaud MD Comnick, Mark Brian, MD Emergency 06/10/18  0738    Discharge Date Hospital Service [facility]Cert Status [facility]    [facility]Bed Admission Status        [facility] 0714/0714-01 Admission (Confirmed)       Admission     Complaint    Cauda equina compression (H), Cauda Equinae, S/P lumbar fusion, S/P lumbar fusion      Hospital Account     Name Acct ID Class Status Primary Coverage    Annika Garrido [id number] Inpatient Open UCARE - UCARE  [place]CT [place] ONLY            Guarantor Account (for Hospital Account #[id number])     Name Relation to Pt Service Area Active? Acct Type    Annika Garrido Self FCS Yes Personal/Family    Address Phone          [address] [place] [place] [address] [phone](H)  none(O)              Coverage Information (for Hospital Account #[id number])     F/O Payor/Plan Precert #    UCARE/UCARE [place] ONLY     Subscriber Subscriber #    Annika Garrido [id number]    Address Phone    [address]  [place] [address] [phone]

## 2018-06-10 NOTE — PLAN OF CARE
RECEIVING UNIT ED HANDOFF REVIEW    ED Nurse Handoff Report was reviewed by: Sara Liu on Karina 10, 2018 at 11:14 AM

## 2018-06-10 NOTE — ED NOTES
"[facility]  ED Nurse Handoff Report    ED Chief complaint: Numbness      ED Diagnosis:   Final diagnoses:   None       Code Status: Full Code    Allergies: No Known Allergies    Activity level - Baseline/Home:  Independent    Activity Level - Current:   Stand with Assist of a walker.      Needed?: No    Isolation: No  Infection: Not Applicable    Bariatric?: No    Vital Signs:   Vitals:    06/10/18 0740   BP: (!) 180/116   Pulse: 58   Resp: 14   Temp: 97  F (36.1  C)   TempSrc: Oral   SpO2: 100%   Weight: 82.6 kg (182 lb)   Height: 1.651 m (5' 5\")       Cardiac Rhythm: ,        Pain level: 0-10 Pain Scale: 5    Is this patient confused?: No   Suicidality: Screened negative    Patient Report: Initial Complaint: Had been to Doctor two days ago from today for back pain. Pt having issues with urinating frequency and unable to fully empty bladder. Pt also complaining that she has a weird numb feeling down her right leg.   Focused Assessment: Alert but some confusion behaviors for example she went to the Bathroom in the Er and almost proceeded to walk down the hallway in just her t-shirt  No pants of any kind. A gown was placed. Otherwise alert.   Tests Performed: Uine and MRI of spine  Abnormal Results: MRI shows Cauti Iquina    Spinal Nerve compressed   Treatments provided: pt to be admitted Md harman with Neuro spine.     Family Comments: NA     OBS brochure/video discussed/provided to patient: N/A    ED Medications: Medications - No data to display    Drips infusing?:  No    For the majority of the shift this patient was Green.   Interventions performed were NA.    Severe Sepsis OR Septic Shock Diagnosis Present: No      ED NURSE PHONE NUMBER: [phone]  Margie"

## 2018-06-10 NOTE — PHARMACY-ADMISSION MEDICATION HISTORY
Admission medication history interview status for the 6/10/2018  admission is complete. See EPIC admission navigator for prior to admission medications     Medication history source reliability:Moderate    Actions taken by pharmacist (provider contacted, etc):None     Additional medication history information not noted on PTA med list :None    Medication reconciliation/reorder completed by provider prior to medication history? No    Time spent in this activity: 15 minutes    Prior to Admission medications    Medication Sig Last Dose Taking? Auth Provider   albuterol (PROAIR HFA/PROVENTIL HFA/VENTOLIN HFA) 108 (90 Base) MCG/ACT Inhaler Inhale 1-2 puffs into the lungs every 4 hours as needed for shortness of breath / dyspnea or wheezing Past Week at Unknown time Yes Unknown, Entered By History   ciclopirox 8 % SOLN Apply to adjacent skin and affected nails daily.  Remove with alcohol every 7 days, then repeat. 6/9/2018 at Unknown time Yes Unknown, Entered By History   CLONAZEPAM PO Take 2 mg by mouth every morning 6/9/2018 at AM Yes Unknown, Entered By History   CLONAZEPAM PO Take 1 mg by mouth At Bedtime 6/9/2018 at PM Yes Unknown, Entered By History   clotrimazole (LOTRIMIN) 1 % cream Apply topically 2 times daily To the affected toe. Past Week at Unknown time Yes Unknown, Entered By History   dolutegravir (TIVICAY) 50 MG tablet Take 50 mg by mouth daily 6/9/2018 at Unknown time Yes Unknown, Entered By History   emtricitabine-tenofovir (TRUVADA) 200-300 MG per tablet Take 1 tablet by mouth daily 6/9/2018 at Unknown time Yes Unknown, Entered By History   HYDROcodone-acetaminophen (NORCO) 5-325 MG per tablet Take 1 tablet by mouth every 8 hours as needed for severe pain 6/9/2018 at Unknown time Yes Unknown, Entered By History   ketoconazole (NIZORAL) 2 % shampoo Apply topically daily as needed for itching or irritation Past Week at Unknown time Yes Unknown, Entered By History   QUETIAPINE FUMARATE PO Take 200 mg by  mouth At Bedtime 6/9/2018 at PM Yes Unknown, Entered By History   SERTRALINE HCL PO Take 100 mg by mouth daily 6/9/2018 at Unknown time Yes Unknown, Entered By History

## 2018-06-10 NOTE — ED NOTES
Bed: ED27  Expected date:   Expected time:   Means of arrival:   Comments:  [facility] 446 52F leg numbness - ETA 5min

## 2018-06-10 NOTE — PLAN OF CARE
Problem: Patient Care Overview  Goal: Plan of Care/Patient Progress Review  Outcome: No Change  A&Ox4. CMS intact, except bilat numbness and tingling from buttock, wrapping around waist and down to right above knees. Bowel sounds active. VSS, within parameters. Ingram placed, 925 o/p. Up with SBA. C/o back pain, decreased with repo and norcoX1. Plan for surgery @ 1600.
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
## 2018-06-10 NOTE — ANESTHESIA PREPROCEDURE EVALUATION
Anesthesia Evaluation     .             ROS/MED HX    ENT/Pulmonary:     (+)tobacco use, Current use asthma , . .   (-) COPD and sleep apnea   Neurologic:       Cardiovascular:        (-) hypertension and CAD   METS/Exercise Tolerance:     Hematologic:         Musculoskeletal:         GI/Hepatic:        (-) GERD and liver disease   Renal/Genitourinary:      (-) renal disease   Endo:      (-) Type I DM and Type II DM   Psychiatric:     (+) psychiatric history bipolar      Infectious Disease:   (+) HIV,       Malignancy:         Other: Comment: Procedure: Procedure(s):  OPTICAL TRACKING SYSTEM FUSION SPINE POSTERIOR LUMBAR ONE LEVEL  Preop diagnosis: Cauda Equinae    No Known Allergies  Past Medical History:  No date: Asymptomatic human immunodeficiency virus (HIV*  History reviewed. No pertinent surgical history.  Smoking status: Current Every Day Smoker                                                   Packs/day: 0.25      Years: 0.00      Smokeless status: Never Used                      Alcohol use: No              Prior to Admission medications :  Medication albuterol (PROAIR HFA/PROVENTIL HFA/VENTOLIN HFA) 108 (90 Base) MCG/ACT Inhaler, Sig Inhale 1-2 puffs into the lungs every 4 hours as needed for shortness of breath / dyspnea or wheezing, Start Date , End Date , Taking? Yes, Authorizing Provider Unknown, Entered By History    Medication ciclopirox 8 % SOLN, Sig Apply to adjacent skin and affected nails daily.  Remove with alcohol every 7 days, then repeat., Start Date , End Date , Taking? Yes, Authorizing Provider Unknown, Entered By History    Medication CLONAZEPAM PO, Sig Take 2 mg by mouth every morning, Start Date , End Date , Taking? Yes, Authorizing Provider Unknown, Entered By History    Medication CLONAZEPAM PO, Sig Take 1 mg by mouth At Bedtime, Start Date , End Date , Taking? Yes, Authorizing Provider Unknown, Entered By History    Medication clotrimazole (LOTRIMIN) 1 % cream, Sig Apply topically  2 times daily To the affected toe., Start Date , End Date , Taking? Yes, Authorizing Provider Unknown, Entered By History    Medication dolutegravir (TIVICAY) 50 MG tablet, Sig Take 50 mg by mouth daily, Start Date , End Date , Taking? Yes, Authorizing Provider Unknown, Entered By History    Medication emtricitabine-tenofovir (TRUVADA) 200-300 MG per tablet, Sig Take 1 tablet by mouth daily, Start Date , End Date , Taking? Yes, Authorizing Provider Unknown, Entered By History    Medication HYDROcodone-acetaminophen (NORCO) 5-325 MG per tablet, Sig Take 1 tablet by mouth every 8 hours as needed for severe pain, Start Date , End Date , Taking? Yes, Authorizing Provider Unknown, Entered By History    Medication ketoconazole (NIZORAL) 2 % shampoo, Sig Apply topically daily as needed for itching or irritation, Start Date , End Date , Taking? Yes, Authorizing Provider Unknown, Entered By History    Medication QUETIAPINE FUMARATE PO, Sig Take 200 mg by mouth At Bedtime, Start Date , End Date , Taking? Yes, Authorizing Provider Unknown, Entered By History    Medication SERTRALINE HCL PO, Sig Take 100 mg by mouth daily, Start Date , End Date , Taking? Yes, Authorizing Provider Unknown, Entered By History      Current Facility-Administered Medications Ordered in Epic:  acetaminophen (TYLENOL) tablet 650 mg, 650 mg, Oral, Q4H PRN  albuterol (PROAIR HFA/PROVENTIL HFA/VENTOLIN HFA) Inhaler 1-2 puff, 1-2 puff, Inhalation, Q4H PRN  bisacodyl (DULCOLAX) Suppository 10 mg, 10 mg, Rectal, Daily PRN  clonazePAM (klonoPIN) tablet 1 mg, 1 mg, Oral, At Bedtime  clonazePAM (klonoPIN) tablet 2 mg, 2 mg, Oral, QAM, 2 mg at 06/10/18 1314  dolutegravir (TIVICAY) tablet 50 mg, 50 mg, Oral, Daily  emtricitabine-tenofovir (TRUVADA) 200-300 MG per tablet 1 tablet, 1 tablet, Oral, Daily  hydrALAZINE (APRESOLINE) injection 10 mg, 10 mg, Intravenous, Q4H PRN  HYDROcodone-acetaminophen (NORCO) 5-325 MG per tablet 1-2 tablet, 1-2 tablet, Oral, Q4H  PRN, 1 tablet at 06/10/18 1350  HYDROmorphone (PF) (DILAUDID) injection 0.3-0.5 mg, 0.3-0.5 mg, Intravenous, Q2H PRN  labetalol (NORMODYNE/TRANDATE) injection 10 mg, 10 mg, Intravenous, Q2H PRN  magnesium sulfate 4 g in 100 mL sterile water (premade), 4 g, Intravenous, Q4H PRN  melatonin tablet 1 mg, 1 mg, Oral, At Bedtime PRN  naloxone (NARCAN) injection 0.1-0.4 mg, 0.1-0.4 mg, Intravenous, Q2 Min PRN  nicotine polacrilex (COMMIT) lozenge 2 mg, 2 mg, Buccal, Q1H PRN  ondansetron (ZOFRAN-ODT) ODT tab 4 mg, 4 mg, Oral, Q6H PRN    Or  ondansetron (ZOFRAN) injection 4 mg, 4 mg, Intravenous, Q6H PRN  polyethylene glycol (MIRALAX/GLYCOLAX) Packet 17 g, 17 g, Oral, Daily PRN  potassium chloride (KLOR-CON) Packet 20-40 mEq, 20-40 mEq, Oral or Feeding Tube, Q2H PRN  potassium chloride 10 mEq in 100 mL intermittent infusion with 10 mg lidocaine, 10 mEq, Intravenous, Q1H PRN  potassium chloride 10 mEq in 100 mL sterile water intermittent infusion (premix), 10 mEq, Intravenous, Q1H PRN  potassium chloride 20 mEq in 50 mL intermittent infusion, 20 mEq, Intravenous, Q1H PRN  potassium chloride SA (K-DUR/KLOR-CON M) CR tablet 20-40 mEq, 20-40 mEq, Oral, Q2H PRN  prochlorperazine (COMPAZINE) injection 10 mg, 10 mg, Intravenous, Q6H PRN    Or  prochlorperazine (COMPAZINE) tablet 10 mg, 10 mg, Oral, Q6H PRN    Or  prochlorperazine (COMPAZINE) Suppository 25 mg, 25 mg, Rectal, Q12H PRN  QUEtiapine (SEROquel) tablet 200 mg, 200 mg, Oral, At Bedtime  senna-docusate (SENOKOT-S;PERICOLACE) 8.6-50 MG per tablet 1 tablet, 1 tablet, Oral, BID PRN    Or  senna-docusate (SENOKOT-S;PERICOLACE) 8.6-50 MG per tablet 2 tablet, 2 tablet, Oral, BID PRN  sertraline (ZOLOFT) tablet 100 mg, 100 mg, Oral, Daily, 100 mg at 06/10/18 1314  sodium chloride 0.9% infusion, , Intravenous, Continuous, Last Rate: 100 mL/hr at 06/10/18 1314    No current [facility]-ordered outpatient prescriptions on file.    sodium chloride Last Rate: 100 mL/hr at 06/10/18  1314      Wt Readings from Last 1 Encounters:  06/10/18 : 82.6 kg (182 lb)  Temp Readings from Last 1 Encounters:  06/10/18 : 36.8  C (98.2  F) (Oral)  BP Readings from Last 6 Encounters:  06/10/18 : (!) 151/102  10/09/17 : (!) 148/91  Pulse Readings from Last 4 Encounters:  06/10/18 : 58  10/09/17 : 61  Resp Readings from Last 1 Encounters:  06/10/18 : 14  SpO2 Readings from Last 1 Encounters:  06/10/18 : 97%    Lab Test        06/10/18     10/09/17                       1050          1526          NA           143          139           POTASSIUM    3.5          3.4           CHLORIDE     112*         104           CO2          26           28            ANIONGAP     5            7             GLC          90           101*          BUN          12           9             CR           0.76         0.68          MANOLO          9.0          9.1           Lab Test        06/10/18     10/09/17                       1050          1526          AST          18           13            ALT          21           20            ALKPHOS      100          115           BILITOTAL    0.3          0.3           LIPASE        --          323           Lab Test        06/10/18     10/09/17                       1050          1526          WBC          4.0          5.2           HGB          13.8         14.7          PLT          192          219           Lab Test        06/10/18                       1050          ABO          B             RH           Pos           No results for input(s): INR, PTT in the last 83370 hours.   Lab Test        10/09/17                       1526          TROPI        <0.015        No results for input(s): PH, PCO2, PO2, HCO3 in the last 86140 hours.  Lab Test        06/10/18                       0749          HCG          Negative      Recent Results (from the past 744 hour(s))  -Lumbar spine MRI w/o contrast                                                                                                                Narrative    MRI LUMBAR SPINE WITHOUT CONTRAST 6/10/2018 9:54 AM     HISTORY: Acute urinary retention, back pain, band of numbness and  saddle anesthesia.     TECHNIQUE: Multiplanar multisequence MRI of the lumbar spine without  contrast.    COMPARISON: None.     FINDINGS: There are five lumbar-type vertebral bodies assumed for the  purposes of this dictation. The conus terminates at the level of L1.  The distal spinal cord and cauda equina appear normal.     Alignment of the lumbar spine is normal. No loss of vertebral body  height. There are no destructive osseous lesions. Marked loss of  intervertebral disc space with disc desiccation at L5-S1. Loss of disc  height and disc desiccation also seen at L3-L4 and L4-L5. There is  edema at the opposing facets of L3 and L4 bilaterally with small  bilateral facet effusions.     Level by level as follows:     T12-L1: No significant spinal canal or neural foraminal narrowing.  Mild anterior osteophyte spurring.    L1-L2: No significant spinal canal or neural foraminal narrowing.     L2-L3: No significant spinal canal or neural foraminal narrowing.     L3-L4: Posterior disc bulge and bilateral facet hypertrophy right  greater than left results in moderate right and mild left neural  foraminal narrowing. No spinal canal narrowing. Small bilateral facet  joint effusions and mild adjacent edema in the facets as described  above.     L4-L5: Posterior disc bulge asymmetric in the left subarticular region  along with bilateral facet hypertrophy. Findings result in mild  central spinal canal narrowing and mild bilateral neural foraminal  narrowing.     L5-S1: Large central disc extrusion. There is abnormal signal  extending inferiorly from the disc likely representing extruded disc  material. This fills the spinal canal at the L5-S1 level extending  caudally throughout the S1 level. Findings completely efface the  thecal sac likely affecting the nerve roots  below the level of S2. The  left S2 nerve root is likely also impinged by the disc extrusion and  left greater than right facet hypertrophy. There is mild left neural  foraminal narrowing due to facet hypertrophy and disc extrusion. No  significant right neural foraminal narrowing.    Paraspinous soft tissues: Normal.                                                                                                                  Impression    IMPRESSION:   1. Large central disc extrusion at the L5-S1 level with disc material  filling the spinal canal and likely compressing the descending nerve  roots below the S2 level as well as likely the traversing left S1  nerve root. There is also mild left neural foraminal narrowing at this  level.  2. Additional degenerative changes at L3-L4 and L4-L5 as described  above.  3. Small bilateral facet effusions with associated surrounding edema  at the L3-L4 level.    Results discussed with Lefty Triana at 10:06 AM on 6/10/2018.    NAN VAUGHAN MD    RECENT LABS:   ECG:   ECHO:                                      Anesthesia Plan      History & Physical Review      ASA Status:  3 emergent.    NPO Status:  > 8 hours    Plan for General, RSI and ETT with Intravenous induction. Maintenance will be Balanced.           Postoperative Care      Consents                          .

## 2018-06-11 ENCOUNTER — APPOINTMENT (OUTPATIENT)
Dept: PHYSICAL THERAPY | Facility: CLINIC | Age: 53
DRG: 459 | End: 2018-06-11
Attending: PHYSICIAN ASSISTANT
Payer: COMMERCIAL

## 2018-06-11 LAB
ANION GAP SERPL CALCULATED.3IONS-SCNC: 5 MMOL/L (ref 3–14)
BUN SERPL-MCNC: 11 MG/DL (ref 7–30)
CALCIUM SERPL-MCNC: 8.5 MG/DL (ref 8.5–10.1)
CHLORIDE SERPL-SCNC: 110 MMOL/L (ref 94–109)
CO2 SERPL-SCNC: 26 MMOL/L (ref 20–32)
CREAT SERPL-MCNC: 0.63 MG/DL (ref 0.52–1.04)
ERYTHROCYTE [DISTWIDTH] IN BLOOD BY AUTOMATED COUNT: 13.2 % (ref 10–15)
GFR SERPL CREATININE-BSD FRML MDRD: >90 ML/MIN/1.7M2
GLUCOSE BLDC GLUCOMTR-MCNC: 121 MG/DL (ref 70–99)
GLUCOSE SERPL-MCNC: 99 MG/DL (ref 70–99)
HCT VFR BLD AUTO: 33.1 % (ref 35–47)
HGB BLD-MCNC: 11.2 G/DL (ref 11.7–15.7)
MCH RBC QN AUTO: 28 PG (ref 26.5–33)
MCHC RBC AUTO-ENTMCNC: 33.8 G/DL (ref 31.5–36.5)
MCV RBC AUTO: 83 FL (ref 78–100)
PLATELET # BLD AUTO: 169 10E9/L (ref 150–450)
POTASSIUM SERPL-SCNC: 3.8 MMOL/L (ref 3.4–5.3)
RBC # BLD AUTO: 4 10E12/L (ref 3.8–5.2)
SODIUM SERPL-SCNC: 141 MMOL/L (ref 133–144)
WBC # BLD AUTO: 7.7 10E9/L (ref 4–11)

## 2018-06-11 PROCEDURE — 25000132 ZZH RX MED GY IP 250 OP 250 PS 637: Performed by: INTERNAL MEDICINE

## 2018-06-11 PROCEDURE — 94640 AIRWAY INHALATION TREATMENT: CPT

## 2018-06-11 PROCEDURE — 86480 TB TEST CELL IMMUN MEASURE: CPT | Performed by: INTERNAL MEDICINE

## 2018-06-11 PROCEDURE — 36415 COLL VENOUS BLD VENIPUNCTURE: CPT | Performed by: NEUROLOGICAL SURGERY

## 2018-06-11 PROCEDURE — 80048 BASIC METABOLIC PNL TOTAL CA: CPT | Performed by: NEUROLOGICAL SURGERY

## 2018-06-11 PROCEDURE — 25000128 H RX IP 250 OP 636: Performed by: NEUROLOGICAL SURGERY

## 2018-06-11 PROCEDURE — 97530 THERAPEUTIC ACTIVITIES: CPT | Mod: GP

## 2018-06-11 PROCEDURE — 94640 AIRWAY INHALATION TREATMENT: CPT | Mod: 76

## 2018-06-11 PROCEDURE — 87536 HIV-1 QUANT&REVRSE TRNSCRPJ: CPT | Performed by: INTERNAL MEDICINE

## 2018-06-11 PROCEDURE — 86780 TREPONEMA PALLIDUM: CPT | Performed by: INTERNAL MEDICINE

## 2018-06-11 PROCEDURE — 25000132 ZZH RX MED GY IP 250 OP 250 PS 637: Performed by: NEUROLOGICAL SURGERY

## 2018-06-11 PROCEDURE — 12000007 ZZH R&B INTERMEDIATE

## 2018-06-11 PROCEDURE — 00000146 ZZHCL STATISTIC GLUCOSE BY METER IP

## 2018-06-11 PROCEDURE — 40000193 ZZH STATISTIC PT WARD VISIT

## 2018-06-11 PROCEDURE — 36415 COLL VENOUS BLD VENIPUNCTURE: CPT | Performed by: INTERNAL MEDICINE

## 2018-06-11 PROCEDURE — 25000125 ZZHC RX 250: Performed by: INTERNAL MEDICINE

## 2018-06-11 PROCEDURE — 99232 SBSQ HOSP IP/OBS MODERATE 35: CPT | Performed by: INTERNAL MEDICINE

## 2018-06-11 PROCEDURE — 85027 COMPLETE CBC AUTOMATED: CPT | Performed by: NEUROLOGICAL SURGERY

## 2018-06-11 PROCEDURE — 40000275 ZZH STATISTIC RCP TIME EA 10 MIN

## 2018-06-11 PROCEDURE — 97162 PT EVAL MOD COMPLEX 30 MIN: CPT | Mod: GP

## 2018-06-11 PROCEDURE — 86592 SYPHILIS TEST NON-TREP QUAL: CPT | Performed by: INTERNAL MEDICINE

## 2018-06-11 RX ORDER — ALBUTEROL SULFATE 0.83 MG/ML
2.5 SOLUTION RESPIRATORY (INHALATION)
Status: DISCONTINUED | OUTPATIENT
Start: 2018-06-11 | End: 2018-06-16 | Stop reason: HOSPADM

## 2018-06-11 RX ADMIN — OXYCODONE HYDROCHLORIDE 5 MG: 5 TABLET ORAL at 23:42

## 2018-06-11 RX ADMIN — ALBUTEROL SULFATE 2.5 MG: 2.5 SOLUTION RESPIRATORY (INHALATION) at 10:55

## 2018-06-11 RX ADMIN — ACETAMINOPHEN 975 MG: 325 TABLET, FILM COATED ORAL at 14:10

## 2018-06-11 RX ADMIN — ALBUTEROL SULFATE 2.5 MG: 2.5 SOLUTION RESPIRATORY (INHALATION) at 15:20

## 2018-06-11 RX ADMIN — OXYCODONE HYDROCHLORIDE 5 MG: 5 TABLET ORAL at 19:31

## 2018-06-11 RX ADMIN — CLONAZEPAM 2 MG: 1 TABLET ORAL at 08:15

## 2018-06-11 RX ADMIN — CLONAZEPAM 1 MG: 1 TABLET ORAL at 23:42

## 2018-06-11 RX ADMIN — DOLUTEGRAVIR SODIUM 50 MG: 50 TABLET, FILM COATED ORAL at 08:15

## 2018-06-11 RX ADMIN — POTASSIUM CHLORIDE AND SODIUM CHLORIDE: 900; 150 INJECTION, SOLUTION INTRAVENOUS at 17:42

## 2018-06-11 RX ADMIN — SERTRALINE HYDROCHLORIDE 100 MG: 100 TABLET ORAL at 08:15

## 2018-06-11 RX ADMIN — ACETAMINOPHEN 975 MG: 325 TABLET, FILM COATED ORAL at 23:42

## 2018-06-11 RX ADMIN — ACETAMINOPHEN 975 MG: 325 TABLET, FILM COATED ORAL at 05:39

## 2018-06-11 RX ADMIN — SENNOSIDES AND DOCUSATE SODIUM 2 TABLET: 8.6; 5 TABLET ORAL at 19:31

## 2018-06-11 RX ADMIN — EMTRICITABINE AND TENOFOVIR DISOPROXIL FUMARATE 1 TABLET: 200; 300 TABLET, FILM COATED ORAL at 08:15

## 2018-06-11 RX ADMIN — SENNOSIDES AND DOCUSATE SODIUM 2 TABLET: 8.6; 5 TABLET ORAL at 08:15

## 2018-06-11 ASSESSMENT — ACTIVITIES OF DAILY LIVING (ADL)
ADLS_ACUITY_SCORE: 9
ADLS_ACUITY_SCORE: 12
ADLS_ACUITY_SCORE: 16
ADLS_ACUITY_SCORE: 18
ADLS_ACUITY_SCORE: 12
ADLS_ACUITY_SCORE: 12

## 2018-06-11 NOTE — ANESTHESIA CARE TRANSFER NOTE
Patient: Annika Garrido    Procedure(s):  LEFT LUMBAR 5 TO SACRAL 1 DECOMPRESSIVE LAMINECTOMY; EVACUATION OF DISC HERNIATION; TRANSFORAMINAL LUMBAR INTERBODY FUSION - Wound Class: I-Clean    Diagnosis: Cauda Equinae  Diagnosis Additional Information: No value filed.    Anesthesia Type:   General, RSI, ETT     Note:  Airway :Face Mask  Patient transferred to:PACU  Comments: Transferred to PACU, spontaneous respirations, 10L oxygen via facemask.  All monitors and alarms on and functioning, VSS.  Patient awake, comfortable.  Report to PACU RN.Handoff Report: Identifed the Patient, Identified the Reponsible Provider, Reviewed the pertinent medical history, Discussed the surgical course, Reviewed Intra-OP anesthesia mangement and issues during anesthesia, Set expectations for post-procedure period and Allowed opportunity for questions and acknowledgement of understanding      Vitals: (Last set prior to Anesthesia Care Transfer)    CRNA VITALS  6/10/2018 2000 - 6/10/2018 2044      6/10/2018             Pulse: 100    SpO2: 100 %                Electronically Signed By: CHARITO Ahumada CRNA  Karina 10, 2018  8:44 PM

## 2018-06-11 NOTE — PROGRESS NOTES
[facility]    Neurosurgery  Daily Post-Op Note    Assessment & Plan   Procedure(s):  OPTICAL TRACKING SYSTEM FUSION SPINE POSTERIOR LUMBAR ONE LEVEL   -1 Day Post-Op   Cauda equina syndrome, L5-S1 TLIF 6/10/18.     Doing well.  On PCA, does c/o incisional pain. No leg pain. No n/t in LE.     Plan:  -Ambulate  -Advance diet as tolerated  -Switch to PO pain meds this afternoon.     Eze C. Kolby    Interval History   Stable.  Doing well.  Improving slowly.  Pain is reasonably controlled.  No fevers.     Physical Exam   Temp: 98.2  F (36.8  C) Temp src: Oral BP: (!) 159/103   Heart Rate: 70 Resp: 20 SpO2: 98 % O2 Device: Nasal cannula Oxygen Delivery: 1 LPM  Vitals:    06/10/18 0740   Weight: 182 lb (82.6 kg)     Vital Signs with Ranges  Temp:  [97.1  F (36.2  C)-98.3  F (36.8  C)] 98.2  F (36.8  C)  Heart Rate:  [58-89] 70  Resp:  [12-30] 20  BP: ()/() 159/103  SpO2:  [95 %-100 %] 98 %  I/O last 3 completed shifts:  In: 2475 [P.O.:295; I.V.:2180]  Out: 2115 [Urine:1475; Drains:140; Blood:500]    Alert and oriented.  Moves all extremities equally.      Incision: CDI.   SHANIQUE with 60 mL overnight.       Medications     0.9% sodium chloride + KCl 20 mEq/L 50 mL/hr at 06/11/18 0625     HYDROmorphone       sodium chloride 100 mL/hr at 06/10/18 1314        acetaminophen  975 mg Oral Q8H     albuterol  2.5 mg Nebulization 4x daily     clonazePAM (klonoPIN) tablet 1 mg  1 mg Oral At Bedtime     clonazePAM (klonoPIN) tablet 2 mg  2 mg Oral QAM     dolutegravir  50 mg Oral Daily     emtricitabine-tenofovir  1 tablet Oral Daily     QUEtiapine (SEROquel) tablet 200 mg  200 mg Oral At Bedtime     senna-docusate  1 tablet Oral BID    Or     senna-docusate  2 tablet Oral BID     sertraline (ZOLOFT) tablet 100 mg  100 mg Oral Daily     sodium chloride (PF)  3 mL Intracatheter Q8H             Eze Jarrett PA-C  [facility]

## 2018-06-11 NOTE — OP NOTE
Date of surgery: 6/10/2018  Surgeon: Jeison Quinn MD     Preoperative diagnosis: L5-S1 disc herniation, stenosis, and cauda equina syndrome  Postoperative diagnosis: L5-S1 disc herniation, stenosis, and cauda equina syndrome     Procedure:  1.  L5-S1 insertion of bilateral pedicle screws and rods  2.  L5-S1 bilateral laminectomies, right medial facetectomy, left complete facetectomy, and bilateral evacuation of disc herniation for decompression of stenosis  3.  Left L5-S1 complete facetectomy, transforaminal discectomy, and interbody arthrodesis  4.  L5-S1 insertion of Patricia Tritanium intervertebral graft with Vitoss allograft  5.  Use of O-Arm intraoperative CT Scan  6.  Use of Medtronic Stealth intraoperative neuro-navigation  7.  Use of intraoperative microscope and fluoroscopy     EBL: 200 mL     Indications: 52-year-old female with severe back and leg pain, urinary retention, saddle anesthesias, and loss of rectal tone.  MRI showed very large L5-S1 disc herniation with extensive migration, critical stenosis, and loss of disc height.  Risks, benefits, indications, and alternatives were discussed with the patient and family in detail, and they wished to proceed.      Description of surgery: The patient was positioned prone.  Sterile prepping and draping procedures were performed.  Antibiotics were administered and timeout was performed.  A midline lumbar incision was performed.  The monopolar was used to expose the spinous processes, lamina, facets, and transverse processes at L5 and S1.  The reference frame was attached, and an O-arm intra-operative CT scan was performed.  Medtronic Stealth neuro-navigation was registered.  Under navigation, bilateral pedicle screws were inserted at L5, and S1.  The [place] rongeurs was used to remove the spinous processes at L4-5.  The high speed drill was then used to perform bilateral laminectomies at L5-S1.  The Kerrison rongeurs were then used to remove the ligamentum  flavum with decompression of the nerve roots.  The thecal sac was mobilized bilaterally, and extensive extruded disc fragment was evacuated bilaterally with the pituitary rongeurs.  The drill was then used to perform a left complete facetectomy. The 15 blade was used to perform an annulotomy in the disk space at L5-S1.  A complete discectomy was performed with ras, the pituitary rongeurs, and curets.  Interbody arthrodesis was performed with ras and curets.  A [place] Tritanium intervertebral graft was inserted into the disk space at L5-S1 with Vitoss allograft.  Rods and set screws were inserted.  Antibiotic irrigation was performed.  Hemostasis was achieved.  Fluoroscopy demonstrated excellent positioning of the hardware.  The fascia was closed with 0-Vicryl sutures, and the dermal layer was closed with 2-0 vicryl sutures.  The skin was closed with staples.  There were no intraprocedural complications.

## 2018-06-11 NOTE — PROGRESS NOTES
06/11/18 1122   Quick Adds   Type of Visit Initial PT Evaluation   Living Environment   Lives With spouse;grandchild(angel)   Living Arrangements house   Home Accessibility stairs to enter home   Number of Stairs to Enter Home 4   Transportation Available car;family or friend will provide   Self-Care   Usual Activity Tolerance moderate   Current Activity Tolerance poor   Regular Exercise no   Equipment Currently Used at Home walker, rolling   Activity/Exercise/Self-Care Comment Pt mod I with FWW for ambualtion. SO assists with dressing   Functional Level Prior   Ambulation 1-->assistive equipment   Transferring 1-->assistive equipment   Toileting 1-->assistive equipment   Bathing 0-->independent   Dressing 3-->assistive equipment and person   Eating 0-->independent   Communication 0-->understands/communicates without difficulty   Cognition 1 - attention or memory deficits   Which of the above functional risks had a recent onset or change? ambulation;transferring;toileting;bathing;dressing   Prior Functional Level Comment Pt mod I with FWW. Showers IND standing. Has assist from SO for lower body dressing    General Information   Onset of Illness/Injury or Date of Surgery - Date 06/10/18   Referring Physician Eze Jarrett, DAHIANA   Pertinent History of Current Problem (include personal factors and/or comorbidities that impact the POC) Annika Garrido is a 52 year old female who was admitted on 6/10/2018 with cauda equina syndrome. S/p L5-S1 bilateral laminectomies, right medial facetectomy, left complete facetectomy, and bilateral evacuation of disc herniation for decompression of stenosis. Hx: HIV, bipolar disorder, asthma, nicotine abuse    Precautions/Limitations fall precautions;spinal precautions   Heart Disease Risk Factors Smoking;High blood pressure;Lack of physical activity;Medical history   General Observations Activity: up ad champ   Cognitive Status Examination   Cognitive Comment Pt lethargic annel  session s/p surgery. RN and MD team approved intiating evaluation. Pt able to answer yes/no questions however  provided history information    Pain Assessment   Patient Currently in Pain Yes, see Vital Sign flowsheet   Integumentary/Edema   Integumentary/Edema Comments See nursing notes for details   Posture    Posture Forward head position;Protracted shoulders   Range of Motion (ROM)   ROM Comment Decreased BLE knee extension in sitting ~15* AROM   Strength   Strength Comments Pt demo gross BLE ~3/5, requires BUE support on walker. Pt required max A x 1 for STS from EOB    Bed Mobility   Bed Mobility Comments Supoine > sitting EOB with Min A x 1 using logroll technique   Transfer Skills   Transfer Comments Pt performed STS with min A x 1 and FWW from EOB, 3 attempts    Gait   Gait Comments Unable to safely initiate, pt demo BLE weakness in standing, difficulty with marching in place   Balance   Balance Comments Requires BUE in sitting. Poor dynamic standing balance   Sensory Examination   Sensory Perception Comments INtact to light touch. Denies N/T in BLE    General Therapy Interventions   Planned Therapy Interventions balance training;bed mobility training;gait training;strengthening;transfer training;ROM;neuromuscular re-education;progressive activity/exercise   Clinical Impression   Criteria for Skilled Therapeutic Intervention yes, treatment indicated   PT Diagnosis Decreased functional mobility    Influenced by the following impairments Medical diagnosis, s/p lumbar surgery, weakness, pain, impaired balance, fatigue   Functional limitations due to impairments Decreased functional mobility   Clinical Presentation Evolving/Changing   Clinical Presentation Rationale assist for mobility   Clinical Decision Making (Complexity) Moderate complexity   Therapy Frequency` other (see comments)  (BID)   Predicted Duration of Therapy Intervention (days/wks) 4 days   Anticipated Discharge Disposition Home with Home  "Therapy;Home with Outpatient Therapy;Transitional Care Facility   Risk & Benefits of therapy have been explained Yes   Patient, Family & other staff in agreement with plan of care Yes   [facility] AM-PAC  \"6 Clicks\" V.2 Basic Mobility Inpatient Short Form   1. Turning from your back to your side while in a flat bed without using bedrails? 3 - A Little   2. Moving from lying on your back to sitting on the side of a flat bed without using bedrails? 3 - A Little   3. Moving to and from a bed to a chair (including a wheelchair)? 1 - Total   4. Standing up from a chair using your arms (e.g., wheelchair, or bedside chair)? 2 - A Lot   5. To walk in hospital room? 1 - Total   6. Climbing 3-5 steps with a railing? 1 - Total   Basic Mobility Raw Score (Score out of 24.Lower scores equate to lower levels of function) 11   Total Evaluation Time   Total Evaluation Time (Minutes) 18     "

## 2018-06-11 NOTE — PLAN OF CARE
Problem: Patient Care Overview  Goal: Plan of Care/Patient Progress Review  Discharge Planner PT      Patient plan for discharge: Home with assist and cont. therapy  Current status: Eval complete, treatment initiated. Pt's SO present throughotu session and involved. Pt lethargic throughout however agreeable to therapy. Unable to repeat spinal precautions back to therapist. Cues to maintain during session. Edu on PT role and POC. Pt isntructed on logroll for bed mobility. Min A x 1 bed mobility. STS with max A x 1 and FWW. Pt demo BLE weakness in standing and difficulty with marching in place, gross instability. Engaged in side stepping with min A x 1. Pt returned to supine with max A x 1. Pt's  can be home 24/7 if pt does DC to home   Barriers to return to prior living situation: pain, weakness, balance, stairs to enter  Recommendations for discharge: At this time rec TCU due to decreased functional mobility and level of assist required however predict pt may progress to safely DC to home, may require continued therapy, will continue to monitor and update   Rationale for recommendations: see above        Entered by: Ana Manzanares 06/11/2018 12:14 PM

## 2018-06-11 NOTE — CONSULTS
[facility]    Infectious Disease Consultation     Date of Admission:  6/10/2018  Date of Consult (When I saw the patient): 06/11/18    Assessment & Plan   Annika Garrido is a 52 year old female who was admitted on 6/10/2018.     Impression:  1. 52 y.o female with long standing history of HIV.   2. Has been on multiple medications in past followed by [facility] and most recently at the [facility] care center at [facility].   3. Current HIV regimen is Tvicay and truvada, most recent CD4 count is 140.   4. Qunat TB negative in 2017, RPR negative in a recent office visit.   5. Per patient she has missed a total of 4-5 pills last month, per  there has been more than that.   6. Presented now with: L5-S1 disc herniation, stenosis, and cauda equina syndrome, S/P:   1.  L5-S1 insertion of bilateral pedicle screws and rods  2.  L5-S1 bilateral laminectomies, right medial facetectomy, left complete facetectomy, and bilateral evacuation of disc herniation for decompression of stenosis  3.  Left L5-S1 complete facetectomy, transforaminal discectomy, and interbody arthrodesis  4.  L5-S1 insertion of [place] Tritanium intervertebral graft with Vitoss allograft    Recommendations:   Presentation perhaps unrelated to HIV bit Will get Viral Load, compliance has been an issue per . Most recent CD4 is 140.   Back on Tivicay and Truvada.   Quant TB   RPR   CMV PCR         Janki Holland MD    Reason for Consult   Reason for consult: I was asked by Dr. Persaud  to evaluate this patient for HIV patient presenting with cauda equina syndrome secondary to disc herniation and spinal stenosis.    Primary Care Physician   [facility]    Chief Complaint   Back pain     History is obtained from the patient and medical records    History of Present Illness   Annika Garrido is a 52 year old female who HIV, well controlled.  She is followed by the positive care clinic through [facility]. Current  HIV medications, Tivicay and Truvada. She presented with back pain and saddle anaesthesia.     Past Medical History   I have reviewed this patient's medical history and updated it with pertinent information if needed.   Past Medical History:   Diagnosis Date     Asymptomatic human immunodeficiency virus (HIV) infection status (H)        Past Surgical History   I have reviewed this patient's surgical history and updated it with pertinent information if needed.  History reviewed. No pertinent surgical history.    Prior to Admission Medications   Prior to Admission Medications   Prescriptions Last Dose Informant Patient Reported? Taking?   CLONAZEPAM PO 6/9/2018 at AM Self Yes Yes   Sig: Take 2 mg by mouth every morning   CLONAZEPAM PO 6/9/2018 at PM Self Yes Yes   Sig: Take 1 mg by mouth At Bedtime   HYDROcodone-acetaminophen (NORCO) 5-325 MG per tablet 6/9/2018 at Unknown time Self Yes Yes   Sig: Take 1 tablet by mouth every 8 hours as needed for severe pain   QUETIAPINE FUMARATE PO 6/9/2018 at PM Self Yes Yes   Sig: Take 200 mg by mouth At Bedtime   SERTRALINE HCL PO 6/9/2018 at Unknown time Self Yes Yes   Sig: Take 100 mg by mouth daily   albuterol (PROAIR HFA/PROVENTIL HFA/VENTOLIN HFA) 108 (90 Base) MCG/ACT Inhaler Past Week at Unknown time Self Yes Yes   Sig: Inhale 1-2 puffs into the lungs every 4 hours as needed for shortness of breath / dyspnea or wheezing   ciclopirox 8 % SOLN 6/9/2018 at Unknown time Self Yes Yes   Sig: Apply to adjacent skin and affected nails daily.  Remove with alcohol every 7 days, then repeat.   clotrimazole (LOTRIMIN) 1 % cream Past Week at Unknown time Self Yes Yes   Sig: Apply topically 2 times daily To the affected toe.   dolutegravir (TIVICAY) 50 MG tablet 6/9/2018 at Unknown time Self Yes Yes   Sig: Take 50 mg by mouth daily   emtricitabine-tenofovir (TRUVADA) 200-300 MG per tablet 6/9/2018 at Unknown time Self Yes Yes   Sig: Take 1 tablet by mouth daily   ketoconazole (NIZORAL)  2 % shampoo Past Week at Unknown time Self Yes Yes   Sig: Apply topically daily as needed for itching or irritation      Facility-Administered Medications: None     Allergies   No Known Allergies    Immunization History     There is no immunization history on file for this patient.    Social History   I have reviewed this patient's social history and updated it with pertinent information if needed. Annika Garrido  reports that she has been smoking.  She has been smoking about 0.25 packs per day. She has never used smokeless tobacco. She reports that she uses illicit drugs, including Cocaine. She reports that she does not drink alcohol.    Family History   I have reviewed this patient's family history and updated it with pertinent information if needed.   No family history on file.    Review of Systems   The 10 point Review of Systems is negative other than noted in the HPI or here.     Physical Exam   Temp: 98.2  F (36.8  C) Temp src: Oral BP: (!) 159/103   Heart Rate: 70 Resp: 20 SpO2: 100 % O2 Device: Nasal cannula Oxygen Delivery: 2 LPM  Vital Signs with Ranges  Temp:  [97.1  F (36.2  C)-98.3  F (36.8  C)] 98.2  F (36.8  C)  Heart Rate:  [58-89] 70  Resp:  [12-30] 20  BP: ()/() 159/103  SpO2:  [95 %-100 %] 100 %  182 lbs 0 oz  Body mass index is 30.29 kg/(m^2).    GENERAL APPEARANCE:  alert and no distress  EYES: Eyes grossly normal to inspection, PERRL and conjunctivae and sclerae normal  HENT: ear canals and TM's normal and nose and mouth without ulcers or lesions  NECK: no adenopathy, no asymmetry, masses, or scars and thyroid normal to palpation  RESP: lungs clear to auscultation - no rales, rhonchi or wheezes  CV: regular rates and rhythm, normal S1 S2, no S3 or S4 and no murmur, click or rub  LYMPHATICS: normal ant/post cervical and supraclavicular nodes  ABDOMEN: soft, nontender, without hepatosplenomegaly or masses and bowel sounds normal  MS: extremities normal- no gross deformities  noted  SKIN: no suspicious lesions or rashes      Data   Lab Results   Component Value Date    WBC 7.7 06/11/2018    HGB 11.2 (L) 06/11/2018    HCT 33.1 (L) 06/11/2018     06/11/2018     06/10/2018    POTASSIUM 3.5 06/10/2018    CHLORIDE 112 (H) 06/10/2018    CO2 26 06/10/2018    BUN 12 06/10/2018    CR 0.76 06/10/2018    GLC 90 06/10/2018    TROPI <0.015 10/09/2017    AST 18 06/10/2018    ALT 21 06/10/2018    ALKPHOS 100 06/10/2018    BILITOTAL 0.3 06/10/2018     No results for input(s): CULT in the last 168 hours.  No lab results found.    Invalid input(s): UC

## 2018-06-11 NOTE — PLAN OF CARE
Problem: Patient Care Overview  Goal: Plan of Care/Patient Progress Review  Outcome: No Change  A&O. Lethargic per shift. Neuros intact. Hand  strong. BLE 4/5. VSS. On Capno. Reg diet. Up with 1 and a walker.  in room. Pain at 7/10. PCA dc. Ingram dc. Due to void. Plan is to ambulate. Encourage PO pain meds. PT and OT following. Collect HIV markers. Pt  reported compliance issue.

## 2018-06-11 NOTE — PROVIDER NOTIFICATION
"Call placed to Roni FIAR: \"Would you like PT to see patient in 714 (S.P) or should nursing get her up? Please call, Lata [phone]\"    Received return call from Roni.  Ok for PT to see patient. Orders placed.  "

## 2018-06-11 NOTE — ANESTHESIA POSTPROCEDURE EVALUATION
Patient: Annika Garrido    Procedure(s):  LEFT LUMBAR 5 TO SACRAL 1 DECOMPRESSIVE LAMINECTOMY; EVACUATION OF DISC HERNIATION; TRANSFORAMINAL LUMBAR INTERBODY FUSION - Wound Class: I-Clean    Diagnosis:Cauda Equinae  Diagnosis Additional Information: No value filed.    Anesthesia Type:  General, RSI, ETT    Note:  Anesthesia Post Evaluation    Patient location during evaluation: PACU  Patient participation: Able to fully participate in evaluation  Level of consciousness: sleepy but conscious and responsive to verbal stimuli  Pain management: adequate  Airway patency: patent  Cardiovascular status: acceptable and hemodynamically stable  Respiratory status: acceptable and unassisted  Hydration status: acceptable  PONV: none     Anesthetic complications: None          Last vitals:  Vitals:    06/10/18 2115 06/10/18 2130 06/10/18 2200   BP: 113/75 124/79 117/81   Pulse:      Resp: 17 12 14   Temp: 36.4  C (97.6  F)  36.8  C (98.3  F)   SpO2: 95% 95% 96%         Electronically Signed By: Jose Lowe MD  Karina 10, 2018  10:09 PM

## 2018-06-12 ENCOUNTER — APPOINTMENT (OUTPATIENT)
Dept: PHYSICAL THERAPY | Facility: CLINIC | Age: 53
DRG: 459 | End: 2018-06-12
Payer: COMMERCIAL

## 2018-06-12 LAB
ANION GAP SERPL CALCULATED.3IONS-SCNC: 6 MMOL/L (ref 3–14)
BUN SERPL-MCNC: 12 MG/DL (ref 7–30)
CALCIUM SERPL-MCNC: 8.1 MG/DL (ref 8.5–10.1)
CHLORIDE SERPL-SCNC: 109 MMOL/L (ref 94–109)
CMV DNA SPEC NAA+PROBE-ACNC: NORMAL [IU]/ML
CMV DNA SPEC NAA+PROBE-LOG#: NORMAL {LOG_IU}/ML
CO2 SERPL-SCNC: 28 MMOL/L (ref 20–32)
CREAT SERPL-MCNC: 0.66 MG/DL (ref 0.52–1.04)
ERYTHROCYTE [DISTWIDTH] IN BLOOD BY AUTOMATED COUNT: 13.1 % (ref 10–15)
GFR SERPL CREATININE-BSD FRML MDRD: >90 ML/MIN/1.7M2
GLUCOSE BLDC GLUCOMTR-MCNC: 123 MG/DL (ref 70–99)
GLUCOSE BLDC GLUCOMTR-MCNC: 95 MG/DL (ref 70–99)
GLUCOSE SERPL-MCNC: 91 MG/DL (ref 70–99)
HCT VFR BLD AUTO: 31.1 % (ref 35–47)
HGB BLD-MCNC: 10.5 G/DL (ref 11.7–15.7)
MAGNESIUM SERPL-MCNC: 2.1 MG/DL (ref 1.6–2.3)
MCH RBC QN AUTO: 28.1 PG (ref 26.5–33)
MCHC RBC AUTO-ENTMCNC: 33.8 G/DL (ref 31.5–36.5)
MCV RBC AUTO: 83 FL (ref 78–100)
PLATELET # BLD AUTO: 158 10E9/L (ref 150–450)
POTASSIUM SERPL-SCNC: 3.6 MMOL/L (ref 3.4–5.3)
RBC # BLD AUTO: 3.74 10E12/L (ref 3.8–5.2)
RPR SER QL: NONREACTIVE
SODIUM SERPL-SCNC: 143 MMOL/L (ref 133–144)
SPECIMEN SOURCE: NORMAL
T PALLIDUM AB SER QL: REACTIVE
WBC # BLD AUTO: 6.7 10E9/L (ref 4–11)

## 2018-06-12 PROCEDURE — 12000000 ZZH R&B MED SURG/OB

## 2018-06-12 PROCEDURE — 25000132 ZZH RX MED GY IP 250 OP 250 PS 637: Performed by: INTERNAL MEDICINE

## 2018-06-12 PROCEDURE — 85027 COMPLETE CBC AUTOMATED: CPT | Performed by: INTERNAL MEDICINE

## 2018-06-12 PROCEDURE — 97530 THERAPEUTIC ACTIVITIES: CPT | Mod: GP

## 2018-06-12 PROCEDURE — 83735 ASSAY OF MAGNESIUM: CPT | Performed by: INTERNAL MEDICINE

## 2018-06-12 PROCEDURE — 40000275 ZZH STATISTIC RCP TIME EA 10 MIN

## 2018-06-12 PROCEDURE — 94640 AIRWAY INHALATION TREATMENT: CPT

## 2018-06-12 PROCEDURE — 40000193 ZZH STATISTIC PT WARD VISIT

## 2018-06-12 PROCEDURE — 99232 SBSQ HOSP IP/OBS MODERATE 35: CPT | Performed by: INTERNAL MEDICINE

## 2018-06-12 PROCEDURE — 25000132 ZZH RX MED GY IP 250 OP 250 PS 637: Performed by: NEUROLOGICAL SURGERY

## 2018-06-12 PROCEDURE — 36415 COLL VENOUS BLD VENIPUNCTURE: CPT | Performed by: INTERNAL MEDICINE

## 2018-06-12 PROCEDURE — 94640 AIRWAY INHALATION TREATMENT: CPT | Mod: 76

## 2018-06-12 PROCEDURE — 00000146 ZZHCL STATISTIC GLUCOSE BY METER IP

## 2018-06-12 PROCEDURE — 99221 1ST HOSP IP/OBS SF/LOW 40: CPT | Performed by: PSYCHIATRY & NEUROLOGY

## 2018-06-12 PROCEDURE — 25000125 ZZHC RX 250: Performed by: INTERNAL MEDICINE

## 2018-06-12 PROCEDURE — 80048 BASIC METABOLIC PNL TOTAL CA: CPT | Performed by: INTERNAL MEDICINE

## 2018-06-12 RX ORDER — AMOXICILLIN 250 MG
2 CAPSULE ORAL 2 TIMES DAILY PRN
Status: DISCONTINUED | OUTPATIENT
Start: 2018-06-12 | End: 2018-06-16 | Stop reason: HOSPADM

## 2018-06-12 RX ORDER — AMOXICILLIN 250 MG
1 CAPSULE ORAL 2 TIMES DAILY
Status: DISCONTINUED | OUTPATIENT
Start: 2018-06-12 | End: 2018-06-16 | Stop reason: HOSPADM

## 2018-06-12 RX ORDER — AMOXICILLIN 250 MG
1 CAPSULE ORAL 2 TIMES DAILY PRN
Status: DISCONTINUED | OUTPATIENT
Start: 2018-06-12 | End: 2018-06-16 | Stop reason: HOSPADM

## 2018-06-12 RX ORDER — HYDROMORPHONE HYDROCHLORIDE 1 MG/ML
0.2 INJECTION, SOLUTION INTRAMUSCULAR; INTRAVENOUS; SUBCUTANEOUS
Status: DISCONTINUED | OUTPATIENT
Start: 2018-06-12 | End: 2018-06-16 | Stop reason: HOSPADM

## 2018-06-12 RX ORDER — CLONAZEPAM 1 MG/1
1 TABLET ORAL EVERY MORNING
Status: DISCONTINUED | OUTPATIENT
Start: 2018-06-13 | End: 2018-06-16 | Stop reason: HOSPADM

## 2018-06-12 RX ORDER — BISACODYL 10 MG
10 SUPPOSITORY, RECTAL RECTAL DAILY PRN
Status: DISCONTINUED | OUTPATIENT
Start: 2018-06-12 | End: 2018-06-16 | Stop reason: HOSPADM

## 2018-06-12 RX ORDER — AMOXICILLIN 250 MG
2 CAPSULE ORAL 2 TIMES DAILY
Status: DISCONTINUED | OUTPATIENT
Start: 2018-06-12 | End: 2018-06-16 | Stop reason: HOSPADM

## 2018-06-12 RX ORDER — POLYETHYLENE GLYCOL 3350 17 G/17G
17 POWDER, FOR SOLUTION ORAL DAILY PRN
Status: DISCONTINUED | OUTPATIENT
Start: 2018-06-12 | End: 2018-06-16 | Stop reason: HOSPADM

## 2018-06-12 RX ADMIN — DOLUTEGRAVIR SODIUM 50 MG: 50 TABLET, FILM COATED ORAL at 08:08

## 2018-06-12 RX ADMIN — ALBUTEROL SULFATE 2.5 MG: 2.5 SOLUTION RESPIRATORY (INHALATION) at 07:00

## 2018-06-12 RX ADMIN — ACETAMINOPHEN 975 MG: 325 TABLET, FILM COATED ORAL at 05:40

## 2018-06-12 RX ADMIN — SENNOSIDES AND DOCUSATE SODIUM 2 TABLET: 8.6; 5 TABLET ORAL at 08:07

## 2018-06-12 RX ADMIN — OXYCODONE HYDROCHLORIDE 5 MG: 5 TABLET ORAL at 06:18

## 2018-06-12 RX ADMIN — ACETAMINOPHEN 975 MG: 325 TABLET, FILM COATED ORAL at 14:18

## 2018-06-12 RX ADMIN — ALBUTEROL SULFATE 2.5 MG: 2.5 SOLUTION RESPIRATORY (INHALATION) at 19:24

## 2018-06-12 RX ADMIN — ACETAMINOPHEN 975 MG: 325 TABLET, FILM COATED ORAL at 22:05

## 2018-06-12 RX ADMIN — EMTRICITABINE AND TENOFOVIR DISOPROXIL FUMARATE 1 TABLET: 200; 300 TABLET, FILM COATED ORAL at 08:07

## 2018-06-12 RX ADMIN — ALBUTEROL SULFATE 2.5 MG: 2.5 SOLUTION RESPIRATORY (INHALATION) at 12:10

## 2018-06-12 RX ADMIN — SERTRALINE HYDROCHLORIDE 100 MG: 100 TABLET ORAL at 08:07

## 2018-06-12 RX ADMIN — CLONAZEPAM 1 MG: 1 TABLET ORAL at 22:04

## 2018-06-12 RX ADMIN — CLONAZEPAM 2 MG: 1 TABLET ORAL at 08:07

## 2018-06-12 RX ADMIN — ALBUTEROL SULFATE 2.5 MG: 2.5 SOLUTION RESPIRATORY (INHALATION) at 15:09

## 2018-06-12 ASSESSMENT — ACTIVITIES OF DAILY LIVING (ADL)
ADLS_ACUITY_SCORE: 16
ADLS_ACUITY_SCORE: 16
ADLS_ACUITY_SCORE: 18
ADLS_ACUITY_SCORE: 17
ADLS_ACUITY_SCORE: 18
ADLS_ACUITY_SCORE: 16

## 2018-06-12 NOTE — PROGRESS NOTES
[facility]    Infectious Disease Progress Note    Date of Service (when I saw the patient): 06/12/2018     Assessment & Plan   Annika Garrido is a 52 year old female who was admitted on 6/10/2018.     Impression:  1. 52 y.o female with long standing history of HIV.   2. Has been on multiple medications in past followed by [facility] and most recently at the [facility] care center at [facility].   3. Current HIV regimen is Tvicay and truvada, most recent CD4 count is 140.   4. Qunat TB negative in 2017, RPR negative in a recent office visit.   5. Per patient she has missed a total of 4-5 pills last month, per  there has been more than that.   6. Presented now with: L5-S1 disc herniation, stenosis, and cauda equina syndrome, S/P:   1.  L5-S1 insertion of bilateral pedicle screws and rods  2.  L5-S1 bilateral laminectomies, right medial facetectomy, left complete facetectomy, and bilateral evacuation of disc herniation for decompression of stenosis  3.  Left L5-S1 complete facetectomy, transforaminal discectomy, and interbody arthrodesis  4.  L5-S1 insertion of [place] Tritanium intervertebral graft with Vitoss allograft     Recommendations:   Presentation perhaps unrelated to HIV,  Viral Load is pending, compliance has been an issue per . Most recent CD4 is 140.   Back on Tivicay and Truvada.   Quant TB pending, RPR pending, CMV PCR pending.            MD Janki Charles MD    Interval History   Labs pending   No new complaints   Drowsy     Physical Exam   Temp: (P) 98.8  F (37.1  C) Temp src: (P) Oral BP: (P) 118/76   Heart Rate: (P) 87 Resp: (P) 16 SpO2: (P) 97 % O2 Device: (P) None (Room air) Oxygen Delivery: 2 LPM  Vitals:    06/10/18 0740   Weight: 82.6 kg (182 lb)     Vital Signs with Ranges  Temp:  [97.5  F (36.4  C)-98.8  F (37.1  C)] (P) 98.8  F (37.1  C)  Heart Rate:  [65-87] (P) 87  Resp:  [16-20] (P) 16  BP: (111-154)/() (P) 118/76  SpO2:  [96 %-99 %] (P) 97  %    Constitutional: drowsy, wakes up briefly only to go back to sleep   Lungs: Clear to auscultation bilaterally, no crackles or wheezing  Cardiovascular: Regular rate and rhythm, normal S1 and S2, and no murmur noted  Abdomen: Normal bowel sounds, soft, non-distended, non-tender  Skin: No rashes, no cyanosis, no edema  Other:    Medications     0.9% sodium chloride + KCl 20 mEq/L 50 mL/hr at 06/11/18 1742     sodium chloride 100 mL/hr at 06/10/18 1314       acetaminophen  975 mg Oral Q8H     albuterol  2.5 mg Nebulization 4x daily     clonazePAM (klonoPIN) tablet 1 mg  1 mg Oral At Bedtime     clonazePAM (klonoPIN) tablet 2 mg  2 mg Oral QAM     dolutegravir  50 mg Oral Daily     emtricitabine-tenofovir  1 tablet Oral Daily     QUEtiapine (SEROquel) tablet 200 mg  200 mg Oral At Bedtime     senna-docusate  1 tablet Oral BID    Or     senna-docusate  2 tablet Oral BID     sertraline (ZOLOFT) tablet 100 mg  100 mg Oral Daily     sodium chloride (PF)  3 mL Intracatheter Q8H       Data   All microbiology laboratory data reviewed.  Recent Labs   Lab Test  06/12/18   0759  06/11/18   0757  06/10/18   1050   WBC  6.7  7.7  4.0   HGB  10.5*  11.2*  13.8   HCT  31.1*  33.1*  39.8   MCV  83  83  82   PLT  158  169  192     Recent Labs   Lab Test  06/12/18   0759  06/11/18   0757  06/10/18   1050   CR  0.66  0.63  0.76     No lab results found.  No lab results found.    Invalid input(s): ADELE

## 2018-06-12 NOTE — PROGRESS NOTES
[facility]    Neurosurgery Progress Note    Date of Service (when I saw the patient): 06/12/2018     Assessment & Plan   Annika Garrido is a 52 year old female who was admitted on 6/10/2018  with severe back and leg pain, urinary retention, saddle anesthesias, and loss of rectal tone.  MRI showed very large L5-S1 disc herniation with extensive migration, critical stenosis, and loss of disc height. Subsequently underwent L5-S1 TLIF with Dr. Jeison Quinn. Today, she is resting in bed and appears comfortable. States she is feeling relatively well and only notes some mild incisional site pain at current. Continued urinary retention and need to straight cath, will hold off on reinserting Ingram for now and consult urology.    Active Problems:    Cauda equina compression (H)    S/P lumbar fusion    Assessment: POD 2. Stable.    Plan:   -Will consult urology for continued urinary retention  -Bowel regimen  -PT/OT and ambulation  -Encourage use of IS and deep breathing   -Will have RN DC drain today  -Suture/Staple removal in 10-14 days      I have discussed the following assessment and plan with Dr. Quinn who is in agreement with initial plan and will follow up with further consultation recommendations.      Hien Tran PA-C  [facility]  [place] [address]    Tel [phone]  Pager [phone]      Interval History   Stable.     Physical Exam   Temp: (P) 98.8  F (37.1  C) Temp src: (P) Oral BP: (P) 118/76   Heart Rate: (P) 87 Resp: (P) 16 SpO2: (P) 97 % O2 Device: (P) None (Room air) Oxygen Delivery: 2 LPM  Vitals:    06/10/18 0740   Weight: 182 lb (82.6 kg)     Vital Signs with Ranges  Temp:  [97.5  F (36.4  C)-98.8  F (37.1  C)] (P) 98.8  F (37.1  C)  Heart Rate:  [65-87] (P) 87  Resp:  [16-20] (P) 16  BP: (111-154)/() (P) 118/76  SpO2:  [96 %-99 %] (P) 97 %  I/O last 3 completed shifts:  In: 1309 [P.O.:550; I.V.:759]  Out:  "2185 [Urine:2000; Drains:185]    Heart Rate: (P) 87, Blood pressure (P) 118/76, pulse 58, temperature (P) 98.8  F (37.1  C), temperature source (P) Oral, resp. rate (P) 16, height 5' 5\" (1.651 m), weight 182 lb (82.6 kg), SpO2 (P) 97 %.  182 lbs 0 oz  HEENT:  Normocephalic, atraumatic.  PERRLA.  EOM s intact.    Heart:  No peripheral edema  Lungs:  No SOB  Skin:  Warm and dry. Incision covered with dressing with staples intact CDI. SHANIQUE x1.  Extremities:  No edema, cyanosis or clubbing.    NEUROLOGICAL EXAMINATION:   Mental status:  Alert and Oriented x 3, speech is fluent.  Cranial nerves:  II-XII intact.   Motor:  Strength 4/5 to BLE.  Sensation:  intact      Medications     0.9% sodium chloride + KCl 20 mEq/L 50 mL/hr at 06/11/18 1742     sodium chloride 100 mL/hr at 06/10/18 1314       acetaminophen  975 mg Oral Q8H     albuterol  2.5 mg Nebulization 4x daily     clonazePAM (klonoPIN) tablet 1 mg  1 mg Oral At Bedtime     clonazePAM (klonoPIN) tablet 2 mg  2 mg Oral QAM     dolutegravir  50 mg Oral Daily     emtricitabine-tenofovir  1 tablet Oral Daily     QUEtiapine (SEROquel) tablet 200 mg  200 mg Oral At Bedtime     senna-docusate  1 tablet Oral BID    Or     senna-docusate  2 tablet Oral BID     sertraline (ZOLOFT) tablet 100 mg  100 mg Oral Daily     sodium chloride (PF)  3 mL Intracatheter Q8H       Data   CBC RESULTS:   Recent Labs   Lab Test  06/12/18   0759   WBC  6.7   RBC  3.74*   HGB  10.5*   HCT  31.1*   MCV  83   MCH  28.1   MCHC  33.8   RDW  13.1   PLT  158     Basic Metabolic Panel:  Lab Results   Component Value Date     06/12/2018      Lab Results   Component Value Date    POTASSIUM 3.6 06/12/2018     Lab Results   Component Value Date    CHLORIDE 109 06/12/2018     Lab Results   Component Value Date    MANOLO 8.1 06/12/2018     Lab Results   Component Value Date    CO2 28 06/12/2018     Lab Results   Component Value Date    BUN 12 06/12/2018     Lab Results   Component Value Date    CR 0.66 " 06/12/2018     Lab Results   Component Value Date    GLC 91 06/12/2018     INR:  No results found for: INR

## 2018-06-12 NOTE — CONSULTS
Pt seen for initial psychiatric evaluation, please see my dictation for details and recommendations. She says she sees a psychiatrist at [facility] and so feels her depression is already being managed and so she does not want her medication doses adjusted.

## 2018-06-12 NOTE — CONSULTS
"Consult Date:  06/12/2018      PSYCHIATRIC CONSULTATION      DATE OF ADMISSION:  06/10/2018.      DATE OF SERVICE:  06/12/2018.      REASON FOR CONSULTATION:  Depression screen, also altered mental status.  Please evaluate drugs.      REQUESTING PHYSICIAN:  Jasper Persaud MD.      IDENTIFYING DATA:  Annika Garrido is a 52-year-old woman who reports she has a significant other with whom she resides.  They have been together for 16 years.  She states she has 4 children and is currently supported on SSDI.  She has a history of being positive for HIV.  She tells me she received psychiatric care through [facility] in [place].  She presented to [facility] ED on 06/10/2018 on account of numbness in her thighs and back pain.  She was recently discharged from [facility] with a diagnosis of sciatica, following which she was discharged home.  She started to notice that both of her thighs were becoming numb and she has been having trouble emptying her bladder.  She has since been assessed as having cauda equina syndrome as MRI in the Emergency Department demonstrated a large central disc extrusion at the L5 to S1 level compressing the nerve roots below the S2 level and the left S1 nerve root.  Psychiatry was consulted to help render an opinion on her mood as she has a history of bipolar disorder.      CHIEF COMPLAINT:  \"I have had a series of losses lately.\"      HISTORY OF PRESENT ILLNESS:  Annika Garrido reportedly noticed low back pain about a week ago.  The pain reportedly got worse with movement or sitting up in bed.  She presented at [facility] on 06/08 with complaints of low back pain, but x-rays done at the time did not demonstrate any evidence for fracture, and the patient was discharged home.  The next day the patient noted numbness while trying to urinate, and the numbness reportedly extended down her legs.  She had no associated " weakness.  However, her partner noted some gait instability.  She has since been evaluated by Neurosurgery and assessed as having cauda equina compression as she was found to have very large L5 to S1 disk herniation with critical lumbar stenosis with symptomatic urinary retention, saddle anesthesias and decreased rectal tone consistent with cauda equina syndrome.  Recommendation was for the patient to undergo surgery, and she will require complete facetectomy and surgical stabilization.  The patient also has a history of HIV infection that had been managed in the past at the [facility] and most recently at [facility] [place].  She is currently on the regimen of Tivicay and Truvada with most recent CD4 count of 140.      With respect to her depression, the patient reports that she sees providers at [facility] and is currently on a combination of Seroquel, clonazepam and Zoloft.  She reports that her depression had gotten worse in the past year on account of multiple losses of family members.  She tells me she lost a brother 2 years ago, a sister-in-law in 11/2017 and a sister in 12/2017.  She describes that she is normally a very energetic person, but lately she has been listless, depressed and amotivated.  She states she does not feel she needs any medication adjustment, but staff on the neuroscience unit reports that she has been very somnolent, which may be as a result of the combination of her psychotropic medications, benzodiazepines and narcotics.  She does not endorse current symptoms suggestive of remi or psychosis.  She denies anxiety spectrum symptoms.  She does not endorse recent use of alcohol or illicit chemicals, even though her urine toxicology screen was positive for cocaine as of 06/10/2018.      PAST PSYCHIATRIC HISTORY:  The patient reports she was admitted for management of her mood in the past, but she was not able to give any specifics in terms of previous  psychiatric hospitalization.      CHEMICAL USE HISTORY:  The patient smokes 1/4 of a pack of cigarettes per day and has a history of abusing cocaine.  She denies use of alcohol.      PAST MEDICAL HISTORY:  Positive for asymptomatic human immunodeficiency virus infection.      MEDICATIONS PRIOR TO ADMISSION:   1.  Ventolin 1-2 puffs q.4 hours p.r.n. shortness of breath.   2.  Ciclopirox 8% solution, apply to adjacent skin and affected nails daily.   3.  Clonazepam 2 mg p.o. q.a.m.   4.  Clonazepam 1 mg p.o. at bedtime.   5.  Lotrimin 1% b.i.d.     6.  Tivicay 50 mg p.o. daily.   7.  Truvada 20/300 one p.o. daily.   8.  Norco 5/325 one p.o. q.8 hours p.r.n.   9.  Nizoral 2% shampoo topically daily as needed for itching or irritation.     10.  Seroquel 200 mg p.o. at bedtime.   11.  Zoloft 100 mg p.o. daily.      ALLERGIES:  NO KNOWN DRUG ALLERGIES.      FAMILY PSYCHIATRIC HISTORY:  None reported.      SOCIAL HISTORY:  The patient reports she grew up in [place] in [place].  She states she is the youngest of her parents' 7 children.  She has lost 3 siblings.  She has never been , has 4 adult children.  She is currently on SSDI.  She denies  or criminal history.      REVIEW OF SYSTEMS:  I refer the reader to the 10-point review of systems documented by Jasper Persaud MD, on 06/10/2018 at 10:59 a.m.      VITAL SIGNS:  Blood pressure 125/84, pulse 90, respirations 16, temperature 98.6, weight 182 pounds.      MENTAL STATUS EXAMINATION:  This is a 52-year-old woman who appears older than her stated age.  She is seen at her bedside where she is rather difficult to arouse and quite somnolent.  She fades in and out of sleep and is difficult to engage.  Her mood is depressed and her affect is flat and restricted in range.  Her thought process was difficult to assess, but for the most part appears logical.  There is no evidence of psychosis where she does appear internally preoccupied.  It is difficult to  assess her cognition on account of her level of sedation, and so this will be deferred at this time.  Risk assessment at this time is considered low and she reports future orientation.      DIAGNOSTIC IMPRESSION:  Annika Trevizo is a 52-year-old woman with history of human immunodeficiency virus infection who was admitted on account of bilateral numbness in her thighs and difficulty urinating associated with blunt trauma a week prior.  She has been found to have cauda equina syndrome for which she underwent L5 to S1 TLIF with Dr. Jeison Quinn and is stable in her second postoperative day.  She was being evaluated for depression today and noted to be significantly somnolent.      DIAGNOSES:   1.  Major depressive disorder, recurrent, moderate.   2.  Cauda equina compression status post lumbar fusion.   3.  Human immunodeficiency virus infection.      PLAN:   1.  Medical management as you are.   2.  The patient reportedly has a history of bipolar disorder and is currently on psychotropic medications.  At this time, she is overly sedated on her narcotics and benzodiazepines.  She does not want Psychiatry to introduce any new medications, but given her degree of somnolence, it is prudent to reduce her benzodiazepine exposure in the context of her current narcotic use.  I will, therefore, adjust her clonazepam dose to 1 mg b.i.d. while her other medications are maintained as prescribed by her outpatient providers.  Psychiatry may be contacted as needed for followup.      Thanks for the consult.         MICHAEL ELDER MD             D: 2018   T: 2018   MT: IZAIAH      Name:     ANNIKA TREVIZO   MRN:      [id number]        Account:       [id number]   :      1965           Consult Date:  2018      Document: [id number]       cc: [facility]        Jasper Persaud MD

## 2018-06-12 NOTE — PLAN OF CARE
Problem: Patient Care Overview  Goal: Plan of Care/Patient Progress Review  Outcome: No Change  Pt lethargic during shift. VSS on RA. C/O 10/10 pain during intermittent sleep. CMS intact, strength 4/5 in BLE. Dressing CDI ex drainage that is marked and unchanged. DTV, straight cathed for 900 mL. Continue to monitor.

## 2018-06-12 NOTE — PROGRESS NOTES
CM    I:  SW was updated [facility] is following patient for possible admission.  ROMERO officially placed ARU referral thru DOD.    P: Continue to assist as needed.    NABILA Roberson
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
## 2018-06-12 NOTE — PLAN OF CARE
Problem: Patient Care Overview  Goal: Plan of Care/Patient Progress Review  Outcome: Improving  Strength 5/5 all extremities, denies N/T.  Rates pain 8/10, decreased with repositioning and ice packs, trying to limit narcotics as much as possible and just use Tylenol.  Ingram placed on day shift per urology orders, adequate output.  Large BM this shift.  Up with assist of 1-2, GB, walker to BSC.  Lethargic at beginning of shift, appears more awake as shift progresses.  ARU following.

## 2018-06-12 NOTE — PLAN OF CARE
Problem: Patient Care Overview  Goal: Plan of Care/Patient Progress Review  Discharge Planner PT   Patient plan for discharge: Not stated  Current status: Pt continues to be very lethargic however stating she wants to attempt sitting up. Pt is mod-max a X 1 for bed mobility. STS x 1 with FWW and mod A x 1. Pt stood < 30 seconds. Difficulty keeping eyes open. Returned to supine with max assist. Pt limtied in ability to successfully participate in therapy due to somnolence   Barriers to return to prior living situation: Lethargic, weakness, assist for mobility, unable to safely initiate gait   Recommendations for discharge: Rehab  Rationale for recommendations: ARU vs TCU pending improved tolerance to therapy. Will require rehab stay to improve IND with functional mobility         Entered by: Ana Manzanares 06/12/2018 12:22 PM

## 2018-06-12 NOTE — PLAN OF CARE
"Problem: Patient Care Overview  Goal: Plan of Care/Patient Progress Review  Outcome: No Change  Strength 4/5 BLE, denies N/T.  Very lethargic, falls asleep during assessment and needs repeated cues to answer orientation questions.  States pain is \"tolerable.\" Using ice packs and repositioning for pain control due to lethargy.  Up with assist of 2 and GB to pivot to chair.  DTV, bladder scanned for 72cc.  J/P patent.  Marked drainage on distal part of dressing, unchanged from day shift.  Discharge plan pending.      "

## 2018-06-12 NOTE — PLAN OF CARE
Problem: Patient Care Overview  Goal: Plan of Care/Patient Progress Review  Outcome: No Change  Oriented x4. Lethargic. Forgetful and impulsive at times. CMS intact. No complaints of numbness and tingling. Strength 5/5. Dorsi plantar 4/5. VSS. Reg diet. Up with one. Pt gait wobbly. Denies pain. Plan is hold on opioids. Ingram reinitiated. Drain removed. Bed alarms on. PT and OT consulted along with psych. Viral load pending.

## 2018-06-12 NOTE — PLAN OF CARE
Problem: Patient Care Overview  Goal: Plan of Care/Patient Progress Review  Outcome: No Change  7-11pm: Pt intermittently lethargic this evening, oriented x4. CMS intact, strength 4/5 in BLE. Bowel sounds active, passing gas. BP elevated, other VSS. Weaned to RA. Dressing marked with dried drainage. SHANIQUE with 70 ml output on eves. Pt unable to void, straight cathed 500ml at 2045. Up with 1-2 and GB to BSC. C/o 10/10 pain at started of shift, received 5mg oxycodone and pt became lethargic. Will continue to monitor.

## 2018-06-12 NOTE — PROGRESS NOTES
[facility]    Hospitalist Progress Note    Date of Service (when I saw the patient): 06/12/2018    Assessment & Plan   Annika Garrido is a 52 year old female who was admitted on 6/10/2018 with cauda equina syndrome.    1.  Cauda equina syndrome due to L5-S1 disc herniation, s/p decompression on 6/8.  Post op cares per Neurosurgery.  Pain control with acetaminophen, oxycodone, and dilaudid PCA.    2.  HIV positive status.  Patient reports that her HIV is well controlled, but  told ID that she is noncompliant with meds.  She is followed by the positive care clinic through [facility].  Will continue her current HIV medications, Tivicay and Truvada.  ID has ordered viral load, Quant TB, RPR, CMV PCR.     3.  Bipolar disorder.  Continue Klonopin, Seroquel and Zoloft.  Psychiatry to evaluate meds.     4.  Asthma.  Continue albuterol inhaler as needed.  Postoperatively patient will be started on albuterol 4 times daily to prevent exacerbation.     5.  Nicotine abuse.  Will write for nicotine lozenge as needed.  Smoking cessation was encouraged.    6.  Toxic metabolic encephalopathy.  Much more sleepy today.  Will repeat urine tox screen.  Will reduce oxycodone to 2.5-5 mg orally every 4 hours and dilaudid to 0.2 mg IV q 2 hrs prn.    7.  Positive depression screen.  Consult Psychiatry to evaluate.  Will also ask Psychiatry to evaluate medications.    # Pain Assessment:  Current Pain Score 6/12/2018   Patient currently in pain? sleeping: patient not able to self report   Pain score (0-10) -   Pain location -   Pain descriptors -   - Annika is experiencing pain due to surgery. Pain management was discussed and the plan was created in a collaborative fashion.  Annika's response to the current recommendations: compliant  - Please see the plan for pain management as documented above          DVT Prophylaxis: Pneumatic Compression Devices  Code Status: Full Code    Disposition:  Expected discharge pending post op course.    Jasper Persaud  Text Page (7 am to 6 pm)    Interval History   The patient is sitting in bed.  She is very sleepy and confused, mumbling answers to questions.    -Data reviewed today: I reviewed all new labs and imaging results over the last 24 hours. I personally reviewed no images or EKG's today.    Physical Exam   Temp: (P) 98.8  F (37.1  C) Temp src: (P) Oral BP: (P) 118/76   Heart Rate: (P) 87 Resp: (P) 16 SpO2: (P) 97 % O2 Device: (P) None (Room air) Oxygen Delivery: 2 LPM  Vitals:    06/10/18 0740   Weight: 82.6 kg (182 lb)     Vital Signs with Ranges  Temp:  [97.5  F (36.4  C)-98.8  F (37.1  C)] (P) 98.8  F (37.1  C)  Heart Rate:  [65-87] (P) 87  Resp:  [16-20] (P) 16  BP: (111-154)/() (P) 118/76  SpO2:  [96 %-99 %] (P) 97 %  I/O last 3 completed shifts:  In: 1309 [P.O.:550; I.V.:759]  Out: 2185 [Urine:2000; Drains:185]    Gen: Well nourished, well developed, somnolent but awakens, no acute distressed  HEENT: Atraumatic, normocephalic  Lungs: Clear to ausculation without wheezes, rhonchi, or rales  Heart: Regular rate and rhythm, no murmurs, gallops, or rubs  GI: Bowel sound normal, no hepatosplenomegaly or masses  Lymph: No lymphadenopathy or edema  Skin: No rashes     Medications     0.9% sodium chloride + KCl 20 mEq/L 50 mL/hr at 06/11/18 1742     sodium chloride 100 mL/hr at 06/10/18 1314       acetaminophen  975 mg Oral Q8H     albuterol  2.5 mg Nebulization 4x daily     clonazePAM (klonoPIN) tablet 1 mg  1 mg Oral At Bedtime     clonazePAM (klonoPIN) tablet 2 mg  2 mg Oral QAM     dolutegravir  50 mg Oral Daily     emtricitabine-tenofovir  1 tablet Oral Daily     QUEtiapine (SEROquel) tablet 200 mg  200 mg Oral At Bedtime     senna-docusate  1 tablet Oral BID    Or     senna-docusate  2 tablet Oral BID     sertraline (ZOLOFT) tablet 100 mg  100 mg Oral Daily     sodium chloride (PF)  3 mL Intracatheter Q8H       Data     Recent Labs  Lab  06/12/18  0759 06/11/18  0757 06/10/18  1050   WBC 6.7 7.7 4.0   HGB 10.5* 11.2* 13.8   MCV 83 83 82    169 192    141 143   POTASSIUM 3.6 3.8 3.5   CHLORIDE 109 110* 112*   CO2 28 26 26   BUN 12 11 12   CR 0.66 0.63 0.76   ANIONGAP 6 5 5   MANOLO 8.1* 8.5 9.0   GLC 91 99 90   ALBUMIN  --   --  3.7   PROTTOTAL  --   --  8.3   BILITOTAL  --   --  0.3   ALKPHOS  --   --  100   ALT  --   --  21   AST  --   --  18       No results found for this or any previous visit (from the past 24 hour(s)).

## 2018-06-13 ENCOUNTER — APPOINTMENT (OUTPATIENT)
Dept: GENERAL RADIOLOGY | Facility: CLINIC | Age: 53
DRG: 459 | End: 2018-06-13
Attending: INTERNAL MEDICINE
Payer: COMMERCIAL

## 2018-06-13 ENCOUNTER — HOSPITAL ENCOUNTER (INPATIENT)
Facility: CLINIC | Age: 53
End: 2018-06-13
Payer: COMMERCIAL

## 2018-06-13 ENCOUNTER — APPOINTMENT (OUTPATIENT)
Dept: PHYSICAL THERAPY | Facility: CLINIC | Age: 53
DRG: 459 | End: 2018-06-13
Payer: COMMERCIAL

## 2018-06-13 LAB
ALBUMIN UR-MCNC: 10 MG/DL
AMPHETAMINES UR QL SCN: NEGATIVE
ANION GAP SERPL CALCULATED.3IONS-SCNC: 7 MMOL/L (ref 3–14)
APPEARANCE UR: CLEAR
BARBITURATES UR QL: NEGATIVE
BASOPHILS # BLD AUTO: 0 10E9/L (ref 0–0.2)
BASOPHILS NFR BLD AUTO: 0.2 %
BENZODIAZ UR QL: NEGATIVE
BILIRUB UR QL STRIP: NEGATIVE
BUN SERPL-MCNC: 14 MG/DL (ref 7–30)
CALCIUM SERPL-MCNC: 8.4 MG/DL (ref 8.5–10.1)
CANNABINOIDS UR QL SCN: NEGATIVE
CHLORIDE SERPL-SCNC: 106 MMOL/L (ref 94–109)
CO2 SERPL-SCNC: 27 MMOL/L (ref 20–32)
COCAINE UR QL: POSITIVE
COLOR UR AUTO: YELLOW
CREAT SERPL-MCNC: 0.67 MG/DL (ref 0.52–1.04)
DIFFERENTIAL METHOD BLD: ABNORMAL
EOSINOPHIL # BLD AUTO: 0 10E9/L (ref 0–0.7)
EOSINOPHIL NFR BLD AUTO: 0.2 %
ERYTHROCYTE [DISTWIDTH] IN BLOOD BY AUTOMATED COUNT: 13 % (ref 10–15)
GAMMA INTERFERON BACKGROUND BLD IA-ACNC: 0.04 IU/ML
GFR SERPL CREATININE-BSD FRML MDRD: >90 ML/MIN/1.7M2
GLUCOSE SERPL-MCNC: 100 MG/DL (ref 70–99)
GLUCOSE UR STRIP-MCNC: NEGATIVE MG/DL
HCT VFR BLD AUTO: 30.5 % (ref 35–47)
HGB BLD-MCNC: 10.4 G/DL (ref 11.7–15.7)
HGB UR QL STRIP: NEGATIVE
HIV1 RNA # PLAS NAA DL=20: NORMAL {COPIES}/ML
HIV1 RNA SERPL NAA+PROBE-LOG#: NORMAL {LOG_COPIES}/ML
IMM GRANULOCYTES # BLD: 0 10E9/L (ref 0–0.4)
IMM GRANULOCYTES NFR BLD: 0.3 %
KETONES UR STRIP-MCNC: NEGATIVE MG/DL
LACTATE BLD-SCNC: 0.5 MMOL/L (ref 0.4–1.9)
LEUKOCYTE ESTERASE UR QL STRIP: ABNORMAL
LYMPHOCYTES # BLD AUTO: 1.5 10E9/L (ref 0.8–5.3)
LYMPHOCYTES NFR BLD AUTO: 17.7 %
M TB IFN-G BLD-IMP: NEGATIVE
M TB IFN-G CD4+ BCKGRND COR BLD-ACNC: 1.65 IU/ML
MAGNESIUM SERPL-MCNC: 2 MG/DL (ref 1.6–2.3)
MCH RBC QN AUTO: 28.4 PG (ref 26.5–33)
MCHC RBC AUTO-ENTMCNC: 34.1 G/DL (ref 31.5–36.5)
MCV RBC AUTO: 83 FL (ref 78–100)
MITOGEN IGNF BCKGRD COR BLD-ACNC: 0 IU/ML
MITOGEN IGNF BCKGRD COR BLD-ACNC: NEGATIVE IU/ML
MONOCYTES # BLD AUTO: 1.3 10E9/L (ref 0–1.3)
MONOCYTES NFR BLD AUTO: 14.6 %
MUCOUS THREADS #/AREA URNS LPF: PRESENT /LPF
NEUTROPHILS # BLD AUTO: 5.8 10E9/L (ref 1.6–8.3)
NEUTROPHILS NFR BLD AUTO: 67 %
NITRATE UR QL: NEGATIVE
NRBC # BLD AUTO: 0 10*3/UL
NRBC BLD AUTO-RTO: 0 /100
OPIATES UR QL SCN: NEGATIVE
PCP UR QL SCN: NEGATIVE
PH UR STRIP: 6 PH (ref 5–7)
PLATELET # BLD AUTO: 155 10E9/L (ref 150–450)
POTASSIUM SERPL-SCNC: 3.3 MMOL/L (ref 3.4–5.3)
POTASSIUM SERPL-SCNC: 4.1 MMOL/L (ref 3.4–5.3)
RBC # BLD AUTO: 3.66 10E12/L (ref 3.8–5.2)
RBC #/AREA URNS AUTO: 4 /HPF (ref 0–2)
SODIUM SERPL-SCNC: 140 MMOL/L (ref 133–144)
SOURCE: ABNORMAL
SP GR UR STRIP: 1.03 (ref 1–1.03)
SQUAMOUS #/AREA URNS AUTO: <1 /HPF (ref 0–1)
UROBILINOGEN UR STRIP-MCNC: >12 MG/DL (ref 0–2)
WBC # BLD AUTO: 8.7 10E9/L (ref 4–11)
WBC #/AREA URNS AUTO: 5 /HPF (ref 0–5)

## 2018-06-13 PROCEDURE — 83605 ASSAY OF LACTIC ACID: CPT | Performed by: INTERNAL MEDICINE

## 2018-06-13 PROCEDURE — 40000193 ZZH STATISTIC PT WARD VISIT: Performed by: PHYSICAL THERAPIST

## 2018-06-13 PROCEDURE — 12000000 ZZH R&B MED SURG/OB

## 2018-06-13 PROCEDURE — 83735 ASSAY OF MAGNESIUM: CPT | Performed by: INTERNAL MEDICINE

## 2018-06-13 PROCEDURE — 94640 AIRWAY INHALATION TREATMENT: CPT | Mod: 76

## 2018-06-13 PROCEDURE — 80048 BASIC METABOLIC PNL TOTAL CA: CPT | Performed by: INTERNAL MEDICINE

## 2018-06-13 PROCEDURE — 36415 COLL VENOUS BLD VENIPUNCTURE: CPT | Performed by: INTERNAL MEDICINE

## 2018-06-13 PROCEDURE — 97530 THERAPEUTIC ACTIVITIES: CPT | Mod: GP | Performed by: PHYSICAL THERAPIST

## 2018-06-13 PROCEDURE — 99232 SBSQ HOSP IP/OBS MODERATE 35: CPT | Performed by: PSYCHIATRY & NEUROLOGY

## 2018-06-13 PROCEDURE — 81001 URINALYSIS AUTO W/SCOPE: CPT | Performed by: INTERNAL MEDICINE

## 2018-06-13 PROCEDURE — 71045 X-RAY EXAM CHEST 1 VIEW: CPT

## 2018-06-13 PROCEDURE — 87040 BLOOD CULTURE FOR BACTERIA: CPT | Performed by: INTERNAL MEDICINE

## 2018-06-13 PROCEDURE — 25000132 ZZH RX MED GY IP 250 OP 250 PS 637: Performed by: PSYCHIATRY & NEUROLOGY

## 2018-06-13 PROCEDURE — 40000275 ZZH STATISTIC RCP TIME EA 10 MIN

## 2018-06-13 PROCEDURE — 94640 AIRWAY INHALATION TREATMENT: CPT

## 2018-06-13 PROCEDURE — 25000132 ZZH RX MED GY IP 250 OP 250 PS 637: Performed by: NEUROLOGICAL SURGERY

## 2018-06-13 PROCEDURE — 80307 DRUG TEST PRSMV CHEM ANLYZR: CPT | Performed by: INTERNAL MEDICINE

## 2018-06-13 PROCEDURE — 25000132 ZZH RX MED GY IP 250 OP 250 PS 637: Performed by: INTERNAL MEDICINE

## 2018-06-13 PROCEDURE — 99232 SBSQ HOSP IP/OBS MODERATE 35: CPT | Performed by: INTERNAL MEDICINE

## 2018-06-13 PROCEDURE — 85025 COMPLETE CBC W/AUTO DIFF WBC: CPT | Performed by: INTERNAL MEDICINE

## 2018-06-13 PROCEDURE — 84132 ASSAY OF SERUM POTASSIUM: CPT | Performed by: INTERNAL MEDICINE

## 2018-06-13 PROCEDURE — 87086 URINE CULTURE/COLONY COUNT: CPT | Performed by: INTERNAL MEDICINE

## 2018-06-13 PROCEDURE — 25000128 H RX IP 250 OP 636: Performed by: NEUROLOGICAL SURGERY

## 2018-06-13 PROCEDURE — 99207 ZZC CDG-MDM COMPONENT: MEETS LOW - DOWN CODED: CPT | Performed by: INTERNAL MEDICINE

## 2018-06-13 PROCEDURE — 25000125 ZZHC RX 250: Performed by: INTERNAL MEDICINE

## 2018-06-13 RX ADMIN — Medication 2.5 MG: at 15:22

## 2018-06-13 RX ADMIN — Medication 2.5 MG: at 00:18

## 2018-06-13 RX ADMIN — ALBUTEROL SULFATE 2.5 MG: 2.5 SOLUTION RESPIRATORY (INHALATION) at 08:16

## 2018-06-13 RX ADMIN — ALBUTEROL SULFATE 2.5 MG: 2.5 SOLUTION RESPIRATORY (INHALATION) at 15:56

## 2018-06-13 RX ADMIN — CLONAZEPAM 1 MG: 1 TABLET ORAL at 23:01

## 2018-06-13 RX ADMIN — Medication 2.5 MG: at 09:55

## 2018-06-13 RX ADMIN — DOLUTEGRAVIR SODIUM 50 MG: 50 TABLET, FILM COATED ORAL at 09:54

## 2018-06-13 RX ADMIN — POTASSIUM CHLORIDE 20 MEQ: 1500 TABLET, EXTENDED RELEASE ORAL at 18:14

## 2018-06-13 RX ADMIN — ALBUTEROL SULFATE 2.5 MG: 2.5 SOLUTION RESPIRATORY (INHALATION) at 20:01

## 2018-06-13 RX ADMIN — POTASSIUM CHLORIDE AND SODIUM CHLORIDE: 900; 150 INJECTION, SOLUTION INTRAVENOUS at 17:10

## 2018-06-13 RX ADMIN — POTASSIUM CHLORIDE AND SODIUM CHLORIDE: 900; 150 INJECTION, SOLUTION INTRAVENOUS at 09:53

## 2018-06-13 RX ADMIN — POTASSIUM CHLORIDE 40 MEQ: 1500 TABLET, EXTENDED RELEASE ORAL at 16:12

## 2018-06-13 RX ADMIN — CLONAZEPAM 1 MG: 1 TABLET ORAL at 09:54

## 2018-06-13 RX ADMIN — Medication 2.5 MG: at 04:07

## 2018-06-13 RX ADMIN — ACETAMINOPHEN 975 MG: 325 TABLET, FILM COATED ORAL at 15:22

## 2018-06-13 RX ADMIN — EMTRICITABINE AND TENOFOVIR DISOPROXIL FUMARATE 1 TABLET: 200; 300 TABLET, FILM COATED ORAL at 09:54

## 2018-06-13 RX ADMIN — ALBUTEROL SULFATE 2.5 MG: 2.5 SOLUTION RESPIRATORY (INHALATION) at 11:52

## 2018-06-13 RX ADMIN — ACETAMINOPHEN 975 MG: 325 TABLET, FILM COATED ORAL at 05:50

## 2018-06-13 RX ADMIN — SERTRALINE HYDROCHLORIDE 100 MG: 100 TABLET ORAL at 09:54

## 2018-06-13 ASSESSMENT — ACTIVITIES OF DAILY LIVING (ADL)
ADLS_ACUITY_SCORE: 16
ADLS_ACUITY_SCORE: 18
ADLS_ACUITY_SCORE: 16
ADLS_ACUITY_SCORE: 17
ADLS_ACUITY_SCORE: 17
ADLS_ACUITY_SCORE: 16

## 2018-06-13 NOTE — PROGRESS NOTES
Care Coordinator spoke with Jonnathan (pt's [facility] Care Coordinator,[phone], fax [phone]) who called  requesting for update.  Per Jonnathan, pt has bipolar disorder and is being followed by a psychiatrist.  Jonnathan states that pt lives in a public housing with grandchildren and  receives 2 1/2 hours of PCA everyday;  her PCA is Denzel (who was listed in the contact list as pt's spouse).  Jonnathan also states that pt at times will not go to her follow-up appointments.  Jonnathan was informeand that therapies are recommending pt to be discharged to a rehab facility. Jonnathan requests for [place] papers to be faxed to her at [facility]. Kylah JASSO updated.

## 2018-06-13 NOTE — PROGRESS NOTES
CM    I: SW spoke with [facility] liaison regarding patient placement.  Physical therapy is now recommending TCU unless activity tolerance improves, then ARU.  [facility] and SW will continue to follow.  SW may need to obtain TCU choices from patient if applicable.    P:  Continue to assist as needed.    NABILA Roberson

## 2018-06-13 NOTE — PROGRESS NOTES
[facility]    Hospitalist Progress Note    Date of Service (when I saw the patient): 06/13/2018    Assessment & Plan   Annika Garrido is a 52 year old female who was admitted on 6/10/2018 with cauda equina syndrome.    1.  Cauda equina syndrome due to L5-S1 disc herniation, s/p decompression on 6/8.  Post op cares per Neurosurgery.  Pain control with acetaminophen and oxycodone..    2.  HIV positive status.  Patient reports that her HIV is well controlled, but  told ID that she is noncompliant with meds.  She is followed by the positive care clinic through [facility].  Will continue her current HIV medications, Tivicay and Truvada.  ID has ordered viral load (undetectable), Quant TB pending, RPR negative, CMV PCR negative.     3.  Bipolar disorder.  Continue Seroquel and Zoloft.  Psychiatry reduced dose of Kloopin.     4.  Asthma.  Continue albuterol inhaler as needed.  Postoperatively patient will be started on albuterol 4 times daily to prevent exacerbation.     5.  Nicotine abuse.  Will write for nicotine lozenge as needed.  Smoking cessation was encouraged.    6.  Toxic metabolic encephalopathy.  Less sleepy today.  Urine tox screen unchanged from admission (pos for cocaine)  Will reduce oxycodone to 2.5-5 mg orally every 4 hours and dilaudid to 0.2 mg IV q 2 hrs prn.    7.  Positive depression screen.  Consult Psychiatry to evaluate.      8.  Fevers.  UA/UC, blood cultures x 2, CXR ordered.  Will ask NS to evaluate surgical site.  Will ask ID to comment and make any further recommendations.    # Pain Assessment:  Current Pain Score 6/13/2018   Patient currently in pain? -   Pain score (0-10) 7   Pain location -   Pain descriptors -   - Annika is experiencing pain due to surgery. Pain management was discussed and the plan was created in a collaborative fashion.  Annika's response to the current recommendations: compliant  - Please see the plan for pain management as  documented above          DVT Prophylaxis: Pneumatic Compression Devices  Code Status: Full Code    Disposition: Expected discharge pending post op course.    Jasper Thiago Cori  Text Page (7 am to 6 pm)    Interval History   The patient is resting in bed.  She is awake and answers questions appropriately.  No acute complaints.  Pain is controlled.    -Data reviewed today: I reviewed all new labs and imaging results over the last 24 hours. I personally reviewed no images or EKG's today.    Physical Exam   Temp: 99.2  F (37.3  C) Temp src: Oral BP: (!) 86/55 Pulse: 101 Heart Rate: 104 Resp: 18 SpO2: 99 % O2 Device: None (Room air)    Vitals:    06/10/18 0740   Weight: 82.6 kg (182 lb)     Vital Signs with Ranges  Temp:  [98.6  F (37  C)-102.3  F (39.1  C)] 99.2  F (37.3  C)  Pulse:  [] 101  Heart Rate:  [] 104  Resp:  [16-20] 18  BP: ()/(55-87) 86/55  SpO2:  [94 %-99 %] 99 %  I/O last 3 completed shifts:  In: 100 [P.O.:100]  Out: 1575 [Urine:1575]    Gen: Well nourished, well developed, alert and oriented, no acute distressed  HEENT: Atraumatic, normocephalic  Lungs: Clear to ausculation without wheezes, rhonchi, or rales  Heart: Regular rate and rhythm, no murmurs, gallops, or rubs  GI: Bowel sound normal, no hepatosplenomegaly or masses  Lymph: No lymphadenopathy or edema  Skin: No rashes     Medications     0.9% sodium chloride + KCl 20 mEq/L 50 mL/hr at 06/13/18 0953       acetaminophen  975 mg Oral Q8H     albuterol  2.5 mg Nebulization 4x daily     clonazePAM (klonoPIN) tablet 1 mg  1 mg Oral QAM     clonazePAM (klonoPIN) tablet 1 mg  1 mg Oral At Bedtime     dolutegravir  50 mg Oral Daily     emtricitabine-tenofovir  1 tablet Oral Daily     QUEtiapine (SEROquel) tablet 200 mg  200 mg Oral At Bedtime     senna-docusate  1 tablet Oral BID    Or     senna-docusate  2 tablet Oral BID     sertraline (ZOLOFT) tablet 100 mg  100 mg Oral Daily     sodium chloride (PF)  3 mL Intracatheter Q8H        Data     Recent Labs  Lab 06/13/18  0915 06/12/18  0759 06/11/18  0757 06/10/18  1050   WBC 8.7 6.7 7.7 4.0   HGB 10.4* 10.5* 11.2* 13.8   MCV 83 83 83 82    158 169 192    143 141 143   POTASSIUM 3.3* 3.6 3.8 3.5   CHLORIDE 106 109 110* 112*   CO2 27 28 26 26   BUN 14 12 11 12   CR 0.67 0.66 0.63 0.76   ANIONGAP 7 6 5 5   MANOLO 8.4* 8.1* 8.5 9.0   * 91 99 90   ALBUMIN  --   --   --  3.7   PROTTOTAL  --   --   --  8.3   BILITOTAL  --   --   --  0.3   ALKPHOS  --   --   --  100   ALT  --   --   --  21   AST  --   --   --  18       No results found for this or any previous visit (from the past 24 hour(s)).

## 2018-06-13 NOTE — PROGRESS NOTES
[facility]    Infectious Disease Progress Note    Date of Service (when I saw the patient): 06/13/2018     Assessment & Plan   Annika Garrido is a 52 year old female who was admitted on 6/10/2018.     Impression:  1. 52 y.o female with long standing history of HIV.   2. Has been on multiple medications in past followed by [facility] and most recently at the [facility] [place].   3. Current HIV regimen is Tvicay and truvada, most recent CD4 count is 140.   4. Qunat TB negative in 2017, RPR negative in a recent office visit.   5. Per patient she has missed a total of 4-5 pills last month, per  there has been more than that.   6. Presented now with: L5-S1 disc herniation, stenosis, and cauda equina syndrome, S/P:   1.  L5-S1 insertion of bilateral pedicle screws and rods  2.  L5-S1 bilateral laminectomies, right medial facetectomy, left complete facetectomy, and bilateral evacuation of disc herniation for decompression of stenosis  3.  Left L5-S1 complete facetectomy, transforaminal discectomy, and interbody arthrodesis  4.  L5-S1 insertion of Patricia Tritanium intervertebral graft with Vitoss allograft     Recommendations:   Fevers over night into early morning, had urinary retention related issues, UA ia negative, BC and UC pending, no new complaints, watch for now.   Presentation perhaps unrelated to HIV,  Viral Load is undetectable, compliance has been an issue per . Most recent CD4 is 140.   Back on Tivicay and Truvada.   Quant TB pending, RPR negative, CMV negative.            MD Janki Charles MD    Interval History   Labs pending   No new complaints   Drowsy     Physical Exam   Temp: 99.1  F (37.3  C) Temp src: Oral BP: (!) 86/55 Pulse: 101 Heart Rate: 104 Resp: 18 SpO2: 99 % O2 Device: None (Room air)    Vitals:    06/10/18 0740   Weight: 82.6 kg (182 lb)     Vital Signs with Ranges  Temp:  [98.6  F (37  C)-102.3  F (39.1  C)] 99.1  F (37.3  C)  Pulse:   [] 101  Heart Rate:  [] 104  Resp:  [16-20] 18  BP: ()/(55-87) 86/55  SpO2:  [94 %-99 %] 99 %    Constitutional: drowsy, wakes up briefly only to go back to sleep   Lungs: Clear to auscultation bilaterally, no crackles or wheezing  Cardiovascular: Regular rate and rhythm, normal S1 and S2, and no murmur noted  Abdomen: Normal bowel sounds, soft, non-distended, non-tender  Skin: No rashes, no cyanosis, no edema  Other:    Medications     0.9% sodium chloride + KCl 20 mEq/L 50 mL/hr at 06/11/18 1742     sodium chloride 100 mL/hr at 06/10/18 1314       acetaminophen  975 mg Oral Q8H     albuterol  2.5 mg Nebulization 4x daily     clonazePAM (klonoPIN) tablet 1 mg  1 mg Oral QAM     clonazePAM (klonoPIN) tablet 1 mg  1 mg Oral At Bedtime     dolutegravir  50 mg Oral Daily     emtricitabine-tenofovir  1 tablet Oral Daily     QUEtiapine (SEROquel) tablet 200 mg  200 mg Oral At Bedtime     senna-docusate  1 tablet Oral BID    Or     senna-docusate  2 tablet Oral BID     sertraline (ZOLOFT) tablet 100 mg  100 mg Oral Daily     sodium chloride (PF)  3 mL Intracatheter Q8H       Data   All microbiology laboratory data reviewed.  Recent Labs   Lab Test  06/12/18   0759  06/11/18   0757  06/10/18   1050   WBC  6.7  7.7  4.0   HGB  10.5*  11.2*  13.8   HCT  31.1*  33.1*  39.8   MCV  83  83  82   PLT  158  169  192     Recent Labs   Lab Test  06/12/18   0759  06/11/18   0757  06/10/18   1050   CR  0.66  0.63  0.76     No lab results found.  No lab results found.    Invalid input(s): ADELE

## 2018-06-13 NOTE — PROVIDER NOTIFICATION
"Text page sent to neurosurgery NP: \"FYI: Pt has had a fever NOC. Max T 102.3 F with Tylenol. Hospitalist notified and CXC, blood and urine cultures ordered. \"  "

## 2018-06-13 NOTE — PLAN OF CARE
Problem: Patient Care Overview  Goal: Plan of Care/Patient Progress Review  Outcome: No Change  Pt lethargic for most of the day. Arouses to voice. Oriented x3, but confused at times. Max T 101.2. BP 86/55, WDL after restarting IVFs. CMS intact, denies N/T. Strength 5/5 BLEs. Lung sounds clear. Incontinent of stool x3. Ingram patent with adequate o/p. Incision WDL with intact staples, dressing changed. Up briefly to chair and commode, wanted to return to bed after 10-15 min each time. Scheduled tylenol and prn oxycodone administered for back pain. D/C to TCU vs ARU pending OT eval.    K 3.3 - replaced per protocol. Recheck scheduled for 2215.

## 2018-06-13 NOTE — PLAN OF CARE
Problem: Patient Care Overview  Goal: Plan of Care/Patient Progress Review  Outcome: No Change  A&O. Neuros/CWMS intact. Slightly tachycardic at times (low 100's) w/fever trending up (Tmax 102.3 this AM); lactic acid normal @ 0.5. Receiving scheduled Tylenol & PRN oxycodone (given x2 during shift for ongoing c/o of 10/10 back pain. Tolerating regular diet. Up with strong assist x2 w/GB. Bed alarm in use on zone 2; Pt can be impulsive at times. Ingram patent & secure w/adequate output. Back dressing CDI. D/C plan TBD.

## 2018-06-13 NOTE — PROGRESS NOTES
[facility]    Neurosurgery Progress Note    Date of Service (when I saw the patient): 06/13/2018     Assessment & Plan   Annika Garrido is a 52 year old female who was admitted on 6/10/2018  with severe back and leg pain, urinary retention, saddle anesthesias, and loss of rectal tone.  MRI showed very large L5-S1 disc herniation with extensive migration, critical stenosis, and loss of disc height. Subsequently underwent L5-S1 TLIF with Dr. Jeison Quinn. Today, she is resting in bed and appears comfortable. States she is feeling well. She did have fevers overnight and CXR and cultures have been ordered for evaluation. She had vidal replaced yesterday, appreciate management per urology. Per RN has had loose stools, recommend holding Senna until normalized. Therapies recommending ARU at [place].     Active Problems:    Cauda equina compression (H)    S/P lumbar fusion    Assessment: s/p L5-S1 fusion    Plan:   -Fever workup per hospitalist  -Recommend restarting fluids  -Vidal replaced yesterday; appreciate management per urology  -Continue to monitor bowel regimen; hold Senna until normalized  -PT/OT and ambulation  -Staple removal 6/24/2018      I have discussed the following assessment and plan with Dr. Quinn who is in agreement with initial plan and will follow up with further consultation recommendations.      Hien Tran PA-C  [facility]  [place] [address]    Tel [phone]  Pager [phone]      Interval History   Fever overnight. Loose BMs.    Physical Exam   Temp: 99.1  F (37.3  C) Temp src: Oral BP: (!) 86/55 Pulse: 101 Heart Rate: 104 Resp: 18 SpO2: 99 % O2 Device: None (Room air)    Vitals:    06/10/18 0740   Weight: 182 lb (82.6 kg)     Vital Signs with Ranges  Temp:  [98.6  F (37  C)-102.3  F (39.1  C)] 99.1  F (37.3  C)  Pulse:  [] 101  Heart Rate:  [] 104  Resp:  [16-20] 18  BP: ()/(55-87)  "86/55  SpO2:  [94 %-99 %] 99 %  I/O last 3 completed shifts:  In: 100 [P.O.:100]  Out: 1575 [Urine:1575]    Heart Rate: 104, Blood pressure (!) 86/55, pulse 101, temperature 99.1  F (37.3  C), temperature source Oral, resp. rate 18, height 5' 5\" (1.651 m), weight 182 lb (82.6 kg), SpO2 99 %.  182 lbs 0 oz  HEENT:  Normocephalic, atraumatic.  PERRLA.  EOM s intact.   Heart:  No peripheral edema  Lungs:  No SOB  Skin:  Warm and dry. Incision covered with dressing CDI. No signs of discharge, erythema, or edema.   Extremities:  No edema, cyanosis or clubbing.    NEUROLOGICAL EXAMINATION:   Mental status:  Alert and Oriented x 3, speech is fluent.  Cranial nerves:  II-XII intact.   Motor:     Hip Flexor:                Right: 5/5  Left:  5/5  Hip Adductor:             Right:  5/5  Left:  5/5  Hip Abductor:             Right:  5/5  Left:  5/5  Gastroc Soleus:        Right:  5/5  Left:  5/5  Tib/Ant:                      Right:  5/5  Left:  5/5  EHL:                     Right:  5/5  Left:  5/5  Sensation:  intact     Medications     0.9% sodium chloride + KCl 20 mEq/L 50 mL/hr at 06/11/18 1742     sodium chloride 100 mL/hr at 06/10/18 1314       acetaminophen  975 mg Oral Q8H     albuterol  2.5 mg Nebulization 4x daily     clonazePAM (klonoPIN) tablet 1 mg  1 mg Oral QAM     clonazePAM (klonoPIN) tablet 1 mg  1 mg Oral At Bedtime     dolutegravir  50 mg Oral Daily     emtricitabine-tenofovir  1 tablet Oral Daily     QUEtiapine (SEROquel) tablet 200 mg  200 mg Oral At Bedtime     senna-docusate  1 tablet Oral BID    Or     senna-docusate  2 tablet Oral BID     sertraline (ZOLOFT) tablet 100 mg  100 mg Oral Daily     sodium chloride (PF)  3 mL Intracatheter Q8H       Data   CBC RESULTS:   Recent Labs   Lab Test  06/13/18   0915   WBC  8.7   RBC  3.66*   HGB  10.4*   HCT  30.5*   MCV  83   MCH  28.4   MCHC  34.1   RDW  13.0   PLT  155     Basic Metabolic Panel:  Lab Results   Component Value Date     06/12/2018      Lab " Results   Component Value Date    POTASSIUM 3.6 06/12/2018     Lab Results   Component Value Date    CHLORIDE 109 06/12/2018     Lab Results   Component Value Date    MANOLO 8.1 06/12/2018     Lab Results   Component Value Date    CO2 28 06/12/2018     Lab Results   Component Value Date    BUN 12 06/12/2018     Lab Results   Component Value Date    CR 0.66 06/12/2018     Lab Results   Component Value Date    GLC 91 06/12/2018     INR:  No results found for: INR

## 2018-06-13 NOTE — PLAN OF CARE
Problem: Patient Care Overview  Goal: Plan of Care/Patient Progress Review  Discharge Planner PT   Patient plan for discharge: Not stated  Current status: Pt requesting tx to commode for BM. Pt requires modA of 1 and single step VC for log roll technique for supine>sit. Pt transfers sit<>stand and bed<>commode with FWW and modA of 2. BP supine 104/56, dropping to 86/55 while pt up on commode - deferred further activity, pt reporting lightheadedness. Safely returned to supine and pt sleeping prior to PT exit. Continues to be limited by lethargy.  Barriers to return to prior living situation: Current level of assist, decreased activity tolerance, lethargic, currently unable to ambulate, stairs  Recommendations for discharge: ARU  Rationale for recommendations:  Pending improvement in activity tolerance, may be appropriate for ARU. Pt will benefit from continued skilled daily therapies to progress functional strength, activity tolerance, safety and IND with functional mobility prior to return home.       Entered by: Dorothy Bowles 06/13/2018 8:49 AM

## 2018-06-13 NOTE — PROVIDER NOTIFICATION
"MD Notification    Notified Person: Hospitalist    Notified Person Name: Thiago Persaud MD    Notification Date/Time: 6/13/18  07:45    Notification Interaction: text-paged physician    Purpose of Notification: see below    Orders Received: n/a    Comments:  \"FYI- oral temps trending up overnight (102.3 Tmax @ 06:00) lactic acid 0.5; will continue to monitor. Slightly tachy low 100's\"  "

## 2018-06-13 NOTE — CONSULTS
[facility] Psychiatric Consult Progress Note      Interval History:   Pt seen, care reviewed with treatment team. Staff report that patient remains withdrawn and odd but she has remained oriented. She reportedly has been inconsistent in her recollection of the circumstances of her admission. It is unclear if her positive UDS for cocaine and had anything to do with her degree of somnolence and depression.  Today patient tells me she continues to feel depressed and admits to chronic suicidal ideations but she denies any intent or plans.  She states she is planning to follow up with her provider Menea Delgadillo MS CNP next month and would prefer to remain on her currently prescribed psychiatric medications.  She was advised that her clonazepam dose was decreased yesterday on account of her somnolence and she is agreeable with maintaining her currently prescribed dose.  The benefits of adjusting her antidepressant medications were discussed with her but she states that her current status is not new and she would like to be discharged today.   Review of systems:   The Review of Systems is negative other than noted in the HPI     Medications:       acetaminophen  975 mg Oral Q8H     albuterol  2.5 mg Nebulization 4x daily     clonazePAM (klonoPIN) tablet 1 mg  1 mg Oral QAM     clonazePAM (klonoPIN) tablet 1 mg  1 mg Oral At Bedtime     dolutegravir  50 mg Oral Daily     emtricitabine-tenofovir  1 tablet Oral Daily     QUEtiapine (SEROquel) tablet 200 mg  200 mg Oral At Bedtime     senna-docusate  1 tablet Oral BID    Or     senna-docusate  2 tablet Oral BID     sertraline (ZOLOFT) tablet 100 mg  100 mg Oral Daily     sodium chloride (PF)  3 mL Intracatheter Q8H     acetaminophen, albuterol, bisacodyl, hydrALAZINE, HYDROmorphone, labetalol, lidocaine 4%, lidocaine (buffered or not buffered), magnesium sulfate, metoclopramide **OR** metoclopramide, naloxone, nicotine polacrilex, ondansetron **OR** ondansetron,  oxyCODONE IR, polyethylene glycol, potassium chloride, potassium chloride with lidocaine, potassium chloride, potassium chloride, potassium chloride, prochlorperazine **OR** prochlorperazine **OR** prochlorperazine, senna-docusate **OR** senna-docusate, sodium chloride (PF)      Mental Status Examination:     Appearance:  awake, alert, appeared older than stated age and tired appearing  Eye Contact:  fair  Speech:  paucity of speech and slow  Psychomotor Behavior:  no evidence of tardive dyskinesia, dystonia, or tics  Mood:  sad  and depressed  Affect:  mood congruent and intensity is flat  Thought Process:  linear and goal oriented no loose associations  Thought Content:  no evidence of psychotic thought and passive suicidal ideation present  Oriented to:  time, person, and place  Attention Span and Concentration:  fair  Recent and Remote Memory:  limited  Fund of Knowledge: low-normal  Muscle Strength and Tone: normal  Gait and Station: NA  Insight:  fair  Judgment:  limited        Labs/vitals:     Recent Results (from the past 24 hour(s))   Drug abuse screen 77 urine ([place], [place], SH)    Collection Time: 06/13/18  6:00 AM   Result Value Ref Range    Amphetamine Qual Urine Negative NEG^Negative    Barbiturates Qual Urine Negative NEG^Negative    Benzodiazepine Qual Urine Negative NEG^Negative    Cannabinoids Qual Urine Negative NEG^Negative    Cocaine Qual Urine Positive (A) NEG^Negative    Opiates Qualitative Urine Negative NEG^Negative    PCP Qual Urine Negative NEG^Negative   Lactic acid level STAT for sepsis protocol    Collection Time: 06/13/18  6:05 AM   Result Value Ref Range    Lactate for Sepsis Protocol 0.5 0.4 - 1.9 mmol/L   Magnesium    Collection Time: 06/13/18  6:05 AM   Result Value Ref Range    Magnesium 2.0 1.6 - 2.3 mg/dL   CBC with platelets differential    Collection Time: 06/13/18  9:15 AM   Result Value Ref Range    WBC 8.7 4.0 - 11.0 10e9/L    RBC Count 3.66 (L) 3.8 - 5.2 10e12/L    Hemoglobin  10.4 (L) 11.7 - 15.7 g/dL    Hematocrit 30.5 (L) 35.0 - 47.0 %    MCV 83 78 - 100 fl    MCH 28.4 26.5 - 33.0 pg    MCHC 34.1 31.5 - 36.5 g/dL    RDW 13.0 10.0 - 15.0 %    Platelet Count 155 150 - 450 10e9/L    Diff Method Automated Method     % Neutrophils 67.0 %    % Lymphocytes 17.7 %    % Monocytes 14.6 %    % Eosinophils 0.2 %    % Basophils 0.2 %    % Immature Granulocytes 0.3 %    Nucleated RBCs 0 0 /100    Absolute Neutrophil 5.8 1.6 - 8.3 10e9/L    Absolute Lymphocytes 1.5 0.8 - 5.3 10e9/L    Absolute Monocytes 1.3 0.0 - 1.3 10e9/L    Absolute Eosinophils 0.0 0.0 - 0.7 10e9/L    Absolute Basophils 0.0 0.0 - 0.2 10e9/L    Abs Immature Granulocytes 0.0 0 - 0.4 10e9/L    Absolute Nucleated RBC 0.0    Basic metabolic panel    Collection Time: 06/13/18  9:15 AM   Result Value Ref Range    Sodium 140 133 - 144 mmol/L    Potassium 3.3 (L) 3.4 - 5.3 mmol/L    Chloride 106 94 - 109 mmol/L    Carbon Dioxide 27 20 - 32 mmol/L    Anion Gap 7 3 - 14 mmol/L    Glucose 100 (H) 70 - 99 mg/dL    Urea Nitrogen 14 7 - 30 mg/dL    Creatinine 0.67 0.52 - 1.04 mg/dL    GFR Estimate >90 >60 mL/min/1.7m2    GFR Estimate If Black >90 >60 mL/min/1.7m2    Calcium 8.4 (L) 8.5 - 10.1 mg/dL   UA with Microscopic reflex to Culture    Collection Time: 06/13/18  9:48 AM   Result Value Ref Range    Color Urine Yellow     Appearance Urine Clear     Glucose Urine Negative NEG^Negative mg/dL    Bilirubin Urine Negative NEG^Negative    Ketones Urine Negative NEG^Negative mg/dL    Specific Gravity Urine 1.026 1.003 - 1.035    Blood Urine Negative NEG^Negative    pH Urine 6.0 5.0 - 7.0 pH    Protein Albumin Urine 10 (A) NEG^Negative mg/dL    Urobilinogen mg/dL >12.0 (H) 0.0 - 2.0 mg/dL    Nitrite Urine Negative NEG^Negative    Leukocyte Esterase Urine Moderate (A) NEG^Negative    Source Catheterized Urine     WBC Urine 5 0 - 5 /HPF    RBC Urine 4 (H) 0 - 2 /HPF    Squamous Epithelial /HPF Urine <1 0 - 1 /HPF    Mucous Urine Present (A)  NEG^Negative /LPF     B/P: 124/62, T: 97.9, P: 101, R: 18    Impression:   Annika Garrido is a 52-year-old woman with history of human immunodeficiency virus infection who was admitted on account of bilateral numbness in her thighs and difficulty urinating associated with blunt trauma a week prior.  She has been found to have cauda equina syndrome for which she underwent L5 to S1 TLIF with Dr. Jeison Quinn and is stable in her third postoperative day.  She remains depressed but continues to insist on remaining on her currently prescribed mediations.      DIagnoses:     1.  Major depressive disorder, recurrent, moderate.   2.  Cauda equina compression status post lumbar fusion.   3.  Human immunodeficiency virus infection.          Plan:   1. Written information given on medications. Side effects, risks, benefits reviewed.  2.  Maintain current medications without changes but encouraged patient to follow up with Meena Delgadillo MS CNP at [facility] as soon as possible.  3.  No further intervention from psychiatry at this time.        Attestation:  Patient has been seen and evaluated by Evgeny polanco MD

## 2018-06-14 ENCOUNTER — APPOINTMENT (OUTPATIENT)
Dept: OCCUPATIONAL THERAPY | Facility: CLINIC | Age: 53
DRG: 459 | End: 2018-06-14
Attending: INTERNAL MEDICINE
Payer: COMMERCIAL

## 2018-06-14 ENCOUNTER — APPOINTMENT (OUTPATIENT)
Dept: PHYSICAL THERAPY | Facility: CLINIC | Age: 53
DRG: 459 | End: 2018-06-14
Payer: COMMERCIAL

## 2018-06-14 LAB
ANION GAP SERPL CALCULATED.3IONS-SCNC: 6 MMOL/L (ref 3–14)
BACTERIA SPEC CULT: NO GROWTH
BUN SERPL-MCNC: 12 MG/DL (ref 7–30)
CALCIUM SERPL-MCNC: 8.7 MG/DL (ref 8.5–10.1)
CHLORIDE SERPL-SCNC: 106 MMOL/L (ref 94–109)
CO2 SERPL-SCNC: 25 MMOL/L (ref 20–32)
CREAT SERPL-MCNC: 0.66 MG/DL (ref 0.52–1.04)
ERYTHROCYTE [DISTWIDTH] IN BLOOD BY AUTOMATED COUNT: 13.1 % (ref 10–15)
GFR SERPL CREATININE-BSD FRML MDRD: >90 ML/MIN/1.7M2
GLUCOSE BLDC GLUCOMTR-MCNC: 131 MG/DL (ref 70–99)
GLUCOSE SERPL-MCNC: 101 MG/DL (ref 70–99)
HCT VFR BLD AUTO: 31.9 % (ref 35–47)
HGB BLD-MCNC: 10.8 G/DL (ref 11.7–15.7)
Lab: NORMAL
MCH RBC QN AUTO: 28.1 PG (ref 26.5–33)
MCHC RBC AUTO-ENTMCNC: 33.9 G/DL (ref 31.5–36.5)
MCV RBC AUTO: 83 FL (ref 78–100)
PLATELET # BLD AUTO: 198 10E9/L (ref 150–450)
POTASSIUM SERPL-SCNC: 3.9 MMOL/L (ref 3.4–5.3)
RBC # BLD AUTO: 3.84 10E12/L (ref 3.8–5.2)
SODIUM SERPL-SCNC: 137 MMOL/L (ref 133–144)
SPECIMEN SOURCE: NORMAL
WBC # BLD AUTO: 9 10E9/L (ref 4–11)

## 2018-06-14 PROCEDURE — 36415 COLL VENOUS BLD VENIPUNCTURE: CPT | Performed by: INTERNAL MEDICINE

## 2018-06-14 PROCEDURE — 25000132 ZZH RX MED GY IP 250 OP 250 PS 637: Performed by: PSYCHIATRY & NEUROLOGY

## 2018-06-14 PROCEDURE — 87040 BLOOD CULTURE FOR BACTERIA: CPT | Performed by: INTERNAL MEDICINE

## 2018-06-14 PROCEDURE — 25000132 ZZH RX MED GY IP 250 OP 250 PS 637: Performed by: INTERNAL MEDICINE

## 2018-06-14 PROCEDURE — 40000193 ZZH STATISTIC PT WARD VISIT: Performed by: PHYSICAL THERAPIST

## 2018-06-14 PROCEDURE — 97167 OT EVAL HIGH COMPLEX 60 MIN: CPT | Mod: GO

## 2018-06-14 PROCEDURE — 25000132 ZZH RX MED GY IP 250 OP 250 PS 637: Performed by: NEUROLOGICAL SURGERY

## 2018-06-14 PROCEDURE — 99232 SBSQ HOSP IP/OBS MODERATE 35: CPT | Performed by: INTERNAL MEDICINE

## 2018-06-14 PROCEDURE — 85027 COMPLETE CBC AUTOMATED: CPT | Performed by: INTERNAL MEDICINE

## 2018-06-14 PROCEDURE — 00000146 ZZHCL STATISTIC GLUCOSE BY METER IP

## 2018-06-14 PROCEDURE — 97530 THERAPEUTIC ACTIVITIES: CPT | Mod: GO

## 2018-06-14 PROCEDURE — 94640 AIRWAY INHALATION TREATMENT: CPT

## 2018-06-14 PROCEDURE — 40000133 ZZH STATISTIC OT WARD VISIT

## 2018-06-14 PROCEDURE — 12000000 ZZH R&B MED SURG/OB

## 2018-06-14 PROCEDURE — 97116 GAIT TRAINING THERAPY: CPT | Mod: GP | Performed by: PHYSICAL THERAPIST

## 2018-06-14 PROCEDURE — 25000125 ZZHC RX 250: Performed by: INTERNAL MEDICINE

## 2018-06-14 PROCEDURE — 80048 BASIC METABOLIC PNL TOTAL CA: CPT | Performed by: INTERNAL MEDICINE

## 2018-06-14 PROCEDURE — 94640 AIRWAY INHALATION TREATMENT: CPT | Mod: 76

## 2018-06-14 PROCEDURE — 40000275 ZZH STATISTIC RCP TIME EA 10 MIN

## 2018-06-14 PROCEDURE — 97530 THERAPEUTIC ACTIVITIES: CPT | Mod: GP | Performed by: PHYSICAL THERAPIST

## 2018-06-14 RX ADMIN — ALBUTEROL SULFATE 2.5 MG: 2.5 SOLUTION RESPIRATORY (INHALATION) at 11:37

## 2018-06-14 RX ADMIN — EMTRICITABINE AND TENOFOVIR DISOPROXIL FUMARATE 1 TABLET: 200; 300 TABLET, FILM COATED ORAL at 09:57

## 2018-06-14 RX ADMIN — ALBUTEROL SULFATE 2.5 MG: 2.5 SOLUTION RESPIRATORY (INHALATION) at 08:13

## 2018-06-14 RX ADMIN — ALBUTEROL SULFATE 2.5 MG: 2.5 SOLUTION RESPIRATORY (INHALATION) at 20:10

## 2018-06-14 RX ADMIN — ALBUTEROL SULFATE 2.5 MG: 2.5 SOLUTION RESPIRATORY (INHALATION) at 15:50

## 2018-06-14 RX ADMIN — SERTRALINE HYDROCHLORIDE 100 MG: 100 TABLET ORAL at 10:00

## 2018-06-14 RX ADMIN — DOLUTEGRAVIR SODIUM 50 MG: 50 TABLET, FILM COATED ORAL at 09:57

## 2018-06-14 RX ADMIN — CLONAZEPAM 1 MG: 1 TABLET ORAL at 09:57

## 2018-06-14 RX ADMIN — ACETAMINOPHEN 650 MG: 325 TABLET, FILM COATED ORAL at 15:47

## 2018-06-14 RX ADMIN — ACETAMINOPHEN 650 MG: 325 TABLET, FILM COATED ORAL at 04:37

## 2018-06-14 RX ADMIN — ACETAMINOPHEN 650 MG: 325 TABLET, FILM COATED ORAL at 09:57

## 2018-06-14 RX ADMIN — CLONAZEPAM 1 MG: 1 TABLET ORAL at 22:44

## 2018-06-14 RX ADMIN — Medication 2.5 MG: at 09:57

## 2018-06-14 RX ADMIN — NICOTINE POLACRILEX 2 MG: 2 LOZENGE ORAL at 14:24

## 2018-06-14 RX ADMIN — Medication 2.5 MG: at 17:29

## 2018-06-14 ASSESSMENT — ACTIVITIES OF DAILY LIVING (ADL)
ADLS_ACUITY_SCORE: 20
ADLS_ACUITY_SCORE: 17

## 2018-06-14 NOTE — PLAN OF CARE
Problem: Patient Care Overview  Goal: Plan of Care/Patient Progress Review  Outcome: No Change  A&O. Neuros/CWMS intact. Slightly tachycardic at times (low 100's) w/fever trending up again (Tmax 102.8 this AM; responded to Tylenol> 100.0, 99.3); Denies back pain. Tolerating regular diet. Up with strong assist x2 w/GB. Bed alarm in use on zone 2; Pt can be impulsive at times. K+ re-check 4.1. Receiving IVF. Ingram patent & secure w/adequate output. Back dressing remains CDI. D/C plan TBD when Pt vitally stable.

## 2018-06-14 NOTE — PROGRESS NOTES
[facility]    Infectious Disease Progress Note    Date of Service (when I saw the patient): 06/14/2018     Assessment & Plan   Annika Garrido is a 52 year old female who was admitted on 6/10/2018.     Impression:  1. 52 y.o female with long standing history of HIV.   2. Has been on multiple medications in past followed by [facility] and most recently at the [facility] [place] [facility] [place]   3. Current HIV regimen is Tvicay and truvada, most recent CD4 count is 140.   4. Qunat TB negative in 2017, RPR negative in a recent office visit.   5. Per patient she has missed a total of 4-5 pills last month, per  there has been more than that.   6. Presented now with: L5-S1 disc herniation, stenosis, and cauda equina syndrome, S/P:   1.  L5-S1 insertion of bilateral pedicle screws and rods  2.  L5-S1 bilateral laminectomies, right medial facetectomy, left complete facetectomy, and bilateral evacuation of disc herniation for decompression of stenosis  3.  Left L5-S1 complete facetectomy, transforaminal discectomy, and interbody arthrodesis  4.  L5-S1 insertion of Patricia Tritanium intervertebral graft with Vitoss allograft     Recommendations:   Fevers over night into early morning, had urinary retention related issues, UA ia negative, BC and UC pending, no new complaints, ? Atelectasis, repeat UA and UC, no antibiotics for now.   Presentation perhaps unrelated to HIV,  Viral Load is undetectable, compliance has been an issue per . Most recent CD4 is 140.   Back on Tivicay and Truvada.   Quant TB pending, RPR negative, TPPA positive ( known history of treated in past syphilis)  CMV negative.            MD Janki Charles MD    Interval History   Labs pending   No new complaints   Drowsy     Physical Exam   Temp: 99.3  F (37.4  C) Temp src: Oral BP: 137/80 Pulse: 90 Heart Rate: 90 Resp: 16 SpO2: 95 % O2 Device: None (Room air)    Vitals:    06/10/18 0740   Weight: 82.6 kg (182 lb)      Vital Signs with Ranges  Temp:  [97.9  F (36.6  C)-102.8  F (39.3  C)] 99.3  F (37.4  C)  Pulse:  [] 90  Heart Rate:  [72-90] 90  Resp:  [16-18] 16  BP: (103-137)/(52-83) 137/80  SpO2:  [94 %-98 %] 95 %    Constitutional: drowsy, wakes up briefly only to go back to sleep   Lungs: Clear to auscultation bilaterally, no crackles or wheezing  Cardiovascular: Regular rate and rhythm, normal S1 and S2, and no murmur noted  Abdomen: Normal bowel sounds, soft, non-distended, non-tender  Skin: No rashes, no cyanosis, no edema  Other:    Medications     0.9% sodium chloride + KCl 20 mEq/L 50 mL/hr at 06/13/18 1710       albuterol  2.5 mg Nebulization 4x daily     clonazePAM (klonoPIN) tablet 1 mg  1 mg Oral QAM     clonazePAM (klonoPIN) tablet 1 mg  1 mg Oral At Bedtime     dolutegravir  50 mg Oral Daily     emtricitabine-tenofovir  1 tablet Oral Daily     QUEtiapine (SEROquel) tablet 200 mg  200 mg Oral At Bedtime     senna-docusate  1 tablet Oral BID    Or     senna-docusate  2 tablet Oral BID     sertraline (ZOLOFT) tablet 100 mg  100 mg Oral Daily     sodium chloride (PF)  3 mL Intracatheter Q8H       Data   All microbiology laboratory data reviewed.  Recent Labs   Lab Test  06/13/18   0915  06/12/18   0759  06/11/18   0757   WBC  8.7  6.7  7.7   HGB  10.4*  10.5*  11.2*   HCT  30.5*  31.1*  33.1*   MCV  83  83  83   PLT  155  158  169     Recent Labs   Lab Test  06/13/18   0915  06/12/18   0759  06/11/18   0757   CR  0.67  0.66  0.63     No lab results found.  Recent Labs   Lab Test  06/13/18   0948  06/13/18   0915   CULT  PENDING  No growth after 18 hours

## 2018-06-14 NOTE — PROGRESS NOTES
"   06/14/18 0826   Quick Adds   Type of Visit Initial Occupational Therapy Evaluation   Living Environment   Lives With spouse;grandchild(angel)   Living Arrangements house   Home Accessibility stairs to enter home   Number of Stairs to Enter Home 2   Transportation Available taxi   Living Environment Comment  is PCA for 2 hours a day.    Functional Level Prior   Ambulation 1-->assistive equipment   Transferring 3-->assistive equipment and person   Toileting 1-->assistive equipment   Bathing 2-->assistive person   Dressing 2-->assistive person   Eating 0-->independent   Communication 0-->understands/communicates without difficulty   Prior Functional Level Comment  helps with dressing/bathing, meal preparation and manages meds. Pt needs A sitting up at home.    General Information   Onset of Illness/Injury or Date of Surgery - Date 06/10/18   Referring Physician Cori   Additional Occupational Profile Info/Pertinent History of Current Problem Admitted with cauda equina syndrome and lumbaer stenosis (L5-SI disc herniation) underwent L5-S1 TLIF   Precautions/Limitations spinal precautions;fall precautions   Cognitive Status Examination   Orientation place;person;orientation to person, place and time  (Knew the day of week but not date (off by one))   Level of Consciousness lethargic/somnolent   Able to Follow Commands moderate impairment   Cognitive Comment Difficulty carrying over taught techniques; keeps eyes closed and needing VC to open them. Could recall 1/3 precautions (bending; said another one was sitting up straight)   Sensory Examination   Sensory Comments Did not screen but pt reported \"I cant feel my lower back\" when repositioning in bed   Pain Assessment   Patient Currently in Pain Yes, see Vital Sign flowsheet  (No number stated )   Posture   Posture Comments Difficulty standing up straight   Mobility   Bed Mobility Bed mobility skill: Supine to sit   Bed Mobility Skill: Supine to Sit "   Level of [place]: Supine/Sit minimum assist (75% patients effort)   Transfer Skill: Sit to Stand   Level of [place]: Sit/Stand moderate assist (50% patients effort)   Assistive Device for Transfer: Sit/Stand rolling walker   Transfer Skill: Toilet Transfer   Level of [place]: Toilet minimum assist (75% patients effort)   Assistive Device rolling walker   Toilet Transfer Skill Comments Commode used over toilet to ensure precautions   Balance   Balance Comments Poor balance while ambulating/transfering   Lower Body Dressing   Level of [place]: Dress Lower Body dependent (less than 25% patients effort)  (Difficulty bending legs up)   Toileting   Level of [place]: Toilet minimum assist (75% patients effort)  (Needing help to pull down/up pull up)   Activities of Daily Living Analysis   Impairments Contributing to Impaired Activities of Daily Living balance impaired;flexibility decreased;pain;post surgical precautions  (Activity tolerance and general weakness )   General Therapy Interventions   Planned Therapy Interventions ADL retraining;transfer training   Intervention Comments Activity tolerance with ADLs and look further into cog as chart review shows a need/ not able to follow commands during eval   Clinical Impression   Criteria for Skilled Therapeutic Interventions Met yes, treatment indicated   OT Diagnosis Decreased ADLs   Influenced by the following impairments Activity tolerance, balance, pain, flexibility, general weakness, command following   Assessment of Occupational Performance 5 or more Performance Deficits   Identified Performance Deficits Dressing, bathing, toileting, transfers,    Clinical Decision Making (Complexity) High complexity   Therapy Frequency daily   Predicted Duration of Therapy Intervention (days/wks) 3   Anticipated Discharge Disposition Acute Rehabilitation Facility;Transitional Care Facility   Risks and Benefits of Treatment have been explained. Yes    Patient, Family & other staff in agreement with plan of care Yes   Clinical Impression Comments Unsure if pt will be able to tolerate ARF   Total Evaluation Time   Total Evaluation Time (Minutes) 10

## 2018-06-14 NOTE — PLAN OF CARE
Problem: Patient Care Overview  Goal: Plan of Care/Patient Progress Review  Discharge Planner PT   Patient plan for discharge: wants to return home with PCA assist  Current status: Patient being strongly encouraged to get OOB by MD upon therapist arrival; declined mobility this am; Willing to work with PT this pm; Decreased command following noted during session; frequently letting go of walker; decreased following of spinal precautions even with cues; impulsive with movement; able to ambulate 125 feet this pm with min/mod A of 1 and walker; poor safety; transfers with min/mod A of 1 secondary to poor safety and impaired balance  Barriers to return to prior living situation: current level of assist; impaired cognition; decreased safety; falls risk  Recommendations for discharge: TCU; if refusing, patient will need 24/7 assist for all mobility and cares to return directly home  Rationale for recommendations: Patient would benefit from ongoing skilled PT to maximize independence with functional mobility, balance, spinal precautions.       Entered by: Trina Orr 06/14/2018 2:17 PM

## 2018-06-14 NOTE — PLAN OF CARE
"Problem: Patient Care Overview  Goal: Plan of Care/Patient Progress Review  PT: PT session attempted. Spent >10min attempting to engage pt in PT session. Pt lying prone, did not turn to look at therapist during conversation. Pt repeatedly asking writer to \"come back later\". Despite encouraged and education on importance of therapy, having scheduled PT times, and demonstrating participation and progressing her mobility, pt continues to refuse and states \"just come back tomorrow then\".       "

## 2018-06-14 NOTE — PROGRESS NOTES
[facility]    Neurosurgery  Daily Post-Op Note    Assessment & Plan   Procedure(s):  OPTICAL TRACKING SYSTEM FUSION SPINE POSTERIOR LUMBAR ONE LEVEL   -4 Days Post-Op  Postop day 4 from L5-S1 TLIF.  She states her pain is improving.  The numbness in her lower extremities has resolved.  Plan:  -Continue working with physical therapy to increase mobility and safety.  She has so far been very resistant to working with physical therapy, and I discussed with her that this is essential in getting her home safely.  She voiced agreement and understanding.      Eze Jarrett    Interval History   Stable.   Improving slowly.  Pain is reasonably controlled.  No fevers.     Physical Exam   Temp: 98.9  F (37.2  C) Temp src: Oral BP: 114/64 Pulse: 90 Heart Rate: 83 Resp: 16 SpO2: 96 % O2 Device: None (Room air)    Vitals:    06/10/18 0740   Weight: 182 lb (82.6 kg)     Vital Signs with Ranges  Temp:  [97.9  F (36.6  C)-102.8  F (39.3  C)] 98.9  F (37.2  C)  Pulse:  [] 90  Heart Rate:  [72-96] 83  Resp:  [16-18] 16  BP: (103-137)/(52-83) 114/64  SpO2:  [94 %-98 %] 96 %  I/O last 3 completed shifts:  In: 1034 [P.O.:240; I.V.:794]  Out: 1825 [Urine:1825]    Alert and oriented.  Moves all extremities equally.  Reflexes symmetrical.     Incision: CDI.  Staples intact.      Medications     0.9% sodium chloride + KCl 20 mEq/L 50 mL/hr at 06/13/18 1710        albuterol  2.5 mg Nebulization 4x daily     clonazePAM (klonoPIN) tablet 1 mg  1 mg Oral QAM     clonazePAM (klonoPIN) tablet 1 mg  1 mg Oral At Bedtime     dolutegravir  50 mg Oral Daily     emtricitabine-tenofovir  1 tablet Oral Daily     QUEtiapine (SEROquel) tablet 200 mg  200 mg Oral At Bedtime     senna-docusate  1 tablet Oral BID    Or     senna-docusate  2 tablet Oral BID     sertraline (ZOLOFT) tablet 100 mg  100 mg Oral Daily     sodium chloride (PF)  3 mL Intracatheter Q8H             Eze Jarrett PA-C  [facility]
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
## 2018-06-14 NOTE — PLAN OF CARE
Problem: Patient Care Overview  Goal: Plan of Care/Patient Progress Review  Discharge Planner OT   Patient plan for discharge: Home.  Current status: Eval complete and treatment initiated. Patient with no carryover throughout session of education on spinal precautions or walker safety. Lethargic throughout, needing multiple cues to keep her eyes open. MIN A and walker to ambulate to the bathroom and back, observed in bathroom going against her spinal precautions. DEP for LE dressing. C/o fatigue after short distance ambulation.   Barriers to return to prior living situation: Confusion, spinal precautions, impaired mobility and ADL.   Recommendations for discharge: TCU.  Rationale for recommendations: OT doesn't feel patient will be able to participate in intensive rehab at this time due to confusion, impaired safety and lethargy. Will continue to work with patient daily here to assist in improving I.       Entered by: Francisca Ott 06/14/2018 10:36 AM

## 2018-06-15 ENCOUNTER — APPOINTMENT (OUTPATIENT)
Dept: OCCUPATIONAL THERAPY | Facility: CLINIC | Age: 53
DRG: 459 | End: 2018-06-15
Payer: COMMERCIAL

## 2018-06-15 ENCOUNTER — APPOINTMENT (OUTPATIENT)
Dept: PHYSICAL THERAPY | Facility: CLINIC | Age: 53
DRG: 459 | End: 2018-06-15
Payer: COMMERCIAL

## 2018-06-15 LAB
ANION GAP SERPL CALCULATED.3IONS-SCNC: 7 MMOL/L (ref 3–14)
BUN SERPL-MCNC: 12 MG/DL (ref 7–30)
CALCIUM SERPL-MCNC: 8.7 MG/DL (ref 8.5–10.1)
CHLORIDE SERPL-SCNC: 105 MMOL/L (ref 94–109)
CO2 SERPL-SCNC: 26 MMOL/L (ref 20–32)
CREAT SERPL-MCNC: 0.64 MG/DL (ref 0.52–1.04)
GFR SERPL CREATININE-BSD FRML MDRD: >90 ML/MIN/1.7M2
GLUCOSE SERPL-MCNC: 97 MG/DL (ref 70–99)
POTASSIUM SERPL-SCNC: 4.2 MMOL/L (ref 3.4–5.3)
SODIUM SERPL-SCNC: 138 MMOL/L (ref 133–144)
T PALLIDUM AB SER QL AGGL: REACTIVE

## 2018-06-15 PROCEDURE — 94640 AIRWAY INHALATION TREATMENT: CPT

## 2018-06-15 PROCEDURE — 25000125 ZZHC RX 250: Performed by: INTERNAL MEDICINE

## 2018-06-15 PROCEDURE — 25000132 ZZH RX MED GY IP 250 OP 250 PS 637: Performed by: INTERNAL MEDICINE

## 2018-06-15 PROCEDURE — 40000275 ZZH STATISTIC RCP TIME EA 10 MIN

## 2018-06-15 PROCEDURE — 80048 BASIC METABOLIC PNL TOTAL CA: CPT | Performed by: INTERNAL MEDICINE

## 2018-06-15 PROCEDURE — 99232 SBSQ HOSP IP/OBS MODERATE 35: CPT | Performed by: INTERNAL MEDICINE

## 2018-06-15 PROCEDURE — 25000132 ZZH RX MED GY IP 250 OP 250 PS 637: Performed by: PHYSICIAN ASSISTANT

## 2018-06-15 PROCEDURE — 36415 COLL VENOUS BLD VENIPUNCTURE: CPT | Performed by: INTERNAL MEDICINE

## 2018-06-15 PROCEDURE — 97530 THERAPEUTIC ACTIVITIES: CPT | Mod: GP

## 2018-06-15 PROCEDURE — 12000000 ZZH R&B MED SURG/OB

## 2018-06-15 PROCEDURE — 94640 AIRWAY INHALATION TREATMENT: CPT | Mod: 76

## 2018-06-15 PROCEDURE — 40000193 ZZH STATISTIC PT WARD VISIT

## 2018-06-15 PROCEDURE — 97116 GAIT TRAINING THERAPY: CPT | Mod: GP

## 2018-06-15 PROCEDURE — 40000133 ZZH STATISTIC OT WARD VISIT: Performed by: OCCUPATIONAL THERAPIST

## 2018-06-15 PROCEDURE — 97535 SELF CARE MNGMENT TRAINING: CPT | Mod: GO | Performed by: OCCUPATIONAL THERAPIST

## 2018-06-15 PROCEDURE — 25000132 ZZH RX MED GY IP 250 OP 250 PS 637: Performed by: NEUROLOGICAL SURGERY

## 2018-06-15 PROCEDURE — 99207 ZZC CDG-CODE CATEGORY CHANGED: CPT | Performed by: INTERNAL MEDICINE

## 2018-06-15 PROCEDURE — 25000132 ZZH RX MED GY IP 250 OP 250 PS 637: Performed by: PSYCHIATRY & NEUROLOGY

## 2018-06-15 RX ORDER — CLONAZEPAM 1 MG/1
TABLET ORAL
Status: SHIPPED | DISCHARGE
Start: 2018-06-15 | End: 2020-05-06

## 2018-06-15 RX ORDER — POLYETHYLENE GLYCOL 3350 17 G/17G
17 POWDER, FOR SOLUTION ORAL DAILY PRN
Qty: 7 PACKET | DISCHARGE
Start: 2018-06-15 | End: 2022-12-21

## 2018-06-15 RX ORDER — OXYCODONE HYDROCHLORIDE 5 MG/1
2.5 TABLET ORAL EVERY 6 HOURS PRN
Qty: 6 TABLET | Refills: 0 | Status: SHIPPED | DISCHARGE
Start: 2018-06-15 | End: 2018-09-10

## 2018-06-15 RX ORDER — POLYETHYLENE GLYCOL 3350 17 G
2 POWDER IN PACKET (EA) ORAL
Qty: 360 LOZENGE | DISCHARGE
Start: 2018-06-15 | End: 2022-12-21

## 2018-06-15 RX ADMIN — ALBUTEROL SULFATE 2.5 MG: 2.5 SOLUTION RESPIRATORY (INHALATION) at 15:49

## 2018-06-15 RX ADMIN — DOLUTEGRAVIR SODIUM 50 MG: 50 TABLET, FILM COATED ORAL at 08:50

## 2018-06-15 RX ADMIN — Medication 2.5 MG: at 08:50

## 2018-06-15 RX ADMIN — QUETIAPINE FUMARATE 200 MG: 50 TABLET ORAL at 21:11

## 2018-06-15 RX ADMIN — SERTRALINE HYDROCHLORIDE 100 MG: 100 TABLET ORAL at 08:51

## 2018-06-15 RX ADMIN — Medication 2.5 MG: at 16:07

## 2018-06-15 RX ADMIN — ACETAMINOPHEN 650 MG: 325 TABLET, FILM COATED ORAL at 08:50

## 2018-06-15 RX ADMIN — ACETAMINOPHEN 650 MG: 325 TABLET, FILM COATED ORAL at 16:07

## 2018-06-15 RX ADMIN — CLONAZEPAM 1 MG: 1 TABLET ORAL at 08:51

## 2018-06-15 RX ADMIN — ALBUTEROL SULFATE 2.5 MG: 2.5 SOLUTION RESPIRATORY (INHALATION) at 08:03

## 2018-06-15 RX ADMIN — NICOTINE POLACRILEX 2 MG: 2 LOZENGE ORAL at 16:05

## 2018-06-15 RX ADMIN — CLONAZEPAM 1 MG: 1 TABLET ORAL at 21:12

## 2018-06-15 RX ADMIN — SENNOSIDES AND DOCUSATE SODIUM 2 TABLET: 8.6; 5 TABLET ORAL at 21:11

## 2018-06-15 RX ADMIN — EMTRICITABINE AND TENOFOVIR DISOPROXIL FUMARATE 1 TABLET: 200; 300 TABLET, FILM COATED ORAL at 08:51

## 2018-06-15 ASSESSMENT — ACTIVITIES OF DAILY LIVING (ADL)
ADLS_ACUITY_SCORE: 19

## 2018-06-15 NOTE — PLAN OF CARE
Problem: Patient Care Overview  Goal: Plan of Care/Patient Progress Review  A&O. Forgetful. VSS on RA. Tmax overnight 99.1. Up with 1 assist to BR. Dressing intact. CMS intact. Unable to void overnight. Ingram reinserted for failing void trial. Spinal precautions reinforced. Declined pain medications overnight despite expressing some discomfort.
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
## 2018-06-15 NOTE — PROVIDER NOTIFICATION
Paged Dr. Riley, scripts need to be signed, klonopin, Oxycodone. at [facility] desk. Thx.

## 2018-06-15 NOTE — PLAN OF CARE
Problem: Patient Care Overview  Goal: Plan of Care/Patient Progress Review  PT:  Discharge Planner PT   Patient plan for discharge: TCU  Current status: Patient seen for physical therapy this afternoon and agreeable to ambulation. When getting out of bed patient attempting to complete log-rolling technique from lying prone. Patient re-educated on proper log rolling technique and able to complete correctly with consistent cues for hand placement and safety and CGA. HOB at 30 degrees. Patient ambulated 150 feet with FWW and heavy bilateral UE use on the walker. Patient with slow gait pattern with path deviations needing assistance to correct. Patient wished to sit down after 150 feet and did not want to walk anymore during session. Patient noted to participate well during session with very consistent and specific cues from therapist.   Barriers to return to prior living situation: current level of assist, impaired cognition, decreased mobility with ambulation, safety  Recommendations for discharge: TCU   Rationale for recommendations: Patient needing consistent cues for safety and to perform bed mobility with correct log rolling technique. Patient progressing ambulation distance with use of FWW but would benefit from continued physical therapy to improve independence with gait, mobility, and transfers.       Entered by: Gabby Anne 06/15/2018 2:23 PM

## 2018-06-15 NOTE — PROGRESS NOTES
Patient is on room air and SpO2 low to high 90`s. Scheduled neb tx given and tolerated well. BBS clear/diminished. Aeration improved post neb tx. Will continue to follow.

## 2018-06-15 NOTE — PROGRESS NOTES
[facility]    Neurosurgery Progress Note    Date of Service (when I saw the patient): 06/15/2018     Assessment & Plan   Annika Garrido is a 52 year old female who was admitted on 6/10/2018. The pt presented with  severe back and leg pain, urinary retention, saddle anesthesias, and loss of rectal tone.  MRI showed very large L5-S1 disc herniation with extensive migration, critical stenosis, and loss of disc height. The pt underwent a L5-S1 fusion with Dr. Jeison Quinn on 6-. Today pt is up ambulating in the halls with a walker and PT.  Per nursing she has been getting up without calling at times.  It is recommended that she go to TCU. We discussed this today and she is open to doing so.  She notes some incisional pain today. Her staples are intact and open to air.  Plan to DC to TCU when placement found.      Active Problems:    Cauda equina compression (H)    Assessment: post fusion     Plan: Return to clinic for staple removal in 10-14 days    DC to TCU    Per hospital service       S/P lumbar fusion    Assessment: stable     Plan: as above      I have discussed the following assessment and plan Dr. Jeison Quinn  who is in agreement with initial plan and will follow up with further consultation recommendations.    [facility]  [facility]  [place] [id number]    Tel [phone]  Pager [phone]      Interval History   Stable     Physical Exam   Temp: 98.7  F (37.1  C) Temp src: Oral BP: 139/85   Heart Rate: 86 Resp: 16 SpO2: 94 % O2 Device: None (Room air)    Vitals:    06/10/18 0740   Weight: 182 lb (82.6 kg)     Vital Signs with Ranges  Temp:  [98.6  F (37  C)-99.1  F (37.3  C)] 98.7  F (37.1  C)  Heart Rate:  [61-86] 86  Resp:  [16] 16  BP: (104-139)/(56-85) 139/85  SpO2:  [93 %-100 %] 94 %  I/O last 3 completed shifts:  In: 1440 [P.O.:920; I.V.:520]  Out: 1700 [Urine:1700]    Heart Rate: 86, Blood pressure  "139/85, pulse 90, temperature 98.7  F (37.1  C), temperature source Oral, resp. rate 16, height 5' 5\" (1.651 m), weight 182 lb (82.6 kg), SpO2 94 %.  182 lbs 0 oz  HEENT:  Normocephalic, atraumatic.  PERRLA.  EOM s intact.    Neck:  Supple, non-tender, without lymphadenopathy.  Heart:  No peripheral edema  Lungs:  No SOB  Abdomen:  Soft, non-tender, non-distended.   Skin:  Warm and dry, good capillary refill. [place] intact and incision open to air  Extremities:  Good radial and dorsalis pedis pulses bilaterally, no edema, cyanosis or clubbing.    NEUROLOGICAL EXAMINATION:     Mental status:  Alert and Oriented x 3, speech is fluent.  Cranial nerves:  II-XII intact.   Motor:  Strength is 5/5 throughout the upper and lower extremities  Shoulder Abduction:  Right:  5/5   Left:  5/5  Biceps:                      Right:  5/5   Left:  5/5  Triceps:                     Right:  5/5   Left:  5/5  Wrist Extensors:       Right:  5/5   Left:  5/5  Wrist Flexors:           Right:  5/5   Left:  5/5  interosseus :            Right:  5/5   Left:  5/5   Hip Flexor:                Right: 5/5  Left:  5/5  Hip Adductor:             Right:  5/5  Left:  5/5  Hip Abductor:             Right:  5/5  Left:  5/5  Gastroc Soleus:        Right:  5/5  Left:  5/5  Tib/Ant:                      Right:  5/5  Left:  5/5    Sensation:  intact    Gait:  Unsteady gait with walker.     Medications       albuterol  2.5 mg Nebulization 4x daily     clonazePAM (klonoPIN) tablet 1 mg  1 mg Oral QAM     clonazePAM (klonoPIN) tablet 1 mg  1 mg Oral At Bedtime     dolutegravir  50 mg Oral Daily     emtricitabine-tenofovir  1 tablet Oral Daily     QUEtiapine (SEROquel) tablet 200 mg  200 mg Oral At Bedtime     senna-docusate  1 tablet Oral BID    Or     senna-docusate  2 tablet Oral BID     sertraline (ZOLOFT) tablet 100 mg  100 mg Oral Daily     sodium chloride (PF)  3 mL Intracatheter Q8H       Data     All new lab and imaging data was personally reviewed by " me.  CBC RESULTS:   Recent Labs   Lab Test  06/14/18   0847   WBC  9.0   RBC  3.84   HGB  10.8*   HCT  31.9*   MCV  83   MCH  28.1   MCHC  33.9   RDW  13.1   PLT  198     Basic Metabolic Panel:  Lab Results   Component Value Date     06/15/2018      Lab Results   Component Value Date    POTASSIUM 4.2 06/15/2018     Lab Results   Component Value Date    CHLORIDE 105 06/15/2018     Lab Results   Component Value Date    MANOLO 8.7 06/15/2018     Lab Results   Component Value Date    CO2 26 06/15/2018     Lab Results   Component Value Date    BUN 12 06/15/2018     Lab Results   Component Value Date    CR 0.64 06/15/2018     Lab Results   Component Value Date    GLC 97 06/15/2018     INR:  No results found for: INR

## 2018-06-15 NOTE — PLAN OF CARE
Problem: Patient Care Overview  Goal: Plan of Care/Patient Progress Review  Outcome: No Change  POD 5 L5-S1 fusion. A&O x4, forgetful. Afebrile, VSS, on RA. LS diminished, clear. PRN Oxycodone and Tylenol given for pain, relief. Incision [place], approximated, staples intact. Up A1 w/ GB and walker. Spine precaution reinforced. Reg diet, good appetite. Ingram patent, retention, adequate output. +BS, passing flatus, no BM this shift. Plans to d/c to TCU today, pending.     1500 - 1900 no change in assessments. Nicotine lozenge given.  No discharge today d/t SW finding placement. Nrsg will continue to monitor.
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
## 2018-06-15 NOTE — PLAN OF CARE
Problem: Patient Care Overview  Goal: Plan of Care/Patient Progress Review  Outcome: Improving  POD 4 L5-S1 fusion. Alert and oriented x4. Forgetful, but more alert today. VSS, no elevated temps today. Up to chair x2 and walked in the hallway with therapy. CMS intact. Incision WDL with intact staples, dressing changed. Lung sounds clear. +BS, small incontinence. Ingram d/yesenia, due to void. Reports high pain levels, but appears to be resting between cares and declined pain meds several times. D/c to TCU vs home.

## 2018-06-15 NOTE — PLAN OF CARE
Problem: Patient Care Overview  Goal: Plan of Care/Patient Progress Review  Discharge Planner OT   Patient plan for discharge: would like to go home, but agrees to TCU  Current status: supine <> sit with CGA and cues for tech, pt educated in spine precautions and back safety handout. Pt able to doff/edgar socks with AE with SBA/MIN A and cues for tech. Pt reports catheter discomfort and wanting to lay back down.   Barriers to return to prior living situation: spine precautions, decreased safety/confusion, balance, activity tolerance and strength.   Recommendations for discharge: TCU  Rationale for recommendations: daily therapy to increase ADL/IADL and functional mobility independence and safety to PLOF.        Entered by: Francisca Gómez 06/15/2018 2:52 PM

## 2018-06-15 NOTE — PROGRESS NOTES
[facility]    Infectious Disease Progress Note    Date of Service (when I saw the patient): 06/15/2018     Assessment & Plan   Annika Garrido is a 52 year old female who was admitted on 6/10/2018.     Impression:  1. 52 y.o female with long standing history of HIV.   2. Has been on multiple medications in past followed by [facility] and most recently at the [facility] care center at [facility].   3. Current HIV regimen is Tvicay and truvada, most recent CD4 count is 140.   4. Qunat TB negative in 2017, RPR negative in a recent office visit.   5. Per patient she has missed a total of 4-5 pills last month, per  there has been more than that.   6. Presented now with: L5-S1 disc herniation, stenosis, and cauda equina syndrome, S/P:   1.  L5-S1 insertion of bilateral pedicle screws and rods  2.  L5-S1 bilateral laminectomies, right medial facetectomy, left complete facetectomy, and bilateral evacuation of disc herniation for decompression of stenosis  3.  Left L5-S1 complete facetectomy, transforaminal discectomy, and interbody arthrodesis  4.  L5-S1 insertion of Patricia Tritanium intervertebral graft with Vitoss allograft     Recommendations:   Fevers improved, UA still negative, BC negative.   Viral Load is undetectable, compliance has been an issue per . Most recent CD4 is 140. Back on Tivicay and Truvada.   Quant TB negative, RPR negative, TPPA positive ( known history of treated in past syphilis)  CMV negative.            MD Janki Charles MD    Interval History   Temp better   Mentation improved      Physical Exam   Temp: 98.7  F (37.1  C) Temp src: Oral BP: 139/85   Heart Rate: 86 Resp: 16 SpO2: 94 % O2 Device: None (Room air)    Vitals:    06/10/18 0740   Weight: 82.6 kg (182 lb)     Vital Signs with Ranges  Temp:  [98.6  F (37  C)-99.1  F (37.3  C)] 98.7  F (37.1  C)  Heart Rate:  [61-89] 86  Resp:  [16] 16  BP: (104-139)/(56-85) 139/85  SpO2:  [93 %-100 %] 94  %    Constitutional: mentation improved   Lungs: Clear to auscultation bilaterally, no crackles or wheezing  Cardiovascular: Regular rate and rhythm, normal S1 and S2, and no murmur noted  Abdomen: Normal bowel sounds, soft, non-distended, non-tender  Skin: No rashes, no cyanosis, no edema  Other:    Medications       albuterol  2.5 mg Nebulization 4x daily     clonazePAM (klonoPIN) tablet 1 mg  1 mg Oral QAM     clonazePAM (klonoPIN) tablet 1 mg  1 mg Oral At Bedtime     dolutegravir  50 mg Oral Daily     emtricitabine-tenofovir  1 tablet Oral Daily     QUEtiapine (SEROquel) tablet 200 mg  200 mg Oral At Bedtime     senna-docusate  1 tablet Oral BID    Or     senna-docusate  2 tablet Oral BID     sertraline (ZOLOFT) tablet 100 mg  100 mg Oral Daily     sodium chloride (PF)  3 mL Intracatheter Q8H       Data   All microbiology laboratory data reviewed.  Recent Labs   Lab Test  06/14/18   0847  06/13/18   0915  06/12/18   0759   WBC  9.0  8.7  6.7   HGB  10.8*  10.4*  10.5*   HCT  31.9*  30.5*  31.1*   MCV  83  83  83   PLT  198  155  158     Recent Labs   Lab Test  06/15/18   0805  06/14/18   0847  06/13/18   0915   CR  0.64  0.66  0.67     No lab results found.  Recent Labs   Lab Test  06/14/18   0930  06/14/18   0920  06/13/18   0948  06/13/18   0915   CULT  No growth after 17 hours  No growth after 17 hours  No growth  No growth after 2 days

## 2018-06-15 NOTE — PLAN OF CARE
Problem: Patient Care Overview  Goal: Plan of Care/Patient Progress Review  PT:  Discharge Planner PT   Patient plan for discharge: Home with PCA  Current status: Patient ambulated 140 feet with FWW and CGA and w/c follow. Patient noted to utilize heavy bilateral UE use on FWW during gait with slow and slightly shuffling gait pattern. Patient re-educated on proper log rolling technique for supine to sit transfer. Patient initially trying to roll on to stomach to initiate transfer, cues provided and patient re-educated. Patient able to complete transfer properly with HOB at 30 degrees. Patient able to complete sit to stand transfers throughout session with Min A and consistent cues for proper hand placement during transfer for safety. Patient noted to have increased posterior lean during transfers requiring cues to correct.    Barriers to return to prior living situation: current level of assist, impaired cognition, decreased safety, falls risk   Recommendations for discharge: TCU but if patient refusing would require 24 hour supervision and assistance at home   Rationale for recommendations: Patient continues to be limited in independence with mobility and ambulation. Patient requiring cues for safety and proper techniques for transfer and bed mobility. Would benefit from continued physical therapy to help improve independence with ambulation, transfers, gait, and overall mobility.        Entered by: Gabby Anne 06/15/2018 9:25 AM

## 2018-06-16 ENCOUNTER — APPOINTMENT (OUTPATIENT)
Dept: PHYSICAL THERAPY | Facility: CLINIC | Age: 53
DRG: 459 | End: 2018-06-16
Payer: COMMERCIAL

## 2018-06-16 VITALS
DIASTOLIC BLOOD PRESSURE: 62 MMHG | SYSTOLIC BLOOD PRESSURE: 95 MMHG | BODY MASS INDEX: 30.32 KG/M2 | TEMPERATURE: 98.4 F | RESPIRATION RATE: 16 BRPM | OXYGEN SATURATION: 96 % | HEIGHT: 65 IN | WEIGHT: 182 LBS | HEART RATE: 90 BPM

## 2018-06-16 PROCEDURE — 97530 THERAPEUTIC ACTIVITIES: CPT | Mod: GP | Performed by: PHYSICAL THERAPY ASSISTANT

## 2018-06-16 PROCEDURE — 40000275 ZZH STATISTIC RCP TIME EA 10 MIN

## 2018-06-16 PROCEDURE — 25000132 ZZH RX MED GY IP 250 OP 250 PS 637: Performed by: INTERNAL MEDICINE

## 2018-06-16 PROCEDURE — 25000132 ZZH RX MED GY IP 250 OP 250 PS 637: Performed by: NEUROLOGICAL SURGERY

## 2018-06-16 PROCEDURE — 25000132 ZZH RX MED GY IP 250 OP 250 PS 637: Performed by: PHYSICIAN ASSISTANT

## 2018-06-16 PROCEDURE — 97116 GAIT TRAINING THERAPY: CPT | Mod: GP | Performed by: PHYSICAL THERAPY ASSISTANT

## 2018-06-16 PROCEDURE — 94640 AIRWAY INHALATION TREATMENT: CPT

## 2018-06-16 PROCEDURE — 94640 AIRWAY INHALATION TREATMENT: CPT | Mod: 76

## 2018-06-16 PROCEDURE — 99238 HOSP IP/OBS DSCHRG MGMT 30/<: CPT | Performed by: INTERNAL MEDICINE

## 2018-06-16 PROCEDURE — 25000132 ZZH RX MED GY IP 250 OP 250 PS 637: Performed by: PSYCHIATRY & NEUROLOGY

## 2018-06-16 PROCEDURE — 25000125 ZZHC RX 250: Performed by: INTERNAL MEDICINE

## 2018-06-16 PROCEDURE — 40000193 ZZH STATISTIC PT WARD VISIT: Performed by: PHYSICAL THERAPY ASSISTANT

## 2018-06-16 RX ADMIN — ACETAMINOPHEN 650 MG: 325 TABLET, FILM COATED ORAL at 08:08

## 2018-06-16 RX ADMIN — ACETAMINOPHEN 650 MG: 325 TABLET, FILM COATED ORAL at 12:44

## 2018-06-16 RX ADMIN — Medication 2.5 MG: at 12:44

## 2018-06-16 RX ADMIN — ALBUTEROL SULFATE 2.5 MG: 2.5 SOLUTION RESPIRATORY (INHALATION) at 11:25

## 2018-06-16 RX ADMIN — SERTRALINE HYDROCHLORIDE 100 MG: 100 TABLET ORAL at 08:08

## 2018-06-16 RX ADMIN — DOLUTEGRAVIR SODIUM 50 MG: 50 TABLET, FILM COATED ORAL at 08:08

## 2018-06-16 RX ADMIN — POLYETHYLENE GLYCOL 3350 17 G: 17 POWDER, FOR SOLUTION ORAL at 12:44

## 2018-06-16 RX ADMIN — EMTRICITABINE AND TENOFOVIR DISOPROXIL FUMARATE 1 TABLET: 200; 300 TABLET, FILM COATED ORAL at 08:08

## 2018-06-16 RX ADMIN — ALBUTEROL SULFATE 2.5 MG: 2.5 SOLUTION RESPIRATORY (INHALATION) at 07:51

## 2018-06-16 RX ADMIN — Medication 2.5 MG: at 08:08

## 2018-06-16 RX ADMIN — SENNOSIDES AND DOCUSATE SODIUM 1 TABLET: 8.6; 5 TABLET ORAL at 08:08

## 2018-06-16 RX ADMIN — CLONAZEPAM 1 MG: 1 TABLET ORAL at 08:08

## 2018-06-16 ASSESSMENT — ACTIVITIES OF DAILY LIVING (ADL)
ADLS_ACUITY_SCORE: 19

## 2018-06-16 NOTE — PLAN OF CARE
Problem: Patient Care Overview  Goal: Plan of Care/Patient Progress Review  Outcome: No Change  A&O, forgetful. CMS intact. BLE weakness. VSS. Incision KALPANA, CDI. Regular diet. Up with A1, gb, and walker. Denies pain. Needs reminders to keep body mechanics. Ingram patent. Discharge to TCU pending.

## 2018-06-16 NOTE — PROGRESS NOTES
"Social Work Progress Note  Pt chart reviewed. Pt discussed in interdisciplinary rounds.     Intervention: Per Previous SW report, pt was declined at [facility]. Per staff at [facility], admissions is out of office this weekend. Voice message left for [facility], and [facility]. Writer met with pt and updated her of the above. Writer asked pt for more choices and provided her with SNF list. Pt stated \"I don't care, you can send me wherever\". Pt's spouse was present. Writer faxed additional referrals to; [facility], [facility], [facility], [facility], [facility], [facility], [facility] , and [facility]  in [place].     Team members notified: Bedside RN, Charge RN    Plan:  Anticipated Discharge Placement: TCU    Follow-up plan: Sw to continue to follow.     NICCI Feldman LGSW  [phone]   11:06 AM    ADDENDUM:   Pt was accepted at [facility]. Writer faxed d/c orders, scripts, and PAS-R. Pt will be transported by spouse around 1330.     PAS-RR    D: Per DHS regulation, SW completed and submitted PAS-RR to [place] [employer] Direct Connect via the Senior [employer] Line.  PAS-RR confirmation # is : [id number]    P: Further questions may be directed to Senior [employer] Line at #[phone], option #4 for PAS-RR staff.    NICCI Feldman LGSW  [phone]  12:35 PM  "

## 2018-06-16 NOTE — PLAN OF CARE
Problem: Patient Care Overview  Goal: Plan of Care/Patient Progress Review  Discharge Planner PT   Patient plan for discharge: TCU  Current status: Pt performed bed mobility with min assist and cues for logrolling.  Min assist with sit to/from stand transfers.  Pt c/o feeling light-headed when standing initially and needed to sit back down.  Improved with rest.  Seated BP was 90/51. Pt performed gait training x 110 ft using wheeled walker and CGA-min assist for balance.  Pt is unsteady and amb with moderate path deviation.  Tends to bump into doorways and wall at times. Declined further gait due to fatigue.  Barriers to return to prior living situation: current level of assist, impaired cognition, decreased mobility with ambulation, safety  Recommendations for discharge: TCU per plan established by the PT.                 Rationale for recommendations: Patient needing consistent cues for safety and to perform bed mobility with correct log rolling technique. Patient progressing ambulation distance with use of FWW but would benefit from continued physical therapy to improve safety and independence with gait, mobility, and transfers.       Entered by: Francisca Marcus 06/16/2018 12:03 PM         Pt discharged to TCU today.  PT goals not met.

## 2018-06-16 NOTE — PROGRESS NOTES
[facility]    Neurosurgery  Daily Post-Op Note    Assessment & Plan   Procedure(s):  OPTICAL TRACKING SYSTEM FUSION SPINE POSTERIOR LUMBAR ONE LEVEL   -6 Days Post-Op  L5-S1 TLIF with Dr. Quinn 6/10/18.   Doing well now, no leg pain, no bladder issues. Pain is well controlled. Tolerating diet. Able to ambulate independently.     Plan:  -Advance activity as tolerated  -Continue supportive and symptomatic treatment  -TCU when bed available.       Eze Jarrett    Interval History   Stable.  Doing well.  Improving slowly.  Pain is reasonably controlled.  No fevers.     Physical Exam   Temp: 98.8  F (37.1  C) Temp src: Oral BP: 96/53   Heart Rate: 87 Resp: 16 SpO2: 98 % O2 Device: None (Room air)    Vitals:    06/10/18 0740   Weight: 182 lb (82.6 kg)     Vital Signs with Ranges  Temp:  [97.8  F (36.6  C)-98.8  F (37.1  C)] 98.8  F (37.1  C)  Heart Rate:  [68-87] 87  Resp:  [16] 16  BP: ()/(53-84) 96/53  SpO2:  [96 %-98 %] 98 %  I/O last 3 completed shifts:  In: 1230 [P.O.:1230]  Out: 1550 [Urine:1550]    Alert and oriented.  Moves all extremities equally.      Incision: CDI, staples intact.       Medications        albuterol  2.5 mg Nebulization 4x daily     clonazePAM (klonoPIN) tablet 1 mg  1 mg Oral QAM     clonazePAM (klonoPIN) tablet 1 mg  1 mg Oral At Bedtime     dolutegravir  50 mg Oral Daily     emtricitabine-tenofovir  1 tablet Oral Daily     QUEtiapine (SEROquel) tablet 200 mg  200 mg Oral At Bedtime     senna-docusate  1 tablet Oral BID    Or     senna-docusate  2 tablet Oral BID     sertraline (ZOLOFT) tablet 100 mg  100 mg Oral Daily     sodium chloride (PF)  3 mL Intracatheter Q8H             Eze Jarrett PA-C  [facility]

## 2018-06-16 NOTE — DISCHARGE INSTRUCTIONS
Pt to d/c to [facility] via spouse at 1330.     [facility] at [facility]  Dr. Quinn Discharge Instructions Following Spine Surgery  [phone]  Monday - Friday; 8:00 AM - 4:00 PM    In General:   After you have had surgery on your spine, remember do not twist, or excessively flex or extend the area that you had surgery.  These activities can prevent healing.  Pain is normal and to be expected following surgery.  Please call our office to schedule your appointment follow up appointment.      Bowel Care:  Many people have constipation (hard stools) after surgery.  To help prevent constipation: Drink plenty of fluid (8-10 glasses/day); Eat more fiber, such as whole grain bread, bran cereal, and fruits and vegetables; Stay active by walking; Over the counter stool softener may also help.      Medications:  Spine surgery and pain management is unique to all patients.  You will generally be given medications for pain, muscle spasms or tightness, and for constipation during the immediate post op period.  It is important that you use these as prescribed.  Please remember to bring your pill bottles to all of your appointments. Avoid alcoholic beverages while taking narcotic pain medications. You can use ice to areas of pain as needed, 20 minutes at a time.  Changing positions and walking will help loosen your muscles as well.    Driving:  No driving while on narcotic pain medications.  It is state law not to drive while under the influence of a drug to a degree which renders you incapable of safely driving.  The narcotic medication you will be taking after surgery falls under this category.     Activity:   After surgery, most people feel less pain than they have had in a long time.  Walking and light activities will help you regain the use of your muscles.  You are encouraged to walk: start with short walks 5-10 minutes at a time for 4-5 times per day and increase as tolerated.   Stair climbing as tolerated, we recommend you use the railing. No lifting greater than 10 pounds: approximately equal to one gallon of milk. No twisting, bending in the area you have had surgery. No housework, vacuuming, laundry, leaf raking, lawn mowing, or snow removal. Wear your brace (if ordered) as directed.    Showers:  If you have sutures or staples you may shower two days after surgery. It is ok to let water run over your incision but do not touch or scrub on the incision. Pat dry immediately after showering. If there is a dressing in place, you may remove it 2 days after surgery. If you were closed with Derma kramer (glue), you may shower without covering the incision. No baths, hot tubs, or pool activity for at least 6 weeks.     Nutrition:  In general, your diet restrictions will not change with your surgery.  You may need to eat small frequent meals initially until your appetite returns.  Eat plenty of high fiber foods and drink plenty of fluids. If you do not have a fluid restriction from or prior to surgery, we recommend 6-8 (8oz) glasses of water per day. Other fluids are fine, but water is best. Nausea is not uncommon; it is a common side effect to many pain medications.  We recommend that you take the pain medications with food, if this does not improve your symptoms, please call us.     Smoking:  For proper healing it is required that you quit using all tobacco products.  This includes smoking, chewing, nicotine gums, and nicotine patches.  Call Dr. Quinn if these occur: Drainage from your incision, increased pain/redness/swelling, temperatures greater than 101.5, increased leg pain or swelling or unrelieved headaches    Go to the nearest Emergency Room if you experience: chest pain, shortness of breath, neck swelling or swallowing problems
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
## 2018-06-16 NOTE — PLAN OF CARE
Problem: Patient Care Overview  Goal: Plan of Care/Patient Progress Review  Outcome: No Change  POD 6 L5-S1 fusion. A&O x4, forgetful. VSS, on RA. LS clear. BLE weakness, denies numbness tingling, CMS intact. PRN Oxycodone and Tylenol given for pain, relief. Incision KALPANA, approximated, staples intact. Up A1 w/ GB and walker. Body mechanics and spine precaution reinforced. Reg diet, good appetite. Ingram patent, retention, adequate output. +BS, passing flatus, senna and Miralax given, no BM.  Discharging to TCU today at 1330, spouse providing transportation. Discharge follow-up care given. Security deposit of cash returned to patient.

## 2018-06-17 NOTE — PROGRESS NOTES
Called by RN.  Pharmacy at [phone] regarding quantity of klonopin at discharge.  Sig is for 1 tab in am and pm.  Quantity would be 60. Message left at number above on doctor line of pharmacy to return call as no one answered.    GOYO [place]

## 2018-06-19 LAB
BACTERIA SPEC CULT: NO GROWTH
Lab: NORMAL
SPECIMEN SOURCE: NORMAL

## 2018-06-20 LAB
BACTERIA SPEC CULT: NO GROWTH
BACTERIA SPEC CULT: NO GROWTH
Lab: NORMAL
Lab: NORMAL
SPECIMEN SOURCE: NORMAL
SPECIMEN SOURCE: NORMAL

## 2018-06-22 ENCOUNTER — OFFICE VISIT (OUTPATIENT)
Dept: NEUROSURGERY | Facility: CLINIC | Age: 53
End: 2018-06-22
Attending: PHYSICIAN ASSISTANT
Payer: COMMERCIAL

## 2018-06-22 VITALS
SYSTOLIC BLOOD PRESSURE: 109 MMHG | DIASTOLIC BLOOD PRESSURE: 71 MMHG | TEMPERATURE: 97.6 F | WEIGHT: 177 LBS | HEART RATE: 87 BPM | BODY MASS INDEX: 29.45 KG/M2

## 2018-06-22 DIAGNOSIS — Z98.1 S/P LUMBAR FUSION: Primary | ICD-10-CM

## 2018-06-22 PROCEDURE — 40000269 ZZH STATISTIC NO CHARGE FACILITY FEE: Performed by: PHYSICIAN ASSISTANT

## 2018-06-22 RX ORDER — OXYCODONE HYDROCHLORIDE 5 MG/1
5 TABLET ORAL EVERY 8 HOURS PRN
Qty: 20 TABLET | Refills: 0 | Status: SHIPPED | OUTPATIENT
Start: 2018-06-22 | End: 2018-07-25

## 2018-06-22 ASSESSMENT — PAIN SCALES - GENERAL: PAINLEVEL: EXTREME PAIN (9)

## 2018-06-22 NOTE — LETTER
6/22/2018         RE: Annika Garrido  [address] [facility] [address]        Dear Colleague,    Thank you for referring your patient, Annika Garrido, to the [facility] NEUROSURGERY [facility]INIC. Please see a copy of my visit note below.    Suture/Staple removal visit note:    S:  Patient is s/p L5-S1 TLIF on 6/16/18 with Dr. Quinn. Recently discharged home from rehab unit. Presented to clinic today with granddaughter. Patient reports moderate low back pain today. Reviewed her questions regarding post op recovery and healing.   O: Staple removed today. Wound is healing well without erythema, fluctuation, induration, drainage, or fever.  A: Patient returned to clinic post-op for staple removal.  Patient tolerated procedure without incident.   P:    - Keep your incision clean and dry at all times. Steri strips should remain in place for 7-10 days and will fall off naturally. No bathing, swimming, or submerging in water until incision is well healed.  Call clinic or go to Emergency Room if you develop any new pain, drainage, swelling, or fever.    -Roni TALBOT provided 20 tabs oxycodone to help with pain management. Discussed weaning process with patient.    - Return to clinic as scheduled follow up appointment and xray    - No lifting greater than 10-15 pounds. No bending, twisting, or overhead reaching.    Yoly Lira RN  [facility]  [address] [address]    Tel [phone]        Again, thank you for allowing me to participate in the care of your patient.        Sincerely,         Neurosurgery Nurse

## 2018-06-22 NOTE — MR AVS SNAPSHOT
After Visit Summary   6/22/2018    Annika Garrido    MRN: [id number]           Patient Information     Date Of Birth          1965        Visit Information        Provider Department      6/22/2018 11:15 AM Nurse, [facility]        Today's Diagnoses     S/P lumbar fusion    -  1      Care Instructions    - Keep your incision clean and dry at all times. Steri strips should remain in place for 7-10 days, then will fall off naturally. No bathing, swimming, or submerging in water until incision is well healed.  Call clinic or go to Emergency Room if you develop any new pain, drainage, swelling, or fever.    - Return to clinic as scheduled for follow up appointment and xray    - No lifting greater than 10-15 pounds. No bending, twisting, or overhead reaching.          Follow-ups after your visit        Your next 10 appointments already scheduled     Jul 23, 2018 10:30 AM CDT   XR LUMBAR SPINE 2/3 VIEWS with CSXR[facility]   [facility]    [address] [place] [address] [id number] [phone]           Please bring a list of your current medicines to your exam. (Include vitamins, minerals and over-thecounter medicines.) Leave your valuables at home.  Tell your doctor if there is a chance you may be pregnant.  You do not need to do anything special for this exam.            Jul 23, 2018 11:10 AM CDT   Return Visit with Hien Tran PA-C   [facility] ([facility])    [address] [place] [id number] [phone]              Who to contact     If you have questions or need follow up information about today's clinic visit or your schedule please contact [facility] NEUROSURGERY [facility] directly at [phone].  Normal or non-critical lab and imaging results will be communicated to you by MyChart, letter or phone within 4 business days after the clinic has  "received the results. If you do not hear from us within 7 days, please contact the clinic through 51edj or phone. If you have a critical or abnormal lab result, we will notify you by phone as soon as possible.  Submit refill requests through 51edj or call your pharmacy and they will forward the refill request to us. Please allow 3 business days for your refill to be completed.          Additional Information About Your Visit        51edj Information     51edj lets you send messages to your doctor, view your test results, renew your prescriptions, schedule appointments and more. To sign up, go to [email or web address] . Click on \"Log in\" on the left side of the screen, which will take you to the Welcome page. Then click on \"Sign up Now\" on the right side of the page.     You will be asked to enter the access code listed below, as well as some personal information. Please follow the directions to create your username and password.     Your access code is: [email or web address]  Expires: 2018 12:17 PM     Your access code will  in 90 days. If you need help or a new code, please call your [facility] clinic or [phone].        Care EveryWhere ID     This is your Care EveryWhere ID. This could be used by other organizations to access your [facility] medical records  [id number]        Your Vitals Were     Pulse Temperature Last Period BMI (Body Mass Index)          87 97.6  F (36.4  C) (LMP Unknown) 29.45 kg/m2         Blood Pressure from Last 3 Encounters:   18 109/71   18 95/62   10/09/17 (!) 148/91    Weight from Last 3 Encounters:   18 177 lb (80.3 kg)   06/10/18 182 lb (82.6 kg)              Today, you had the following     No orders found for display       Primary Care Provider Office Phone # Fax #    [facility] [phone]       [address] [facility] [address]        Equal Access to Services     CASS HERNANDEZ AH: Cici José, " waaxda luqadaha, qaybta kaalkaren espinoza, sana herreraaan ah. So [facility] [phone].    ATENCIÓN: Si kyle gordon, tiene a spann disposición servicios gratuitos de asistencia lingüística. Rajan al [phone].    We comply with applicable federal civil rights laws and [place] laws. We do not discriminate on the basis of race, color, national origin, age, disability, sex, sexual orientation, or gender identity.            Thank you!     Thank you for choosing [facility] NEUROSURGERY [facility]  for your care. Our goal is always to provide you with excellent care. Hearing back from our patients is one way we can continue to improve our services. Please take a few minutes to complete the written survey that you may receive in the mail after your visit with us. Thank you!             Your Updated Medication List - Protect others around you: Learn how to safely use, store and throw away your medicines at www.disposemymeds.org.          This list is accurate as of 6/22/18 11:27 AM.  Always use your most recent med list.                   Brand Name Dispense Instructions for use Diagnosis    albuterol 108 (90 Base) MCG/ACT Inhaler    PROAIR HFA/PROVENTIL HFA/VENTOLIN HFA     Inhale 1-2 puffs into the lungs every 4 hours as needed for shortness of breath / dyspnea or wheezing        ciclopirox 8 % Soln      Apply to adjacent skin and affected nails daily.  Remove with alcohol every 7 days, then repeat.        clonazePAM 1 MG tablet    klonoPIN     Take 1 tablet (1 mg) by mouth in the morning and at bedtime    Anxiety disorder, unspecified type       clotrimazole 1 % cream    LOTRIMIN     Apply topically 2 times daily To the affected toe.        dolutegravir 50 MG tablet    TIVICAY     Take 50 mg by mouth daily        emtricitabine-tenofovir 200-300 MG per tablet    TRUVADA     Take 1 tablet by mouth daily        ketoconazole 2 % shampoo    NIZORAL     Apply topically daily as needed for itching  or irritation        nicotine polacrilex 2 MG lozenge    COMMIT    360 lozenge    Place 1 lozenge (2 mg) inside cheek every hour as needed for smoking cessation    Tobacco use disorder       oxyCODONE IR 5 MG tablet    ROXICODONE    6 tablet    Take 0.5 tablets (2.5 mg) by mouth every 6 hours as needed for other (pain control or improvement in physical function. Hold dose for analgesic side effects.)    S/P lumbar fusion       polyethylene glycol Packet    MIRALAX/GLYCOLAX    7 packet    Take 17 g by mouth daily as needed for constipation    S/P lumbar fusion       QUETIAPINE FUMARATE PO      Take 200 mg by mouth At Bedtime        SERTRALINE HCL PO      Take 100 mg by mouth daily

## 2018-06-22 NOTE — PATIENT INSTRUCTIONS
- Keep your incision clean and dry at all times. Steri strips should remain in place for 7-10 days, then will fall off naturally. No bathing, swimming, or submerging in water until incision is well healed.  Call clinic or go to Emergency Room if you develop any new pain, drainage, swelling, fever, weakness, or neurological changes.     -Oxycodone refilled, 20 tabs. Continue to taper off.     - Return to clinic as scheduled for follow up appointment and xray    - No lifting greater than 10-15 pounds. No bending, twisting, or overhead reaching.

## 2018-06-22 NOTE — PROGRESS NOTES
Suture/Staple removal visit note:    S:  Patient is s/p L5-S1 TLIF on 6/16/18 with Dr. Quinn. Recently discharged home from rehab unit. Presented to clinic today with granddaughter. Patient reports moderate low back pain today. Reviewed her questions regarding post op recovery and healing.   O: Staple removed today. Wound is healing well without erythema, fluctuation, induration, drainage, or fever.  A: Patient returned to clinic post-op for staple removal.  Patient tolerated procedure without incident.   P:    - Keep your incision clean and dry at all times. Steri strips should remain in place for 7-10 days and will fall off naturally. No bathing, swimming, or submerging in water until incision is well healed.  Call clinic or go to Emergency Room if you develop any new pain, drainage, swelling, fever, weakness, or neurological changes.     -Roni TALBOT provided 20 tabs oxycodone to help with pain management. Discussed weaning process with patient.    - Return to clinic as scheduled follow up appointment and xray    - No lifting greater than 10-15 pounds. No bending, twisting, or overhead reaching.    Yoly Lira RN  [facility]  [address] [address]    Tel [phone]

## 2018-07-23 ENCOUNTER — RADIANT APPOINTMENT (OUTPATIENT)
Dept: GENERAL RADIOLOGY | Facility: CLINIC | Age: 53
End: 2018-07-23
Attending: PHYSICIAN ASSISTANT
Payer: COMMERCIAL

## 2018-07-23 DIAGNOSIS — Z98.1 S/P LUMBAR FUSION: ICD-10-CM

## 2018-07-23 PROCEDURE — 72100 X-RAY EXAM L-S SPINE 2/3 VWS: CPT

## 2018-07-25 ENCOUNTER — OFFICE VISIT (OUTPATIENT)
Dept: NEUROSURGERY | Facility: CLINIC | Age: 53
End: 2018-07-25
Attending: PHYSICIAN ASSISTANT
Payer: COMMERCIAL

## 2018-07-25 VITALS
HEART RATE: 72 BPM | TEMPERATURE: 97.7 F | OXYGEN SATURATION: 100 % | DIASTOLIC BLOOD PRESSURE: 97 MMHG | SYSTOLIC BLOOD PRESSURE: 132 MMHG

## 2018-07-25 DIAGNOSIS — Z98.1 S/P LUMBAR FUSION: Primary | ICD-10-CM

## 2018-07-25 PROCEDURE — 99024 POSTOP FOLLOW-UP VISIT: CPT | Performed by: PHYSICIAN ASSISTANT

## 2018-07-25 PROCEDURE — G0463 HOSPITAL OUTPT CLINIC VISIT: HCPCS

## 2018-07-25 RX ORDER — CYCLOBENZAPRINE HCL 10 MG
5-10 TABLET ORAL 3 TIMES DAILY PRN
Qty: 50 TABLET | Refills: 1 | Status: SHIPPED | OUTPATIENT
Start: 2018-07-25 | End: 2018-12-12

## 2018-07-25 RX ORDER — OXYCODONE HYDROCHLORIDE 5 MG/1
5 TABLET ORAL EVERY 8 HOURS PRN
Qty: 30 TABLET | Refills: 0 | Status: SHIPPED | OUTPATIENT
Start: 2018-07-25 | End: 2020-05-06

## 2018-07-25 ASSESSMENT — PAIN SCALES - GENERAL: PAINLEVEL: WORST PAIN (10)

## 2018-07-25 NOTE — NURSING NOTE
"Annika Garrido is a 53 year old female who presents for:  Chief Complaint   Patient presents with     Neurologic Problem     s/p lumbar fusion 6-10-18        Vitals:    There were no vitals filed for this visit.    BMI:  Estimated body mass index is 29.45 kg/(m^2) as calculated from the following:    Height as of 6/10/18: 5' 5\" (1.651 m).    Weight as of 6/22/18: 177 lb (80.3 kg).    Pain Score:  Data Unavailable        Elo Holt CMA, AAS      "

## 2018-07-25 NOTE — LETTER
"    7/25/2018         RE: Annika Garrido  [address] [facility] [address]        Dear Colleague,    Thank you for referring your patient, Annika Garrido, to the [facility] NEUROSURGERY CLINIC. Please see a copy of my visit note below.    [facility]  Neurosurgery followup:    HPI: 6 weeks s/p L5-S1 TLIF. Continues to recover post operatively. States she does have some ongoing numbness in left leg and a \"charley horse\" sensation in left calf and bilateral low back. She has been walking with walker, which has been going well. Denies weakness.  Exam:  Constitutional:  Alert, well nourished, NAD.  HEENT: Normocephalic, atraumatic.   Pulm:  Without shortness of breath   CV:  No pitting edema of BLE.      Neurological:  Awake  Alert  Oriented x 3  Motor exam:        IP Q DF PF EHL  R   5  5   5   5    5  L   5  5   5   5    5     Reflexes are 2+ in the patellar and Achilles. There is no clonus. Downgoing Babinski.    Able to spontaneously move L/E bilaterally  Sensation intact throughout all L/E dermatomes     Incision: Healing nicely.  Imaging: AP and lateral lumbar films reveal stable hardware.  A/P: 6 weeks s/p L5-S1 TLIF. Continues to recover post operatively. XRs with stable hardware. Incision nicely healed. Full strength. I did discuss I will refill her oxy at this time but she should continue to wean at this point. I have also prescribed her flexeril to help with her muscle spasms. Will have her return in 6 weeks with xray prior. Patient voiced understanding and agreement.  - May increase lifting restriction to 20 pounds   - followup in 6 weeks with Dr. Quinn with xray prior   - Call the clinic at [phone] for increased pain or any other questions and concerns.      Hien rTan PA-C  [facility]  [address]  [address] [place] [address]    Tel [phone]  Pager [phone]      Again, thank you for allowing me to participate in " the care of your patient.        Sincerely,        Hien Tran PA-C

## 2018-07-25 NOTE — MR AVS SNAPSHOT
After Visit Summary   7/25/2018    Annika Garrido    MRN: [id number]           Patient Information     Date Of Birth          1965        Visit Information        Provider Department      7/25/2018 11:20 AM Hien Tran PA-C [facility] Neurosurgery [facility]        Today's Diagnoses     S/P lumbar fusion    -  1      Care Instructions    -Continue to wean off of oxycodone at this time  - May increase lifting restriction to 20 pounds   - followup in 6 weeks with Dr. Quinn with xray prior   - Call the clinic at [phone] for increased pain or any other questions and concerns.          Follow-ups after your visit        Future tests that were ordered for you today     Open Future Orders        Priority Expected Expires Ordered    XR Lumbar Spine 2/3 Views Routine 9/5/2018 7/25/2019 7/25/2018            Who to contact     If you have questions or need follow up information about today's clinic visit or your schedule please contact [facility] NEUROSURGERY [facility] directly at [phone].  Normal or non-critical lab and imaging results will be communicated to you by MyChart, letter or phone within 4 business days after the clinic has received the results. If you do not hear from us within 7 days, please contact the clinic through Ayi Laile[employer] or phone. If you have a critical or abnormal lab result, we will notify you by phone as soon as possible.  Submit refill requests through [employer] or call your pharmacy and they will forward the refill request to us. Please allow 3 business days for your refill to be completed.          Additional Information About Your Visit        Care EveryWhere ID     This is your Care EveryWhere ID. This could be used by other organizations to access your [facility] medical records  [id number]        Your Vitals Were     Pulse Temperature Last Period Pulse Oximetry Breastfeeding?       72 97.7  F (36.5  C) (Oral) (LMP Unknown) 100% No        Blood Pressure from  Last 3 Encounters:   07/25/18 (!) 132/97   06/22/18 109/71   06/16/18 95/62    Weight from Last 3 Encounters:   06/22/18 177 lb (80.3 kg)   06/10/18 182 lb (82.6 kg)                 Today's Medication Changes          These changes are accurate as of 7/25/18 11:39 AM.  If you have any questions, ask your nurse or doctor.               Start taking these medicines.        Dose/Directions    cyclobenzaprine 10 MG tablet   Commonly known as:  FLEXERIL   Used for:  S/P lumbar fusion   Started by:  Hien Tran PA-C        Dose:  5-10 mg   Take 0.5-1 tablets (5-10 mg) by mouth 3 times daily as needed for muscle spasms   Quantity:  50 tablet   Refills:  1            Where to get your medicines      These medications were sent to [facility] - [address]  [address], [place] [address]     Phone:  [phone]     cyclobenzaprine 10 MG tablet         Some of these will need a paper prescription and others can be bought over the counter.  Ask your nurse if you have questions.     Bring a paper prescription for each of these medications     oxyCODONE IR 5 MG tablet               Information about OPIOIDS     PRESCRIPTION OPIOIDS: WHAT YOU NEED TO KNOW   We gave you an opioid (narcotic) pain medicine. It is important to manage your pain, but opioids are not always the best choice. You should first try all the other options your care team gave you. Take this medicine for as short a time (and as few doses) as possible.     These medicines have risks:    DO NOT drive when on new or higher doses of pain medicine. These medicines can affect your alertness and reaction times, and you could be arrested for driving under the influence (DUI). If you need to use opioids long-term, talk to your care team about driving.    DO NOT operate heave machinery    DO NOT do any other dangerous activities while taking these medicines.     DO NOT drink any alcohol while taking these medicines.       If the opioid prescribed includes acetaminophen, DO NOT take with any other medicines that contain acetaminophen. Read all labels carefully. Look for the word  acetaminophen  or  Tylenol.  Ask your pharmacist if you have questions or are unsure.    You can get addicted to pain medicines, especially if you have a history of addiction (chemical, alcohol or substance dependence). Talk to your care team about ways to reduce this risk.    Store your pills in a secure place, locked if possible. We will not replace any lost or stolen medicine. If you don t finish your medicine, please throw away (dispose) as directed by your pharmacist. The [place] Pollution Control Agency has more information about safe disposal: https://www.pca.[facility].mn.us/living-green/managing-unwanted-medications.     All opioids tend to cause constipation. Drink plenty of water and eat foods that have a lot of fiber, such as fruits, vegetables, prune juice, apple juice and high-fiber cereal. Take a laxative (Miralax, milk of magnesia, Colace, Senna) if you don t move your bowels at least every other day.          Primary Care Provider Office Phone # Fax #    [facility] [phone]       [address] [place] [address]        Equal Access to Services     CASS HERNANDEZ : Cici José, wastewart mann, qaisiahta kaalmada alexis, sana gay. So [facility] [phone].    ATENCIÓN: Si habla español, tiene a spann disposición servicios gratuitos de asistencia lingüística. Jazmíname al [phone].    We comply with applicable federal civil rights laws and [place] laws. We do not discriminate on the basis of race, color, national origin, age, disability, sex, sexual orientation, or gender identity.            Thank you!     Thank you for choosing [facility] NEUROSURGERY [facility]  for your care. Our goal is always to provide you with excellent care. Hearing back from our  patients is one way we can continue to improve our services. Please take a few minutes to complete the written survey that you may receive in the mail after your visit with us. Thank you!             Your Updated Medication List - Protect others around you: Learn how to safely use, store and throw away your medicines at www.disposemymeds.org.          This list is accurate as of 7/25/18 11:39 AM.  Always use your most recent med list.                   Brand Name Dispense Instructions for use Diagnosis    albuterol 108 (90 Base) MCG/ACT Inhaler    PROAIR HFA/PROVENTIL HFA/VENTOLIN HFA     Inhale 1-2 puffs into the lungs every 4 hours as needed for shortness of breath / dyspnea or wheezing        ciclopirox 8 % Soln      Apply to adjacent skin and affected nails daily.  Remove with alcohol every 7 days, then repeat.        clonazePAM 1 MG tablet    klonoPIN     Take 1 tablet (1 mg) by mouth in the morning and at bedtime    Anxiety disorder, unspecified type       clotrimazole 1 % cream    LOTRIMIN     Apply topically 2 times daily To the affected toe.        cyclobenzaprine 10 MG tablet    FLEXERIL    50 tablet    Take 0.5-1 tablets (5-10 mg) by mouth 3 times daily as needed for muscle spasms    S/P lumbar fusion       dolutegravir 50 MG tablet    TIVICAY     Take 50 mg by mouth daily        emtricitabine-tenofovir 200-300 MG per tablet    TRUVADA     Take 1 tablet by mouth daily        ketoconazole 2 % shampoo    NIZORAL     Apply topically daily as needed for itching or irritation        nicotine polacrilex 2 MG lozenge    COMMIT    360 lozenge    Place 1 lozenge (2 mg) inside cheek every hour as needed for smoking cessation    Tobacco use disorder       * oxyCODONE IR 5 MG tablet    ROXICODONE    6 tablet    Take 0.5 tablets (2.5 mg) by mouth every 6 hours as needed for other (pain control or improvement in physical function. Hold dose for analgesic side effects.)    S/P lumbar fusion       * oxyCODONE IR 5 MG  tablet    ROXICODONE    30 tablet    Take 1 tablet (5 mg) by mouth every 8 hours as needed for severe pain Max of 3 tabs per day    S/P lumbar fusion       polyethylene glycol Packet    MIRALAX/GLYCOLAX    7 packet    Take 17 g by mouth daily as needed for constipation    S/P lumbar fusion       QUETIAPINE FUMARATE PO      Take 200 mg by mouth At Bedtime        SERTRALINE HCL PO      Take 100 mg by mouth daily        * Notice:  This list has 2 medication(s) that are the same as other medications prescribed for you. Read the directions carefully, and ask your doctor or other care provider to review them with you.

## 2018-07-25 NOTE — PROGRESS NOTES
"[facility]  Neurosurgery followup:    HPI: 6 weeks s/p L5-S1 TLIF. Continues to recover post operatively. States she does have some ongoing numbness in left leg and a \"charley horse\" sensation in left calf and bilateral low back. She has been walking with walker, which has been going well. Denies weakness.  Exam:  Constitutional:  Alert, well nourished, NAD.  HEENT: Normocephalic, atraumatic.   Pulm:  Without shortness of breath   CV:  No pitting edema of BLE.      Neurological:  Awake  Alert  Oriented x 3  Motor exam:        IP Q DF PF EHL  R   5  5   5   5    5  L   5  5   5   5    5     Reflexes are 2+ in the patellar and Achilles. There is no clonus. Downgoing Babinski.    Able to spontaneously move L/E bilaterally  Sensation intact throughout all L/E dermatomes     Incision: Healing nicely.  Imaging: AP and lateral lumbar films reveal stable hardware.  A/P: 6 weeks s/p L5-S1 TLIF. Continues to recover post operatively. XRs with stable hardware. Incision nicely healed. Full strength. I did discuss I will refill her oxy at this time but she should continue to wean at this point. I have also prescribed her flexeril to help with her muscle spasms. Will have her return in 6 weeks with xray prior. Patient voiced understanding and agreement.  - May increase lifting restriction to 20 pounds   - followup in 6 weeks with Dr. Quinn with xray prior   - Call the clinic at [phone] for increased pain or any other questions and concerns.      Hien Tran PA-C  [facility]  [address] [address]    Tel [phone]  Pager [phone]    "

## 2018-07-25 NOTE — PATIENT INSTRUCTIONS
-Continue to wean off of oxycodone at this time  - May increase lifting restriction to 20 pounds   - followup in 6 weeks with Dr. Quinn with xray prior   - Call the clinic at [phone] for increased pain or any other questions and concerns.

## 2018-09-10 ENCOUNTER — OFFICE VISIT (OUTPATIENT)
Dept: NEUROSURGERY | Facility: CLINIC | Age: 53
End: 2018-09-10
Attending: NEUROLOGICAL SURGERY
Payer: COMMERCIAL

## 2018-09-10 ENCOUNTER — RADIANT APPOINTMENT (OUTPATIENT)
Dept: GENERAL RADIOLOGY | Facility: CLINIC | Age: 53
End: 2018-09-10
Attending: PHYSICIAN ASSISTANT
Payer: COMMERCIAL

## 2018-09-10 VITALS
HEART RATE: 65 BPM | OXYGEN SATURATION: 100 % | TEMPERATURE: 97.7 F | SYSTOLIC BLOOD PRESSURE: 171 MMHG | DIASTOLIC BLOOD PRESSURE: 100 MMHG

## 2018-09-10 DIAGNOSIS — Z98.1 S/P LUMBAR FUSION: ICD-10-CM

## 2018-09-10 PROCEDURE — 99213 OFFICE O/P EST LOW 20 MIN: CPT | Performed by: NEUROLOGICAL SURGERY

## 2018-09-10 PROCEDURE — 72100 X-RAY EXAM L-S SPINE 2/3 VWS: CPT

## 2018-09-10 PROCEDURE — G0463 HOSPITAL OUTPT CLINIC VISIT: HCPCS

## 2018-09-10 RX ORDER — OXYCODONE HYDROCHLORIDE 5 MG/1
5 TABLET ORAL EVERY 6 HOURS PRN
Qty: 40 TABLET | Refills: 0 | Status: SHIPPED | OUTPATIENT
Start: 2018-09-10 | End: 2020-05-06

## 2018-09-10 ASSESSMENT — PAIN SCALES - GENERAL: PAINLEVEL: WORST PAIN (10)

## 2018-09-10 NOTE — LETTER
9/10/2018         RE: Annika Garrido  [address] [address]        Dear Colleague,    Thank you for referring your patient, Annika Garrido, to the [facility] NEUROSURGERY CLINIC. Please see a copy of my visit note below.    Doing well 3 mos post-op.  Some lingering back pain, improving.  No significant leg pain.  Still feels deconditioned.  Is able to urinate and have bowel movements, although she does feel increased urgency and prone to constipation.  Continued saddle anesthesias.       Past Medical History:   Diagnosis Date     Asymptomatic human immunodeficiency virus (HIV) infection status (H)      Past Surgical History:   Procedure Laterality Date     OPTICAL TRACKING SYSTEM FUSION POSTERIOR SPINE LUMBAR Left 6/10/2018    Procedure: OPTICAL TRACKING SYSTEM FUSION SPINE POSTERIOR LUMBAR ONE LEVEL;  LEFT LUMBAR 5 TO SACRAL 1 DECOMPRESSIVE LAMINECTOMY; EVACUATION OF DISC HERNIATION; TRANSFORAMINAL LUMBAR INTERBODY FUSION;  Surgeon: Jeison Quinn MD;  Location:  OR     Social History     Social History     Marital status:      Spouse name: N/A     Number of children: N/A     Years of education: N/A     Occupational History     Not on file.     Social History Main Topics     Smoking status: Current Every Day Smoker     Packs/day: 0.25     Smokeless tobacco: Never Used     Alcohol use No     Drug use: Yes     Special: Cocaine      Comment: in the past used, stated she been mostly clean since 2005      Sexual activity: Not on file     Other Topics Concern     Not on file     Social History Narrative     History reviewed. No pertinent family history.     ROS: 10 point ROS neg other than the symptoms noted above in the HPI.    Physical Exam  BP (!) 171/100  Pulse 65  Temp 97.7  F (36.5  C) (Oral)  LMP  (LMP Unknown)  SpO2 100%  Breastfeeding? No  HEENT:  Normocephalic, atraumatic.  PERRLA.  EOM s intact.  Visual fields full to gross exam  Neck:  Supple, non-tender,  without lymphadenopathy.  Heart:  No peripheral edema  Lungs:  No SOB  Abdomen:  Non-distended.   Skin:  Warm and dry.  Extremities:  No edema, cyanosis or clubbing.  Psychiatric:  No apparent distress  Musculoskeletal:  Normal bulk and tone    NEUROLOGICAL EXAMINATION:     Mental status:  Alert and Oriented x 3, speech is fluent.  Cranial nerves:  II-XII intact.   Motor:    Shoulder Abduction:  Right:  5/5   Left:  5/5  Biceps:                      Right:  5/5   Left:  5/5  Triceps:                     Right:  5/5   Left:  5/5  Wrist Extensors:       Right:  5/5   Left:  5/5  Wrist Flexors:           Right:  5/5   Left:  5/5  interosseus :            Right:  5/5   Left:  5/5   Hip Flexor:                Right: 5/5  Left:  5/5  Hip Adductor:             Right:  5/5  Left:  5/5  Hip Abductor:             Right:  5/5  Left:  5/5  Gastroc Soleus:        Right:  5/5  Left:  5/5  Tib/Ant:                      Right:  5/5  Left:  5/5  EHL:                     Right:  5/5  Left:  5/5  Sensation:  Intact  Reflexes:  Negative Babinski.  Negative Clonus.  Negative Plasencia's.  Coordination:  Smooth finger to nose testing.   Negative pronator drift.  Smooth tandem walking.  Incision well healed    A/P:  Physical therapy  Continue routine follow up      Again, thank you for allowing me to participate in the care of your patient.        Sincerely,        Jeison Quinn MD

## 2018-09-10 NOTE — PATIENT INSTRUCTIONS
1. Order for physical therapy  2. Follow up in 3 months with Hien or Roni Torres prior.  3. Please call our clinic with any questions or concerns: [phone]

## 2018-09-10 NOTE — PROGRESS NOTES
Doing well 3 mos post-op.  Some lingering back pain, improving.  No significant leg pain.  Still feels deconditioned.  Is able to urinate and have bowel movements, although she does feel increased urgency and prone to constipation.  Continued saddle anesthesias.       Past Medical History:   Diagnosis Date     Asymptomatic human immunodeficiency virus (HIV) infection status (H)      Past Surgical History:   Procedure Laterality Date     OPTICAL TRACKING SYSTEM FUSION POSTERIOR SPINE LUMBAR Left 6/10/2018    Procedure: OPTICAL TRACKING SYSTEM FUSION SPINE POSTERIOR LUMBAR ONE LEVEL;  LEFT LUMBAR 5 TO SACRAL 1 DECOMPRESSIVE LAMINECTOMY; EVACUATION OF DISC HERNIATION; TRANSFORAMINAL LUMBAR INTERBODY FUSION;  Surgeon: Jeison Quinn MD;  Location:  OR     Social History     Social History     Marital status:      Spouse name: N/A     Number of children: N/A     Years of education: N/A     Occupational History     Not on file.     Social History Main Topics     Smoking status: Current Every Day Smoker     Packs/day: 0.25     Smokeless tobacco: Never Used     Alcohol use No     Drug use: Yes     Special: Cocaine      Comment: in the past used, stated she been mostly clean since 2005      Sexual activity: Not on file     Other Topics Concern     Not on file     Social History Narrative     History reviewed. No pertinent family history.     ROS: 10 point ROS neg other than the symptoms noted above in the HPI.    Physical Exam  BP (!) 171/100  Pulse 65  Temp 97.7  F (36.5  C) (Oral)  LMP  (LMP Unknown)  SpO2 100%  Breastfeeding? No  HEENT:  Normocephalic, atraumatic.  PERRLA.  EOM s intact.  Visual fields full to gross exam  Neck:  Supple, non-tender, without lymphadenopathy.  Heart:  No peripheral edema  Lungs:  No SOB  Abdomen:  Non-distended.   Skin:  Warm and dry.  Extremities:  No edema, cyanosis or clubbing.  Psychiatric:  No apparent distress  Musculoskeletal:  Normal bulk and tone    NEUROLOGICAL  EXAMINATION:     Mental status:  Alert and Oriented x 3, speech is fluent.  Cranial nerves:  II-XII intact.   Motor:    Shoulder Abduction:  Right:  5/5   Left:  5/5  Biceps:                      Right:  5/5   Left:  5/5  Triceps:                     Right:  5/5   Left:  5/5  Wrist Extensors:       Right:  5/5   Left:  5/5  Wrist Flexors:           Right:  5/5   Left:  5/5  interosseus :            Right:  5/5   Left:  5/5   Hip Flexor:                Right: 5/5  Left:  5/5  Hip Adductor:             Right:  5/5  Left:  5/5  Hip Abductor:             Right:  5/5  Left:  5/5  Gastroc Soleus:        Right:  5/5  Left:  5/5  Tib/Ant:                      Right:  5/5  Left:  5/5  EHL:                     Right:  5/5  Left:  5/5  Sensation:  Intact  Reflexes:  Negative Babinski.  Negative Clonus.  Negative Plasencia's.  Coordination:  Smooth finger to nose testing.   Negative pronator drift.  Smooth tandem walking.  Incision well healed    A/P:  Physical therapy  Continue routine follow up

## 2018-12-12 ENCOUNTER — OFFICE VISIT (OUTPATIENT)
Dept: NEUROSURGERY | Facility: CLINIC | Age: 53
End: 2018-12-12
Attending: PHYSICIAN ASSISTANT
Payer: COMMERCIAL

## 2018-12-12 ENCOUNTER — ANCILLARY PROCEDURE (OUTPATIENT)
Dept: GENERAL RADIOLOGY | Facility: CLINIC | Age: 53
End: 2018-12-12
Attending: NEUROLOGICAL SURGERY
Payer: COMMERCIAL

## 2018-12-12 VITALS
HEART RATE: 69 BPM | SYSTOLIC BLOOD PRESSURE: 133 MMHG | DIASTOLIC BLOOD PRESSURE: 85 MMHG | OXYGEN SATURATION: 99 % | TEMPERATURE: 98 F

## 2018-12-12 DIAGNOSIS — Z98.1 S/P LUMBAR FUSION: ICD-10-CM

## 2018-12-12 DIAGNOSIS — Z98.1 S/P LUMBAR FUSION: Primary | ICD-10-CM

## 2018-12-12 PROCEDURE — 72100 X-RAY EXAM L-S SPINE 2/3 VWS: CPT

## 2018-12-12 PROCEDURE — G0463 HOSPITAL OUTPT CLINIC VISIT: HCPCS

## 2018-12-12 PROCEDURE — 99213 OFFICE O/P EST LOW 20 MIN: CPT | Performed by: PHYSICIAN ASSISTANT

## 2018-12-12 RX ORDER — CYCLOBENZAPRINE HCL 10 MG
5-10 TABLET ORAL 3 TIMES DAILY PRN
Qty: 40 TABLET | Refills: 0 | Status: SHIPPED | OUTPATIENT
Start: 2018-12-12 | End: 2020-05-06

## 2018-12-12 ASSESSMENT — PAIN SCALES - GENERAL: PAINLEVEL: WORST PAIN (10)

## 2018-12-12 NOTE — PATIENT INSTRUCTIONS
-Begin physical therapy    - followup in 6 months with xray prior     - Call the clinic at [phone] for increased pain or any other questions and concerns.    SMOKING CESSATION  What's in cigarette smoke? - Cigarette smoke contains over 4,000 chemicals. Nicotine is one of the main ingredients which is an insecticide/herbicide. It is poisonous to our nervous system, increases blood clotting risk, and decreases the body's defenses to fight off infection. Another chemical is Carbon Monoxide is an asphyxiating gas that permanently binds to blood cells and blocks the transport of oxygen. This leads to tissue death and decreases your metabolism. Tar is a chemical that coats your lungs and trachea which impairs new oxygen coming in and carbon dioxide getting out of your body.   How does smoking impact surgery? - Smoking is particularly hazardous with regards to surgery. Surgery puts stress on the body and a smoker's body is already under strain from these chemicals. Putting the two together, especially for an elective surgery, could be a recipe for disaster. Smoking before and after surgery increases your risk of heart problems, slow wound healing, delayed bone healing, blood clots, wound infection and anesthesia complications.   What are the benefits of quitting? - In 20 minutes your blood pressure will drop back down to normal. In 8 hours the carbon monoxide (a toxic gas) levels in your blood stream will drop by half, and oxygen levels will return to normal. In 48 hours your chance of having a heart attack will have decreased. All nicotine will have left your body. Your sense of taste and smell will return to a normal level. In 72 hours your bronchial tubes will relax, and your energy levels will increase. In 2 weeks your circulation will increase, and it will continue to improve for the next 10 weeks.    Recommendations for elective surgery - Ideally, patients should quit smoking 8 weeks before and at least 2 weeks  after elective surgery in order to avoid complications. Simply cutting back on the amount of cigarettes smoked per day does not offer any benefit or decrease the risk of poor wound healing, heart problems, and infection. Smokers should also start taking Vitamin C and B for two weeks before surgery and two weeks after surgery.    Ways to Stop Smokin. Nicotine patches, lozenges, or gum  2. Support Groups  3. Medications (see below)    List of Medications:  1. Varenicline Tartrate (CHANTIX)   2. Bupropion HCL (WELLBUTRIN, ZYBAN) - note: make sure Wellbutrin is for smoking cessation and not other issues   3. Nicotine Patch (NICODERM)   4. Nicotine Inhaler (NICOTROL)   5. Nicotine Gum Nicotine Polacrilex   6. Nicotine Lozenge: Nicotine Polacrilex (COMMIT)   * [place] offers a smoking support group as well!  Please visit: [email or web address]  If you are interested in these, ask about getting a prescription or talk to your primary care doctor about what may be the best way for you to quit.

## 2018-12-12 NOTE — NURSING NOTE
"Annika Garrido is a 53 year old female who presents for:  Chief Complaint   Patient presents with     Neurologic Problem     s/p lumbar fusion        Vitals:    Vitals:    12/12/18 1004   BP: 133/85   BP Location: Right arm   Patient Position: Sitting   Cuff Size: Adult Regular   Pulse: 69   Temp: 98  F (36.7  C)   TempSrc: Oral   SpO2: 99%       BMI:  Estimated body mass index is 29.45 kg/m  as calculated from the following:    Height as of 6/10/18: 5' 5\" (1.651 m).    Weight as of 6/22/18: 177 lb (80.3 kg).    Pain Score:  Worst Pain (10)        Elo Holt CMA, AAS      "

## 2018-12-12 NOTE — LETTER
12/12/2018         RE: Annika Garrido  [address] [facility] [address]        Dear Colleague,    Thank you for referring your patient, Annika Garrido, to the [facility] NEUROSURGERY CLINIC. Please see a copy of my visit note below.    [facility]  Neurosurgery followup:    HPI: 6 months s/p  L5-S1 TLIF. Continues to slowly recover. States she still gets intermittent muscle spasms, which were well controlled with flexeril in the past. 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 No weakness.  Exam:  Constitutional:  Alert, well nourished, NAD.  HEENT: Normocephalic, atraumatic.   Pulm:  Without shortness of breath   CV:  No pitting edema of BLE.      Neurological:  Awake  Alert  Oriented x 3  Motor exam:        IP Q DF PF EHL  R   5  5   5   5    5  L   5  5   5   5    5     Reflexes are 2+ in the patellar and Achilles. There is no clonus. Downgoing Babinski.    Able to spontaneously move L/E bilaterally  Sensation intact throughout all L/E dermatomes     Incision: Nicely healed  Imaging: AP and lateral lumbar films with stable hardware  A/P: 6 months s/p  L5-S1 TLIF. Continues to slowly recover. XRs with stable hardware. No weakness on exam. Discussed importance of beginning physical therapy to work on deconditioning. I did advise I will not refill any pain medications for her today, but I will do one time fill of flexeril for her muscle spasms. Will have her follow up in 6 months for routine post op. Patient voiced understanding and agreement.    -Begin physical therapy  - followup in 6 months with xray prior   - Call the clinic at [phone] for increased pain or any other questions and concerns.      Hien Tran PA-C  [facility]  [address] [address]    Tel [phone]  Pager [phone]        Again, thank you for allowing me to participate in the care of your  patient.        Sincerely,        Hien Tran PA-C

## 2018-12-12 NOTE — PROGRESS NOTES
[facility]  Neurosurgery followup:    HPI: 6 months s/p  L5-S1 TLIF. Continues to slowly recover. States she still gets intermittent muscle spasms, which were well controlled with flexeril in the past. 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 No weakness.  Exam:  Constitutional:  Alert, well nourished, NAD.  HEENT: Normocephalic, atraumatic.   Pulm:  Without shortness of breath   CV:  No pitting edema of BLE.      Neurological:  Awake  Alert  Oriented x 3  Motor exam:        IP Q DF PF EHL  R   5  5   5   5    5  L   5  5   5   5    5     Reflexes are 2+ in the patellar and Achilles. There is no clonus. Downgoing Babinski.    Able to spontaneously move L/E bilaterally  Sensation intact throughout all L/E dermatomes     Incision: Nicely healed  Imaging: AP and lateral lumbar films with stable hardware  A/P: 6 months s/p  L5-S1 TLIF. Continues to slowly recover. XRs with stable hardware. No weakness on exam. Discussed importance of beginning physical therapy to work on deconditioning. I did advise I will not refill any pain medications for her today, but I will do one time fill of flexeril for her muscle spasms. Will have her follow up in 6 months for routine post op. Patient voiced understanding and agreement.    -Begin physical therapy  - followup in 6 months with xray prior   - Call the clinic at [phone] for increased pain or any other questions and concerns.      Hien Tran PA-C  [facility]  [address] [address]    Tel [phone]  Pager [phone]

## 2018-12-17 ENCOUNTER — THERAPY VISIT (OUTPATIENT)
Dept: PHYSICAL THERAPY | Facility: CLINIC | Age: 53
End: 2018-12-17
Payer: COMMERCIAL

## 2018-12-17 DIAGNOSIS — M54.42 CHRONIC LEFT-SIDED LOW BACK PAIN WITH LEFT-SIDED SCIATICA: ICD-10-CM

## 2018-12-17 DIAGNOSIS — G89.29 CHRONIC LEFT-SIDED LOW BACK PAIN WITH LEFT-SIDED SCIATICA: ICD-10-CM

## 2018-12-17 PROCEDURE — G8979 MOBILITY GOAL STATUS: HCPCS | Mod: GP | Performed by: PHYSICAL THERAPIST

## 2018-12-17 PROCEDURE — 97110 THERAPEUTIC EXERCISES: CPT | Mod: GP | Performed by: PHYSICAL THERAPIST

## 2018-12-17 PROCEDURE — 97161 PT EVAL LOW COMPLEX 20 MIN: CPT | Mod: GP | Performed by: PHYSICAL THERAPIST

## 2018-12-17 PROCEDURE — G8978 MOBILITY CURRENT STATUS: HCPCS | Mod: GP | Performed by: PHYSICAL THERAPIST
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
## 2019-03-05 ENCOUNTER — TELEPHONE (OUTPATIENT)
Dept: BEHAVIORAL HEALTH | Facility: CLINIC | Age: 54
End: 2019-03-05

## 2019-03-05 NOTE — TELEPHONE ENCOUNTER
"Pt and MH  called for referral for pt to 55+ program.   Judit - [facility] - gave her number as temporary to contact patient through- ok'd on recorded line by patient.  [phone]    Pt will call back once she gets new phone.   No prior mh treatment at [facility]. Previously at [facility].    'Something to get out of the house and do something, too much time on my hands. Im anxious, I don't know\"  MH Bipolar psychosis  Meds Seraquil, Zoloft and Klonopin    Pt has SI, just thoughts to self only. Has safety plan in place.   Meena Delgadillo CNP manages meds [phone]    Reg updated, referral created, inbasket to verifiers. [facility]  "

## 2019-03-19 ENCOUNTER — HOSPITAL ENCOUNTER (OUTPATIENT)
Dept: BEHAVIORAL HEALTH | Facility: CLINIC | Age: 54
Discharge: HOME | End: 2019-03-19
Attending: PSYCHIATRY & NEUROLOGY | Admitting: PSYCHIATRY & NEUROLOGY
Payer: COMMERCIAL

## 2019-03-19 PROCEDURE — 90791 PSYCH DIAGNOSTIC EVALUATION: CPT

## 2019-03-19 RX ORDER — TEMAZEPAM 7.5 MG/1
7.5 CAPSULE ORAL
COMMUNITY
End: 2020-05-04

## 2019-03-19 RX ORDER — HYDROCODONE BITARTRATE AND ACETAMINOPHEN 5; 325 MG/1; MG/1
1 TABLET ORAL EVERY 6 HOURS PRN
COMMUNITY
End: 2020-05-04

## 2019-03-19 ASSESSMENT — PAIN SCALES - GENERAL: PAINLEVEL: MODERATE PAIN (5)
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
## 2019-03-19 NOTE — PROGRESS NOTES
Outpatient RN Fall Risk Assessment    1.  What is the patient's recent history of falls?    Early Feb, multiple falls especially when getting up from out of the bed, or from sitting to standing in AM.    2.  Does that patient use an assistive device?  Yes- walker    3.  Does the patient have any visual impairment?  no    4.  Does the patient have any cognitive impairment, take any medications, or have any medical conditions that may affect cognition or balance?    Thinks she has postural hypotension    5.  Does the patient have any fears of falling?  Yes- she is currently not using her walker inside the house, but is considering re- arranging furniture so she can use walker in the house    6.  What services is the patient receiving to address any of the above issues?  Sees a doctor, has had fall prevention education  Agrees to  use walker in program and follow fall prevention techniques.

## 2019-03-21 NOTE — TELEPHONE ENCOUNTER
----- Message from PADMA Magallanes sent at 3/21/2019  4:10 PM CDT -----  Regardin+ start  Can start B2 track (Mon/Wed) on 3/25, followed by Dr. Bautista.   She has [employer] connect and will need auth.   Thanks!~Linnea

## 2019-03-26 ENCOUNTER — TELEPHONE (OUTPATIENT)
Dept: BEHAVIORAL HEALTH | Facility: CLINIC | Age: 54
End: 2019-03-26

## 2019-10-22 ENCOUNTER — APPOINTMENT (OUTPATIENT)
Dept: GENERAL RADIOLOGY | Facility: CLINIC | Age: 54
End: 2019-10-22
Attending: EMERGENCY MEDICINE
Payer: COMMERCIAL

## 2019-10-22 ENCOUNTER — HOSPITAL ENCOUNTER (EMERGENCY)
Facility: CLINIC | Age: 54
Discharge: HOME | End: 2019-10-22
Attending: EMERGENCY MEDICINE | Admitting: EMERGENCY MEDICINE
Payer: COMMERCIAL

## 2019-10-22 VITALS
WEIGHT: 180 LBS | HEIGHT: 66 IN | RESPIRATION RATE: 20 BRPM | SYSTOLIC BLOOD PRESSURE: 135 MMHG | TEMPERATURE: 98.3 F | OXYGEN SATURATION: 96 % | DIASTOLIC BLOOD PRESSURE: 80 MMHG | HEART RATE: 70 BPM | BODY MASS INDEX: 28.93 KG/M2

## 2019-10-22 DIAGNOSIS — M54.50 CHRONIC LOW BACK PAIN WITHOUT SCIATICA, UNSPECIFIED BACK PAIN LATERALITY: ICD-10-CM

## 2019-10-22 DIAGNOSIS — G89.29 CHRONIC LOW BACK PAIN WITHOUT SCIATICA, UNSPECIFIED BACK PAIN LATERALITY: ICD-10-CM

## 2019-10-22 PROCEDURE — 99283 EMERGENCY DEPT VISIT LOW MDM: CPT

## 2019-10-22 PROCEDURE — 72100 X-RAY EXAM L-S SPINE 2/3 VWS: CPT

## 2019-10-22 PROCEDURE — 99284 EMERGENCY DEPT VISIT MOD MDM: CPT

## 2019-10-22 PROCEDURE — 25000132 ZZH RX MED GY IP 250 OP 250 PS 637: Performed by: EMERGENCY MEDICINE

## 2019-10-22 RX ORDER — LIDOCAINE 50 MG/G
1-2 PATCH TOPICAL EVERY 12 HOURS PRN
Qty: 15 PATCH | Refills: 0 | Status: SHIPPED | OUTPATIENT
Start: 2019-10-22 | End: 2020-05-06

## 2019-10-22 RX ORDER — LIDOCAINE 4 G/G
1 PATCH TOPICAL ONCE
Status: DISCONTINUED | OUTPATIENT
Start: 2019-10-22 | End: 2019-10-22 | Stop reason: HOSPADM

## 2019-10-22 RX ORDER — HYDROCODONE BITARTRATE AND ACETAMINOPHEN 5; 325 MG/1; MG/1
1 TABLET ORAL ONCE
Status: COMPLETED | OUTPATIENT
Start: 2019-10-22 | End: 2019-10-22

## 2019-10-22 RX ADMIN — HYDROCODONE BITARTRATE AND ACETAMINOPHEN 1 TABLET: 5; 325 TABLET ORAL at 17:21

## 2019-10-22 RX ADMIN — LIDOCAINE 1 PATCH: 560 PATCH PERCUTANEOUS; TOPICAL; TRANSDERMAL at 17:20

## 2019-10-22 ASSESSMENT — ENCOUNTER SYMPTOMS: BACK PAIN: 1

## 2019-10-22 ASSESSMENT — MIFFLIN-ST. JEOR: SCORE: 1433.22

## 2019-10-22 NOTE — ED NOTES
"Spoke with patient. Patient states she is not currently suicidal. States \"I am grieving, my baby girl was murdered in [place] at the beginning of the month and then my other daughter took my dog\" Im only here because my back hurts and I had a fusion and I want to see if something is wrong with it. Patient denies SI at this time.   "

## 2019-10-22 NOTE — ED AVS SNAPSHOT
Emergency Department  [address] [place] [id number]  Phone:  [phone]  Fax:  [phone]                                    Annika Garrido   MRN: [id number]    Department:   Emergency Department   Date of Visit:  10/22/2019           After Visit Summary Signature Page    I have received my discharge instructions, and my questions have been answered. I have discussed any challenges I see with this plan with the nurse or doctor.    ..........................................................................................................................................  Patient/Patient Representative Signature      ..........................................................................................................................................  Patient Representative Print Name and Relationship to Patient    ..................................................               ................................................  Date                                   Time    ..........................................................................................................................................  Reviewed by Signature/Title    ...................................................              ..............................................  Date                                               Time          22EPIC Rev 08/18

## 2019-10-22 NOTE — ED PROVIDER NOTES
"  History     Chief Complaint:  Back Pain and Suicidal    HPI   Annika Garrido is a 54 year old female who presents with back pain and suicidal ideation. The patient reports that she had some recent family death, but she is not suicidal here, but that she is just here for back pain which she wants x-ray done for. The patient has history of spinal fusion about a year ago and has had chronic back pain and chronic numbness from this. Numbness is unchanged. No bowel or urinary symptoms. No fever or falls. She reports that her mental health is stable and she has good outpatient resources. Is not suicidal.    Allergies:  No known drug allergies     Medications:    Albuterol  Clonazepam  Flexeril  Dulutegravir  Truvada  [place]  Roxicodone  Quetiapine fumarate   Sertraline  Temazepam    Past Medical History:    Arthritis  Asymptomatic HIV status  Depressive disorder  Uncomplicated asthma    Past Surgical History:    Cholecystectomy  Decompressive laminectomy    Family History:    Schizophrenia    Social History:  Smoking status: Current every day smoker  Alcohol use: Yes  Drug use: Yes, cocaine  [facility]  Presents to the ED with herserlf  Marital Status:        Review of Systems   Musculoskeletal: Positive for back pain.   Psychiatric/Behavioral: Negative for suicidal ideas.   All other systems reviewed and are negative.    Physical Exam     Patient Vitals for the past 24 hrs:   BP Temp Temp src Heart Rate Resp SpO2 Height Weight   10/22/19 1602 134/85 98.3  F (36.8  C) Oral 64 16 98 % 1.676 m (5' 6\") 81.6 kg (180 lb)       Physical Exam  General: The patient appears comfortable sitting in bed  Head:  The scalp, face, and head appear normal  Eyes:  The pupils are equal and round  ENT:    Moist mucous membranes  Neck:  Normal range of motion  CV:  Regular rate and underlying rhythm   Resp:  Lungs are clear    Non-labored breathing    No rales    No wheezing   GI:  Abdomen is soft, there is no " rigidity    No distension    No rebound tenderness     No abdominal tenderness  MS:  Back:    The cervical and thoracic spine is non-tender in the midline    The lumbar spine is non-tender in the midline    There is mild lumbar paraspinous muscular pain to palpation    Legs:    There is normal motor strength:     Iliopsoas, quadriceps, hamstrings, tibialis anterior, gastrocnemius, EHL    SILT on bilateral lower extremities  Skin:  No rash or acute skin lesions noted.  No shingles noted. Prior surgical scar well healed on lumbar spine  Neuro: Speech is normal and fluent    Awake and alert    Face symmetric    Gait stable    Emergency Department Course   Imaging:  Radiographic findings were communicated with the patient who voiced understanding of the findings.    Lumbar Spine XR, 2-3 Views  Again seen are postoperative changes following posterior  elisabeth and screw fixation at L5-S1 with intervertebral disc spacer at  that level. Surgical hardware appears intact and in similar alignment  to prior. No new loss of vertebral body height. Mild grade 1  anterolisthesis of L3 on L4 which is unchanged. Moderate loss of  vertebral disc height and degenerative endplate changes at L3-L4.  Overall, no significant change since prior.  As read by Radiology.    Interventions:  1720: 1 patch Lidocaine, applied transdermally  1721: 1 tablet Norco, 3-525 mg PO    Emergency Department Course:  Past medical records, nursing notes, and vitals reviewed.  1620: I performed an exam of the patient and obtained history, as documented above.    The patient was sent for a lumbar spine x-ray while in the emergency department, findings above.    1728: I rechecked the patient. Explained findings to patient.    Findings and plan explained to the patient. Patient discharged home with instructions regarding supportive care, medications, and reasons to return. The importance of close follow-up was reviewed. The patient was prescribed  Lidocaine.    Impression & Plan    Medical Decision Making:  The patient presents for evaluation of low back pain.  The patient has a history of chronic low back pain and by their report the pain today is in a similar location and character as previous exacerbations of low back pain. There are no new neurologic symptoms reported by the patient. The patient has a normal neurologic exam. At this time I have no clinical suspicion for moreserious process of low back pain including not limited to epidural abscess, hematoma,  discitis, or any referred acute intra-abdominal process, vascular, or genitourinary process.    There are no findings on exam or history at this time to suggest any type of acute cord  compromise or cauda equina syndrome. No recent high risk features such as fevers,  recent trauma, new neurologic symptoms or history of malignancy.Patient requesting xray which showed her spinal fusion but no acute findings. The patient will be discharged home. I discussed the need to follow-up with primary physician for continued evaluation and management of chronic low back pain. The patient was prescribed a prescription of Lidocaine patch.     Diagnosis:    ICD-10-CM   1. Chronic low back pain without sciatica, unspecified back pain laterality M54.5    G89.29     Disposition: Discharged to home    Discharge Medications:  New Prescriptions    LIDOCAINE (LIDODERM) 5 % PATCH    Place 1-2 patches onto the skin every 12 hours as needed for moderate pain To prevent lidocaine toxicity, patient should be patch free for 12 hrs daily.     IAllie, am serving as a scribe at 4:20 PM on 10/22/2019 to document services personally performed by Shabana Joseph MD based on my observations and the provider's statements to me.     Allie Jain  10/22/2019    EMERGENCY DEPARTMENT       Shabana Joseph MD  10/23/19 0016

## 2019-10-22 NOTE — ED TRIAGE NOTES
"Patient reports for the last 3-4 days her, \"spine has been hurting in the center of her back.\"   "

## 2019-10-22 NOTE — DISCHARGE INSTRUCTIONS
Lidocaine patches as needed  Keep on for 12 hours then take off for 12 hours then can put new patches on    Discharge Instructions  Back Pain  You were seen today for back pain. Back pain can have many causes, but most will get better without surgery or other specific treatment. Sometimes there is a herniated ( slipped ) disc. We do not usually do MRI scans to look for these right away, since most herniated discs will get better on their own with time.  Today, we did not find any evidence that your back pain was caused by a serious condition. However, sometimes symptoms develop over time and cannot be found during an emergency visit, so it is very important that you follow up with your primary provider.  Generally, every Emergency Department visit should have a follow-up clinic visit with either a primary or a specialty clinic/provider. Please follow-up as instructed by your emergency provider today.    Return to the Emergency Department if:  You develop a fever with your back pain.   You have weakness or change in sensation in one or both legs.  You lose control of your bowels or bladder, or cannot empty your bladder (cannot pee).  Your pain gets much worse.     Follow-up with your provider:  Unless your pain has completely gone away, please make an appointment with your provider within one week. Most of the routine care for back pain is available in a clinic and not the Emergency Department. You may need further management of your back pain, such as more pain medication, imaging such as an X-ray or MRI, or physical therapy.    What can I do to help myself?  Remain Active -- People are often afraid that they will hurt their back further or delay recovery by remaining active, but this is one of the best things you can do for your back. In fact, staying in bed for a long time to rest is not recommended. Studies have shown that people with low back pain recover faster when they remain active. Movement helps to bring  blood flow to the muscles and relieve muscle spasms as well as preventing loss of muscle strength.  Heat -- Using a heating pad can help with low back pain during the first few weeks. Do not sleep with a heating pad, as you can be burned.   Pain medications - You may take a pain medication such as Tylenol  (acetaminophen), Advil , Motrin  (ibuprofen) or Aleve  (naproxen).  If you were given a prescription for medicine here today, be sure to read all of the information (including the package insert) that comes with your prescription.  This will include important information about the medicine, its side effects, and any warnings that you need to know about.  The pharmacist who fills the prescription can provide more information and answer questions you may have about the medicine.  If you have questions or concerns that the pharmacist cannot address, please call or return to the Emergency Department.   Remember that you can always come back to the Emergency Department if you are not able to see your regular provider in the amount of time listed above, if you get any new symptoms, or if there is anything that worries you.

## 2020-03-05 ENCOUNTER — TRANSFERRED RECORDS (OUTPATIENT)
Dept: HEALTH INFORMATION MANAGEMENT | Facility: CLINIC | Age: 55
End: 2020-03-05

## 2020-03-06 ENCOUNTER — TELEPHONE (OUTPATIENT)
Dept: BEHAVIORAL HEALTH | Facility: CLINIC | Age: 55
End: 2020-03-06

## 2020-03-06 NOTE — TELEPHONE ENCOUNTER
This [facility] screening is being staffed with Lelo Mancuso, the clinical director of the [facility] program, on 3/6/2020.

## 2020-03-06 NOTE — TELEPHONE ENCOUNTER
Received R25 from Mackenzie at [facility] [phone].   Pt called to follow up. Stated she has been having phone issues, provided MH , Toney Jain [phone]. Pt requested that Toney is called if pt can't be contacted.  Docs faxed to [facility].  Ref created/ bens sent.  Msg routed to Radhames SIERRA

## 2020-03-09 NOTE — TELEPHONE ENCOUNTER
LP Screening phone call to: Annika Garrido, MR #: [id number]    FEMALE - A voice message was left for this patient on 3/9/2020 at 9;59 AM instructing her to call this counselor back.  Additional information was needed to complete her LP screening.  This counselor will await a return call from this patient.      Funding:   [facility] - Pays at 100%, no need for financial approval from the business office.   Recent use history:   ALC: TBD when patient calls back  Crack:  TBD when patient calls back  Heroin: TBD when patient calls back    TBD when the patient calls back.   Medical issues:   TBD when the patient calls back.   Mental Health issues:   TBD when the patient calls back.   Current medications:    TBD when the patient calls back.   SI/HI:   TBD when the patient calls back.   Registered offender:   TBD when the patient calls back.   Results:   TBD when the patient calls back.

## 2020-04-13 NOTE — TELEPHONE ENCOUNTER
This counselor spoke to ROMERO De Santiago for this patient, on 4/13/2020 at 1:52 pm. Jonnathan states that this patient is not willing to stop using klonopin so she is looking into other programs. No longer interested in LP.

## 2020-04-13 NOTE — TELEPHONE ENCOUNTER
LP Screening phone call to: Annika Garrido, MR #: [id number]    FEMALE - A voice message was left for this patient on 4/13/2020 at 1;32 PM instructing her to call this counselor back.  Additional information was needed to complete her LP screening.  This counselor will await a return call from this patient.      Funding:   [facility] - Pays at 100%, no need for financial approval from the business office.   Recent use history:   TBD : when patient calls back   Withdrawal risk:   TBD when the patient calls back.   Medical issues:   TBD when the patient calls back.   Mental Health issues:   TBD when the patient calls back.   Current medications:    TBD when the patient calls back.   SI/HI:   TBD when the patient calls back.   Registered offender:   TBD when the patient calls back.   Results:   TBD when the patient calls back.

## 2020-04-16 ENCOUNTER — TELEPHONE (OUTPATIENT)
Dept: BEHAVIORAL HEALTH | Facility: CLINIC | Age: 55
End: 2020-04-16

## 2020-04-16 NOTE — TELEPHONE ENCOUNTER
Date: 4/16/2020    To: ARNOLD RN    Please call this patient at [phone] or [phone]  to complete a medical screening to clarify her medications and medical condition.      The patient has a history of:  Needs the COVID screen.   She has a history of klonopin use. She claims she has not used Klonopin for 20 days. Confirm.    OUTSIDE: This is an outside referral so I will tube up the assessment and any other clinical documentation to the 6th floor.     Thanks,  NANCI Royal

## 2020-04-16 NOTE — TELEPHONE ENCOUNTER
LP SCREEN TELEPHONE NOTE   Annika Radhika paperwork was reviewed by NANCI Royal and the patient was deemed ELIGIBLE for the LP program.     Medical This [facility]   Insurance: ARE: MA/NITIN (with a [place] EVAL/UPDATE) - 1.) The admission counselor (Silver, Romina, Radhames, or Duarte for LP and the service initiation counselor for the EOP & DOP programs) will fill out the [facility] referral forms with the patient's start date and then fax the [facility] referral forms to [facility] to activate the authorization for treatment.  2.) The admission counselor (see above) will document the authorization in the insurance referral note after the authorization has been obtained from [facility].      This will be a: HALF evaluation. (LP UPDATE NOTE, ADDENDUM, VAA, DRE's, ISP, DANMIRIAN, & SBAR)     IV use or Pregnant: This patient is not pregnant or an IV drug user.     Business office: The patient has [facility] MA/PMAP and would NOT need to consult with anyone in [place]'s business office about the out of pocket costs of the LP program.     List: This patient CAN NOT be placed on the PRIORITY LP Waiting List until after being medically approved for the LP program by the LP RN.       Group: [facility]     Additional Info as needed: NA     The best current contact telephone number for the patient is: [phone]       Pablo Villa LADC   4/16/2020

## 2020-04-16 NOTE — TELEPHONE ENCOUNTER
Writer reaching out to pt at [phone] for medical and COVID-190 screen.  Message left on  for callback

## 2020-04-27 NOTE — TELEPHONE ENCOUNTER
COVID-19 Screen    In the last 2 weeks how frequently have you been out in the community? Grocery store once per week       Who do you live with and have they been following stay at home orders? Lives with her spouse, her daughter and grandchild all have been following stay at home orders  Have you come in contact with anyone in the last month who was suspected of or tested positive for COVID-19? no    Do you have or have you had any of these symptoms in the last 2 weeks? no      Fever     Cough     Shortness of breath or difficulty breathing     Chills     Repeated shaking with chills    Muscle pain     Headache     Sore throat     New loss of taste or smell      Pt approved for COVID-19 screen? Yes pending infection prevention review    Pt has been advised of the following:      Continue to follow  stay at home orders      Follow social distancing guidelines    Do not attend indoor gatherings such as house parties or visiting people who don't live in your home    Practice good hand washing hygiene and avoid touching your face    Consider mouth and nose cover when going out into the community    Alert LP if you come in contact with someone who is suspected of or tested positive for COVID-19 or is symptomatic    Alert LP if you become symptomatic or test positive for COVID-19 and do not come to LP until medically cleared.        Referral Medical Screening:  Per client self report    1) Medications?  Albuterol  Clonazepam-pt reports in the last 2 weeks she has only taken this 2x, denies daily use  Flexeril  Dulutegravir  Truvada  Quetiapine fumarate   Sertraline  Temazepam   Abilify  Past Medical History:        2) Medical conditions? Arthritis  Asymptomatic HIV status  Depressive disorder  Uncomplicated asthma    3) Independent with ADL'S?  Yes -does use a PCA to help around the house, but says she can do ADL'S independetly    4) Assistive devices (ambulatory, 1orthotics, hearing, c-pap etc.)?  Uses a walker for  "longer distances, was told she would need to bring this to LP+    5) Fall risk?  no    6) Pain management concerns?  Yes, she has chronic back pain that she mangages by taking \" lot\" of tylenol and ibuprofen.     7) Dental health concerns? Needs implants at some point , but reports nothing emergent right now    8) Skin integrity concerns (wounds, rash, etc)?  None    9) Infectious disease concerns (MRSA, ESBL, VRE, C-Diff, TB, etc.)  None    10) Mental health concerns /suicidal ideation?  biploar with psychosis      Based on above assessment: Client meets medical criteria for admission to [facility] PENDING review from infection prevention, will also staff pt with other LP+ RN and                 "

## 2020-04-28 ENCOUNTER — TELEPHONE (OUTPATIENT)
Dept: BEHAVIORAL HEALTH | Facility: CLINIC | Age: 55
End: 2020-04-28

## 2020-04-28 NOTE — TELEPHONE ENCOUNTER
LP SCREEN TELEPHONE NOTE   Annika Radhika paperwork was reviewed by NANCI Royal and the patient was deemed ELIGIBLE for the LP program.     Medical: This patient was approved by the [facility] on 4/27/2020.     Insurance: [facility]: MA/PMAP (with a [place] EVAL/UPDATE) - 1.) The admission counselor (Silver, Romina, Radhames, or Duarte for LP and the service initiation counselor for the EOP & DOP programs) will fill out the [facility] referral forms with the patient's start date and then fax the [facility] referral forms to [facility] to activate the authorization for treatment.  2.) The admission counselor (see above) will document the authorization in the insurance referral note after the authorization has been obtained from [facility].      This will be a: HALF evaluation. (LP UPDATE NOTE, ADDENDUM, VAA, DRE's, ISP, DANNES, & SBAR)     IV use or Pregnant: This patient is not pregnant or an IV drug user.     Business office: The patient has [facility] MA/PMAP and would NOT need to consult with anyone in [place]'s business office about the out of pocket costs of the LP program.     List: This patient CAN be placed on the COMMUNITY LP Waiting List at this time.       Group: Women's Group     Additional Info as needed: NA     The best current contact telephone number for the patient is: [phone]       ThanksPablo LADC   4/28/2020

## 2020-04-28 NOTE — TELEPHONE ENCOUNTER
Pt added to [facility] lodging [facility] community wait list.      Referral created.      OP UR will notify [facility] at time of start.  Start date has NOT yet been determined.

## 2020-04-29 NOTE — TELEPHONE ENCOUNTER
Writer called pt at [phone] for medical screen (requested by peer Jose) .  Left  for call back

## 2020-04-30 NOTE — TELEPHONE ENCOUNTER
Writer called pt at [phone] to re-review pt medications.  Pt retrieved bottles and read each one off to writer.  See below:    Zoloft - depression  Seroquel - bedtime  Descovy HIV  Tivikay HIV  Albuterol - uncomplicated asthma  Abilify - pt is not currently taking    Clonazepam - pt has not been taking and prior to taking was minimal (twice in 2 weeks)     Temazepam - pt was using for sleep.  Per pt====She stopped taking the 50mg as prescribed because she said it was not working.  Prescriber then  Increased dosing to 100mg on March 20, but decided still not to take it, because she felt like she did not really need it.  Pt informed that this medication (benzo) is not allowed at LP.  Pt said this would not be a problem  Writer informed pt that IF she had been taking this medication for a long period she would need to be followed by provider for taper    Pt admitted she is independent in all cares.  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  Pt states she has seated 4-wheeled walker and can ambulate very well with this.  Pt states she will attend all programming and go to med room and self-administer medications  Pt asked to bring mouth covering for duration of stay at LP     Pt confirms that she has followed stay at home order and denies any COVID-19 symptoms (full COVID screen completed on 10/22)    LP RN will re-screen 72 hrs prior to pt being admitted to LP    Pt medically cleared for admission to LP   COVID-19 screen - awaiting IP (email sent)  Writer get back to pt with IP advisement

## 2020-04-30 NOTE — TELEPHONE ENCOUNTER
Infection Prevention Mirta has approved this pt (COVID_19) for admission to LP    Please get this pt into LP asap    Routing to Radhames Ty & Outpt intake for expidition

## 2020-04-30 NOTE — TELEPHONE ENCOUNTER
Patient has been added to F Priority wait list as cleared by nursing.    OP UR is responsible for authorization but unable to pursue until start date has been determined.

## 2020-05-01 NOTE — TELEPHONE ENCOUNTER
Travel Screening  Communicable Disease Screening:  In the last month have you been in contact with someone who was confirmed or suspected to have Coronavirus/COVID-19?    Yes     No/Unsure     Unable to assess  Do you have any of the following symptoms?    None of these    Unable assess     Cough    Fever     Rash    Shortness of breath   Travel History:  Have you traveled internationally in the last month?    Yes     No    Unable to assess

## 2020-05-04 ENCOUNTER — HOSPITAL ENCOUNTER (OUTPATIENT)
Dept: BEHAVIORAL HEALTH | Facility: CLINIC | Age: 55
End: 2020-05-04
Attending: FAMILY MEDICINE
Payer: COMMERCIAL

## 2020-05-04 VITALS
HEART RATE: 68 BPM | SYSTOLIC BLOOD PRESSURE: 152 MMHG | DIASTOLIC BLOOD PRESSURE: 98 MMHG | TEMPERATURE: 97.7 F | OXYGEN SATURATION: 100 %

## 2020-05-04 DIAGNOSIS — F17.200 NICOTINE DEPENDENCE: Primary | ICD-10-CM

## 2020-05-04 PROCEDURE — 10020000 ZZH LODGING PLUS FACILITY CHARGE ADULT

## 2020-05-04 RX ORDER — NICOTINE 21 MG/24HR
1 PATCH, TRANSDERMAL 24 HOURS TRANSDERMAL EVERY 24 HOURS
Qty: 14 PATCH | Refills: 1 | Status: SHIPPED | OUTPATIENT
Start: 2020-05-04 | End: 2022-12-21

## 2020-05-04 RX ORDER — ACETAMINOPHEN 325 MG/1
325-650 TABLET ORAL EVERY 6 HOURS PRN
COMMUNITY
End: 2020-05-27

## 2020-05-04 RX ORDER — MOMETASONE FUROATE 1 MG/G
OINTMENT TOPICAL 2 TIMES DAILY
COMMUNITY

## 2020-05-04 RX ORDER — LANOLIN ALCOHOL/MO/W.PET/CERES
1 CREAM (GRAM) TOPICAL
COMMUNITY
End: 2020-05-27

## 2020-05-04 RX ORDER — POLYETHYLENE GLYCOL 3350 17 G
2 POWDER IN PACKET (EA) ORAL
Qty: 72 LOZENGE | Refills: 1 | Status: SHIPPED | OUTPATIENT
Start: 2020-05-04 | End: 2022-12-21

## 2020-05-04 RX ORDER — AMOXICILLIN 250 MG
2 CAPSULE ORAL DAILY
COMMUNITY
End: 2020-05-27

## 2020-05-04 RX ORDER — LORATADINE 10 MG/1
10 TABLET ORAL DAILY
COMMUNITY
End: 2020-05-27

## 2020-05-04 RX ORDER — IBUPROFEN 200 MG
600 TABLET ORAL 3 TIMES DAILY PRN
COMMUNITY
End: 2022-12-20

## 2020-05-04 RX ORDER — MAGNESIUM HYDROXIDE/ALUMINUM HYDROXICE/SIMETHICONE 120; 1200; 1200 MG/30ML; MG/30ML; MG/30ML
30 SUSPENSION ORAL EVERY 6 HOURS PRN
COMMUNITY
End: 2020-05-27

## 2020-05-04 ASSESSMENT — ANXIETY QUESTIONNAIRES
7. FEELING AFRAID AS IF SOMETHING AWFUL MIGHT HAPPEN: NOT AT ALL
IF YOU CHECKED OFF ANY PROBLEMS ON THIS QUESTIONNAIRE, HOW DIFFICULT HAVE THESE PROBLEMS MADE IT FOR YOU TO DO YOUR WORK, TAKE CARE OF THINGS AT HOME, OR GET ALONG WITH OTHER PEOPLE: NOT DIFFICULT AT ALL
6. BECOMING EASILY ANNOYED OR IRRITABLE: NOT AT ALL
2. NOT BEING ABLE TO STOP OR CONTROL WORRYING: NOT AT ALL
4. TROUBLE RELAXING: NOT AT ALL
1. FEELING NERVOUS, ANXIOUS, OR ON EDGE: NOT AT ALL
GAD7 TOTAL SCORE: 0
3. WORRYING TOO MUCH ABOUT DIFFERENT THINGS: NOT AT ALL
5. BEING SO RESTLESS THAT IT IS HARD TO SIT STILL: NOT AT ALL

## 2020-05-04 ASSESSMENT — PATIENT HEALTH QUESTIONNAIRE - PHQ9: SUM OF ALL RESPONSES TO PHQ QUESTIONS 1-9: 3
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
## 2020-05-04 NOTE — PROGRESS NOTES
[facility] Health Assessment      Vital signs:     BP (!) 152/98   Pulse 68   Temp 97.4  F (36.3  C) (Temporal)   LMP  (LMP Unknown)   SpO2 99%       Direct admission    Counselor: Casandra  Drug of Choice: Crack  Last use: last evening  Home clinic/MD: Pt reports she is changing providers soon, this is her current provider Yesenia Fragoso MD    [id number]    [place] [address]    [phone] (Fax)    Psychiatrist/therapist: not currently    Medical history/current conditions:  Chronic Pain Syndrome, Cauda equina compression, HIV    H&P Screen:  H&P within the last 90 days: Yes.  Date: 2/11/20 Location: [facility] [place] 2[place]      Mental Health diagnosis: Bipolar, depressive disorder  Medication compliant?: yes  Recent sucidal thoughts? no     When? na  Current thought of self-harm? no    Plan? na    Pain assessment:   Pt. Experiencing pain at this time?  Yes.  Rating on 0-10 scale: (1-10 scale): 5.  Location: back  Chronic  Result of: past surgeries.     Nursing Assessment Summary:Integrative Therapies: Essential Oils    Essential oils were not discussed at admission, staff will discuss with pt at another time during her stay    Patient tobacco use: Yes    1 pack of cigarettes per day for 20 plus years    Are you interested in quitting? Not at this time, but understands she will not b ableto smoke while at [facility]    NRT (Nicotine Replacement therapy) ordered?yes   Pt is aware of the dangers of tobacco cessation and in contemplation.    Pt given written education.      Patient flu vaccine (screen during flu season / offer vaccination at [facility]) received shot this flu season    COVID-19 - Pt informed of the following while at [facility]:    Wear mask over nose/mouth at all times when out in pt milieu    Staff will take temperature and O2Sats twice daily    Practice good hand washing hygiene and avoid touching face    If pt has any of the symptoms below,  notify staff immediately.      Fever     Cough     Shortness of breath or difficulty breathing     Chills     Repeated shaking with chills    Muscle pain     Headache     Sore throat     New loss of taste or smell          On-going nursing intervention required?   no  Acute care visit recommended: no

## 2020-05-04 NOTE — PROGRESS NOTES
Name: Annika Radhika  Date: 5/4/2020  Medical Record: [id number]    Envelope Number: [id number]    List of Contents (List each item separately in new row):     Capsaicin cream 0.025%    Admission:  I am responsible for any personal items that are not sent to the safe or pharmacy.  [facility] is not responsible for loss, theft or damage of any property in my possession.      Patient Signature:  ___________________________________________       Date/Time:__________________________    Staff Signature: __________________________________       Date/Time:__________________________    2nd Staff person, if patient is unable/unwilling to sign:      __________________________________________________________       Date/Time: __________________________      Discharge:  [facility] has returned all of my personal belongings:    Patient Signature: ________________________________________     Date/Time: ____________________________________    Staff Signature: ______________________________________     Date/Time:_____________________________________

## 2020-05-04 NOTE — TELEPHONE ENCOUNTER
"JAYJAYAR  Name:   Annika Garrido   YOB: 1965 Age:  54 year old Gender:  female   Referral Source: Self   Referral DRE: N/A   Insurance: [facility]: MA/NITIN (with a [employer] [place] [employer] [place] EVAL/UPDATE) - 1.) The admission counselor (Silver, Romina, Radhames, or Duarte for [facility] and the service initiation counselor for the EOP & DOP programs) will fill out the [facility] referral forms with the patient's start date and then fax the [facility] referral forms to [facility] to activate the authorization for treatment.  2.) The admission counselor (see above) will document the authorization in the insurance referral note after the authorization has been obtained from [facility].    Financial Screening: Financial approval from [employer] business office is NOT NEEDED.     Precipitating Event: Treatment due to own awareness of need for help, Treatment due to pressure from family members to get help and Treatment due to financial pressures caused by drug/alcohol use     DOC: Alcohol and Cocaine/Crack    Additional abused substances: Marijuana     Medical: HIV     Mental Health: BPAD     Prior Detox admissions: 3 prior IP detoxification admission(s).    Prior CD treatments: 3 prior CD treatment(s).     Psychosocial history:   Single, in a serious relationship    4 adult child(angel)    Stable housing and no concerns    Tendency to isolate from others, Relationship conflict with family members or friends due to substance abuse, Unemployed and Financial problems     [place] Suicide Risk Status:  Past month: 0. - Very Low Risk:  Evaluation Counselors:  Document in Epic / Get TogetherAR to counselor \"Very Low Risk\".      Treatment Counselors:  Reassess upon admission as applicable, assess weekly in progress notes under Dimension 3 and summarize in Discharge / Treatment summary under Dimension 3.    Past 24 hours: 0. - Very Low Risk:  Evaluation Counselors:  Document in Epic / Get TogetherAR to counselor \"Very Low Risk\".      Treatment Counselors:  Reassess upon admission as applicable, assess " weekly in progress notes under Dimension 3 and summarize in Discharge / Treatment summary under Dimension 3.     Additional Info as needed: There was no additional information to provide at this time.
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
## 2020-05-04 NOTE — PROGRESS NOTES
Initial Services Plan        Service Initiation Date: 5/4/2020    Immediate health and/or safety concerns: No    Identify health and safety concern(s) below and include plan to address:    None Identified    Treatment suggestions for client during the time between intake (admit date) and completion of the individual treatment plan:     Look for a sober support network, i.e. 12 step, [facility], [facility], etc  Tour the treatment center or outpatient clinic  Introduce yourself to your treatment group. Spend time getting to know your peers  Review your patient or client handbook  Begin working on your treatment goal list    Completed by: NANCI Royal  Date completed: 5/4/2020 at 12:14 PM

## 2020-05-04 NOTE — TELEPHONE ENCOUNTER
This patient was admitted to the [facility] program on 5/4/2020.  The [facility] referral paperwork was faxed to [facility] at [phone] on 5/4/2020 at 4:47 pm to activate the authorization for the LP program.  The patient's Assessment and Placement Summary Update was included with the [facility] referral paperwork.  This counselor will document the authorization in the insurance referral notes after the authorization has been received from [facility].
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
## 2020-05-04 NOTE — PROGRESS NOTES
[address] [address]                Assessment and Placement Summary Update     Patient name:   Annika Garrido   Patient phone: [phone] (home) none (work)   Last #:   [id number]   : 1965      PMI #: This patient does not have a PMI number.   Patient address:   [facility] [address] [facility] [address]     Date of Original Assessment / Last Update: 3/05/2020 Update Assessment Date: 2020   Updated by:   NANCI Royal    phone number: [phone]   Referred by:   [facility] / phone number: na   Referral to:   Lodging [facility] program at [facility] in [place], [place]   NPI: Geetha NPI #: [id number]   Summary:  This patient had a Rule  Combined Assessment Form on 3/5/2020 at [facility] completed by Mackenzie.  OUTSIDE: A paper copy of the Rule  Combined Assessment Form will be placed in the patient's paper chart until being scanned into the patient's electronic medical record in Epic under the Media tab.    Reason for today's update: Admission to  program2       Substance Use History Update:           X = Primary Drug Used   Age of First Use Most Recent Pattern of Use and Duration   Need enough information to show pattern (both frequency and amounts) and to show tolerance for each chemical that has a diagnosis   Date of last use and time, if needed   Withdrawal Potential? Requiring special care Method of use  (oral, smoked, snort, IV, etc)      Alcohol     18   Used daily for the past month 8 shots of hard alcohol 5/3/2020 no oral      Marijuana/  Hashish   [age] X in past month 1 blundt 5/3/2020 no smoked      Cocaine/Crack     [age] X in past month laced in blundt 5/3/2020 no smoked      Meth/  Amphetamines   No use          Heroin     No use          Other Opiates/  Synthetics   No use          Inhalants     No use          Benzodiazepines     No use          Hallucinogens     No use           Barbiturates/  Sedatives/  Hypnotics No use          Over-the-Counter Drugs   No use          Other     No use          Nicotine     No use         Dimension: Severity Rating/ Reason for Changes from Previous Assessment:  Dimension I: Acute intoxication/Withdrawal potential     Previous ratin Current ratin   Comments:   Patient denies any withdrawal symptoms. Does not appear to be in withdrawal.     Dimension II: Biomedical Conditions and Complications     Previous ratin Current ratin   Comments:   Patient is HIV positive and does not take her medication when she is using. Receives care at [facility].     Dimension III: Emotional/Behavioral/Cognitive     Previous ratin Current ratin   Comments:   Patient reports a diagnosis if bipolar. Does not take meds when using.     Dimension IV: Readiness for Change     Previous ratin Current ratin   Comments:   Patient reports wanting to make changes in her life. Voluntarily seeking help.     p   Previous rating:   3 Current rating:   3   Comments:   Patient reports not being able to stop using after previous treatments. Acknowledges living an unhealthy fife style.Longest period of sobriety 5 years.   Dimension VI: Recovery environment    Previous ratin Current ratin   Comments:   Patient reports isolating from her family which causes conflict. Conflict in family over circumstances of the death of her mother.     Summary of Assessment Update and Recommendations:   What was the outcome of last referral?  Continued use.     Reason for changes in the Risk Description since last assessment? No changes.     Recommendation and rationale for current request and significant issues that need to be addressed:    Abstain from mood altering chemicals.  Complete treatment at [facility].  Follow all recommendations of treatment staff.

## 2020-05-04 NOTE — TELEPHONE ENCOUNTER
----- Message from NANCI De La Vega sent at 5/4/2020  9:18 AM CDT -----  Regarding: Marisol Archer   Can you please check May crystal THAYER.

## 2020-05-04 NOTE — PROGRESS NOTES
Name: Annika Radhika  Date: 5/4/2020  Medical Record: [id number]    Envelope Number: [id number]    List of Contents (List each item separately in new row):     One cell phone with cracked screen    Admission:  I am responsible for any personal items that are not sent to the safe or pharmacy.  [facility] is not responsible for loss, theft or damage of any property in my possession.      Patient Signature:  ___________________________________________       Date/Time:__________________________    Staff Signature: __________________________________       Date/Time:__________________________    2nd Staff person, if patient is unable/unwilling to sign:      __________________________________________________________       Date/Time: __________________________      Discharge:  [facility] has returned all of my personal belongings:    Patient Signature: ________________________________________     Date/Time: ____________________________________    Staff Signature: ______________________________________     Date/Time:_____________________________________

## 2020-05-05 ENCOUNTER — HOSPITAL ENCOUNTER (OUTPATIENT)
Dept: BEHAVIORAL HEALTH | Facility: CLINIC | Age: 55
End: 2020-05-05
Attending: FAMILY MEDICINE
Payer: COMMERCIAL

## 2020-05-05 ENCOUNTER — PATIENT OUTREACH (OUTPATIENT)
Dept: ADDICTION MEDICINE | Facility: CLINIC | Age: 55
End: 2020-05-05

## 2020-05-05 VITALS — OXYGEN SATURATION: 100 % | TEMPERATURE: 98 F

## 2020-05-05 PROCEDURE — H2035 A/D TX PROGRAM, PER HOUR: HCPCS | Mod: HQ

## 2020-05-05 PROCEDURE — 99207 ZZC NON-BILLABLE SERV PER CHARTING: CPT | Performed by: PSYCHIATRY & NEUROLOGY

## 2020-05-05 PROCEDURE — 10020000 ZZH LODGING PLUS FACILITY CHARGE ADULT

## 2020-05-05 ASSESSMENT — ANXIETY QUESTIONNAIRES: GAD7 TOTAL SCORE: 0
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
## 2020-05-05 NOTE — PROGRESS NOTES
Comprehensive Summary Update and Review  Counselor met with patient on 5/5/2020 and reviewed the Comprehensive Assessment. Safety plan complete sent for scanning to pt's EHR.

## 2020-05-05 NOTE — CONSULTS
The chart was reviewed and patient discussed with nurse. However, the patient refused talk to me (the plan was to do visit with [facility] phone).   Sounds like she may be manic, but I checked pharmacy date base and she filled #90 of 1 mg Klonopin on April 20, so she may be at risk of withdrawal, although she said at intake that she wasn't using it very often, didn't think she was at risk of withdrawal.   U tox was negative for benzos, but on some tox screens clonazepam doesn't show up.   She should be taken to ED if she starts to look more disorganized, or appears to be going into withdrawal.   Page me at [phone] as needed.

## 2020-05-06 ENCOUNTER — PATIENT OUTREACH (OUTPATIENT)
Dept: ADDICTION MEDICINE | Facility: CLINIC | Age: 55
End: 2020-05-06

## 2020-05-06 ENCOUNTER — HOSPITAL ENCOUNTER (OUTPATIENT)
Dept: BEHAVIORAL HEALTH | Facility: CLINIC | Age: 55
End: 2020-05-06
Attending: FAMILY MEDICINE
Payer: COMMERCIAL

## 2020-05-06 VITALS — TEMPERATURE: 97.7 F | OXYGEN SATURATION: 100 %

## 2020-05-06 PROBLEM — F19.20 CHEMICAL DEPENDENCY (H): Status: ACTIVE | Noted: 2020-05-06

## 2020-05-06 PROCEDURE — H2035 A/D TX PROGRAM, PER HOUR: HCPCS | Mod: HQ

## 2020-05-06 PROCEDURE — 10020000 ZZH LODGING PLUS FACILITY CHARGE ADULT

## 2020-05-06 NOTE — TELEPHONE ENCOUNTER
Behavioral OP Authorization:  Initial    Authorizing Agency: [employer]  Authorizing Person: Garry De La Torre  Phone Number: [phone]  Level of Care: [facility]  Authorization Number: [id number]  Approved Services Dates:  From May 04, 2020 through October 31, 2020  Approval Details:  128 hours of    Quantity of Hours/Units: 128 hours of    Billing Code:

## 2020-05-06 NOTE — TELEPHONE ENCOUNTER
5/6 went to [facility] and met up with Annika she was really looking forward to our visit, We talk about her sobriety before her relapse, Today I just listened because it still seems like she wants to be heard and I appreciate her honesty in sharing some of what she's got going. She's already making plan for when she leaves [facility] will connect again on Friday feel like she'll be a good candidate for the MAT-PDOA Program

## 2020-05-07 ENCOUNTER — HOSPITAL ENCOUNTER (OUTPATIENT)
Dept: BEHAVIORAL HEALTH | Facility: CLINIC | Age: 55
End: 2020-05-07
Attending: FAMILY MEDICINE
Payer: COMMERCIAL

## 2020-05-07 VITALS — OXYGEN SATURATION: 99 % | TEMPERATURE: 98.5 F

## 2020-05-07 DIAGNOSIS — F17.200 NICOTINE DEPENDENCE: Primary | ICD-10-CM

## 2020-05-07 PROCEDURE — H2035 A/D TX PROGRAM, PER HOUR: HCPCS | Mod: HQ

## 2020-05-07 PROCEDURE — 10020000 ZZH LODGING PLUS FACILITY CHARGE ADULT

## 2020-05-07 NOTE — PROGRESS NOTES
"Pt came to [facility] RN office at approximately 1 pm with LP manager and counselor Mabel. Pt reports she would like to restart her psychiatric medications. Pt states,\"I am starting to hear voices again. They are nice right now, but they can get mean and make me suicidal.\"  Patient denies SI at this time. [facility] RN left  for patients [facility] psychiatry provider clinic (DRE obtained) to confirm current psych medications. [facility] Psychiatrist Dr. Meena Delgadillo called back and reported patient is currently taking: Seroquel 200mg once daily, Sertraline 100mg 2 tabs daily, and Abilify 2.5mg daily. This provider also recommends patient be on a Klonopin taper as she has been on Klonopin for an extended period of time. Pt refuses Topamax be reordered and her provider approved of this decision. RN updated [facility] program medical director Dr. Hernandez.  If patient has any further psychiatric medication issues patient's psychiatrist can be reached:  [facility] Psychiatrist Dr. Meena Delgadillo, clinic number: [phone].    Drocas Ni RN    "

## 2020-05-08 ENCOUNTER — HOSPITAL ENCOUNTER (OUTPATIENT)
Dept: BEHAVIORAL HEALTH | Facility: CLINIC | Age: 55
End: 2020-05-08
Attending: FAMILY MEDICINE
Payer: COMMERCIAL

## 2020-05-08 ENCOUNTER — DOCUMENTATION ONLY (OUTPATIENT)
Dept: ADDICTION MEDICINE | Facility: CLINIC | Age: 55
End: 2020-05-08

## 2020-05-08 VITALS
TEMPERATURE: 97.6 F | HEART RATE: 90 BPM | DIASTOLIC BLOOD PRESSURE: 97 MMHG | SYSTOLIC BLOOD PRESSURE: 149 MMHG | OXYGEN SATURATION: 98 %

## 2020-05-08 PROCEDURE — 10020000 ZZH LODGING PLUS FACILITY CHARGE ADULT

## 2020-05-08 PROCEDURE — H2035 A/D TX PROGRAM, PER HOUR: HCPCS | Mod: HQ

## 2020-05-08 NOTE — PROGRESS NOTES
Name: Annika Radhika  Date: 5/8/2020  Medical Record: [id number]  Envelope Number: [id number]  List of Contents (List each item separately in new row):   1 box of Nicotine Lozenge 2mg (3 containers inside the box)  Admission:  I am responsible for any personal items that are not sent to the safe or pharmacy.  [facility] is not responsible for loss, theft or damage of any property in my possession.  Patient Signature:  ___________________________________________       Date/Time:__________________________    Staff Signature: __________________________________       Date/Time:__________________________    2nd Staff person, if patient is unable/unwilling to sign:      __________________________________________________________       Date/Time: __________________________  Discharge:  [facility] has returned all of my personal belongings:  Patient Signature: ________________________________________     Date/Time: ____________________________________    Staff Signature: ______________________________________     Date/Time:_____________________________________

## 2020-05-08 NOTE — PROGRESS NOTES
Clinic Care Coordination Contact 5/8/20  Care Team Conversations    SW received referral from  Graciela. Pt is interested in MAT-[facility] program.     [facility].     SW has added self to pt care team.    SW will wait until pt has completed [facility] program and will complete MAT-[facility] intake upon pt's dischage from [facility].    NABILA Pittman   Ambulatory Care Coordination [facility]  Phone: [phone]  Email: [email or web address]  5/8/2020 1:06 PM

## 2020-05-09 ENCOUNTER — HOSPITAL ENCOUNTER (OUTPATIENT)
Dept: BEHAVIORAL HEALTH | Facility: CLINIC | Age: 55
End: 2020-05-09
Attending: FAMILY MEDICINE
Payer: COMMERCIAL

## 2020-05-09 VITALS — TEMPERATURE: 98.1 F | OXYGEN SATURATION: 99 %

## 2020-05-09 PROCEDURE — H2035 A/D TX PROGRAM, PER HOUR: HCPCS | Mod: HQ

## 2020-05-09 PROCEDURE — 10020000 ZZH LODGING PLUS FACILITY CHARGE ADULT

## 2020-05-09 NOTE — PROGRESS NOTES
"Pt stated to writer this morning that she forgot to take her Nicotine patch off of her arm on 5/5 and 5/6 and she had very \"real \" dreams.  She stated that the other day when she reported to [facility] (5/7) that she \"heard voices\" that she believes that it was because she forgot to take her Nicotine patch off of her arm.  Writer affirming pt that this makes sense.  Pt is taking all of her other medications as prescribed - Abilify and Sertraline   "

## 2020-05-09 NOTE — PROGRESS NOTES
"Writer approaching pt to ask her about her refusal to take Clonazepam taper written by community Provider Meena Delgadillo from [facility].  Pt stating she has already stopped the Clonazepam prior to coming to [facility] and is not going to take the medication.  Pt has shown no benzo withdrawal symptoms.  Pt stated that \"I'm going to wait until I get home to take it\"  Writer explained to pt that this medication was intended as a tapering \"off\" the medication as written by Provider.  Writer explained it is not prescribed any other way.   The pt stated she understood, but writer is not sure if she understands or just does not care.  None the less, pt has not self-administered any dosings while at [facility]    Writer faxed provider Meena ERICKSON and a note stating that pt is not taking the medication as prescribed.  Writer will also follow up with phone call to clinic on Monday    Writer alerting LP MD Kenroy Hernandez of the above as well    Will await advisement from pt Prescribing Provider on Monday  "

## 2020-05-10 ENCOUNTER — HOSPITAL ENCOUNTER (OUTPATIENT)
Dept: BEHAVIORAL HEALTH | Facility: CLINIC | Age: 55
End: 2020-05-10
Attending: FAMILY MEDICINE
Payer: COMMERCIAL

## 2020-05-10 VITALS — OXYGEN SATURATION: 97 % | TEMPERATURE: 98.3 F

## 2020-05-10 PROCEDURE — 10020000 ZZH LODGING PLUS FACILITY CHARGE ADULT

## 2020-05-10 PROCEDURE — H2035 A/D TX PROGRAM, PER HOUR: HCPCS | Mod: HQ

## 2020-05-11 ENCOUNTER — HOSPITAL ENCOUNTER (OUTPATIENT)
Dept: BEHAVIORAL HEALTH | Facility: CLINIC | Age: 55
End: 2020-05-11
Attending: FAMILY MEDICINE
Payer: COMMERCIAL

## 2020-05-11 VITALS — OXYGEN SATURATION: 98 % | TEMPERATURE: 98.6 F

## 2020-05-11 PROCEDURE — H2035 A/D TX PROGRAM, PER HOUR: HCPCS | Mod: HQ

## 2020-05-11 PROCEDURE — 10020000 ZZH LODGING PLUS FACILITY CHARGE ADULT

## 2020-05-11 NOTE — PROGRESS NOTES
Name: Annika Radhika  Date: 5/11/2020  Medical Record: [id number]    Envelope Number: n/[id number]    List of Contents (List each item separately in new row):   13 pills of Clonazepam, destroyed in [facility] container    Admission:  I am responsible for any personal items that are not sent to the safe or pharmacy.  [facility] is not responsible for loss, theft or damage of any property in my possession.      Patient Signature:  ___________________________________________       Date/Time:__________________________    Staff Signature: __________________________________       Date/Time:__________________________    2nd Staff person, if patient is unable/unwilling to sign:      __________________________________________________________       Date/Time: __________________________      Discharge:  [facility] has returned all of my personal belongings:    Patient Signature: ________________________________________     Date/Time: ____________________________________    Staff Signature: ______________________________________     Date/Time:_____________________________________

## 2020-05-11 NOTE — PROGRESS NOTES
Writer reaching out to [facility] Meena Delgadillo at [phone] regarding pt not taking benzo (Klonopin) taper as prescribed.  Pt states she wants to take the benzo script home.  Writer asking for provider advisement.  Await call back

## 2020-05-11 NOTE — PROGRESS NOTES
Writer received phone call from [facility] provider Meena Delgadillo  ([phone]) regarding pt not taking Clonazepam benzo taper as prescribed. Provider stated that since pt is not taking benzo as prescribed and not displaying any withdrawal symptoms that she would like the medication destroyed.  Writer confirmed and asked for this VO to also be sent in writing and faxed to [phone].     Eleanorr and another staff destroyed medication and called [facility] and had both Clonazepam refills de-activated by Pharmacist Jaquelin

## 2020-05-11 NOTE — PROGRESS NOTES
Patient: Annika Garrido            Adult CD Progress Note and Treatment Plan Review     Attendance  Please refer to OP BEH CD Adult Attendance Record Documentation Flowsheet    Support group attended this week: Yes    Reporting sobriety:  Yes    Treatment Plan     Treatment Plan Review completed on: 5/11/2020      Client preferred learning style: Hands on  Demonstration  Reading    Staff Members contributing: Elayne Castrejon [facility]; Mabel Victor [facility]; Susan Jacome [facility]                       Received Supervision: No    Client: Patient contributed to goals and plan.    Client received copy of plan/revised plan: Yes    Client agrees with plan/revised plan: Yes    Changes to Treatment Plan: None    New Goals added since last review: This is patient's first review; all goals are new    Goals worked on since last review: Sobriety, aftercare planning, group therapy, tx plan assignments, building a sober support network, psychoeducation.     Strategies effective: Yes    Strategies need these changes: No changes needed at this time    1) Care Coordination Activities: Patient will work with LP staff to develop an aftercare plan that is supportive of her recovery.  2) Medical, Mental Health and other appointments the client attended: None reported  3) Medication issues: Nursing staff has reported concerns about patient not taking benzodiazepine taper as prescribed.  Nursing staff is working with patient's prescriber to address concerns.  4) Physical and mental health problems: See dimensions 2 and 3 below.  5) Any changes in Vulnerable Adult Status?  No If yes, add to treatment plan and individual abuse prevention plan.  6) Review and evaluation of the individual abuse prevention plan: Current IAPP for this program is adequate for this client    ASAM Risk Rating:    Dimension 1, 0: Patient reports her substance of use as crack cocaine. Patient reports her last date of use as 5/3/2020. Patient denies any withdrawal symptoms  "that would interfere with full participation in treatment programming at this time. Patient will continue to be monitored throughout treatment.     Dimension 2, 1: Patient denies any biomedical concerns that would interfere with full participation in treatment programming at this time. Patient is HIV+ and reports that she is currently medication compliant, though does not maintain compliance when using. Patient appears able to access medical aid as needed.  Patient will continue to be monitored throughout treatment.     Dimension 3, 2: Patient reports a mental health diagnosis of bipolar disorder with psychotic features.  Patient has exhibited some potential signs of current remi or psychosis since arriving at [facility]; however, patient has remained compliant and has resumed taking her psychiatric medications.  Patient will continue to be monitored for any change in symptomology. Patient denies any current mood or stress level changes.  Patient reports \"taking the medication I need to manage my stress\" as her primary coping skills she uses to manage stress and difficult emotions.  Patient completed her assignment \"The Book of Me\" this past week.  Patient also attended Spiritual Care Group facilitated by Eduarda Hilton.  Patient denies any suicidal thoughts or ideations at this time. Patient will continue to be monitored throughout treatment.     Dimension 4, 2: Patient verbally reports being motivated for long-term recovery.  Patient appears ambivalent about making the necessary behavior and lifestyle changes that would support her recovery at this time. Patient should continue to work on increasing her internal motivation for change.   Patient identifies \"being sick and tired\" as her primary motivation to stay sober and in treatment this week.  Patient's group attendance and participation have been positive, though she has required redirection at times.    Dimension 5, 3: Patient rated her cravings on a scale " "from 1(low) to 10(high) as a \"1\" this week.  She attended the weekend Relapse Prevention workshop.  Patient should continue to work on identifying her personal relapse triggers, warning signs, and coping skills.    Dimension 6, 4: Patient's current aftercare plan includes outpatient treatment and returning to her home.  Patient has been encouraged to remain open-minded about her aftercare plan in order to best support her recovery.  Patient has been attending sober support meetings via Zoom and has been spending time with female peers.    Guide to Risk Ratings for Suicidality:   IDEATION: Active thoughts of suicide? INTENT: Intent to follow on suicide? PLAN: Plan to follow through on suicide? Level of Risk:   IF Yes Yes Yes Patient = High Emergent   IF Yes Yes No Patient = High Urgent/Non-Emergent   IF Yes No No Patient = Moderate Non-Urgent   IF No No   No Patient = Low Risk   The patient's ADDITIONAL RISK FACTORS and lack of PROTECTIVE FACTORS may increase their overall suicide risk ratings.     Patient's/client's current risk rating:  Low Risk    Family Involvement:   Patient reports maintaining phone contact with loved ones.    Data:   offered feedback client did participate    Intervention:   Behavior modification  Cognitive Behavioral Therapy  Counselor feedback  Education  Emotional management  Group feedback  Motivational Enhancement Therapy  Relapse prevention  Twelve Step facilitation    Assessment:   Stages of Change Model  Contemplation    Appears/Sounds:  Engaged  Ambivalent    Plan:  Focus on recovery environment  Monitor emotional/physical health  Continue working through treatment plan goals.   Continue group therapy, go to AA/NA meetings, work with sponsor, build sober support network, engage in daily structured activities, and have sober fun.    NANCI Joya  "

## 2020-05-12 ENCOUNTER — HOSPITAL ENCOUNTER (OUTPATIENT)
Dept: BEHAVIORAL HEALTH | Facility: CLINIC | Age: 55
End: 2020-05-12
Attending: FAMILY MEDICINE
Payer: COMMERCIAL

## 2020-05-12 VITALS — TEMPERATURE: 97.7 F | OXYGEN SATURATION: 98 %

## 2020-05-12 PROCEDURE — 10020000 ZZH LODGING PLUS FACILITY CHARGE ADULT

## 2020-05-12 PROCEDURE — H2035 A/D TX PROGRAM, PER HOUR: HCPCS

## 2020-05-12 PROCEDURE — H2035 A/D TX PROGRAM, PER HOUR: HCPCS | Mod: HQ

## 2020-05-12 NOTE — PROGRESS NOTES
INDIVIDUAL SESSION SUMMARY  D) Met with client on 5/12/20 from 10:00-11:00 am.      Client reported that she came to treatment at [facility] because she wanted to work on herself.  Client reported that she raised her granddaughter when she was a baby and that now she had a great grandson, and it's a lot for her to handle, since he is two years old.  Client reported that her daughter had a baby at 13 and she basically raised her grandchild. Client spoke about the fact that isolation and Covid does not help her addiction.  Client reported that coke is her main D.O.C., and that she has moments of clarity where she wants to get clean.  Client paced the room during the session. Client reported that she is sleeping well, and is taking several psychotropics.  Client reported being in a long-term relationship for 18 years. 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  Client reported that she has a garden and enjoys growing flowers and vegetables.  Client reported that she came from a large family of ten, but that only seven of them are alive right now.       I) Individual session with client provided client with verbal interventions including: validation, nurturing, compassion and support. Discussed the importance of recovery behaviors such as utilizing sponsorship, sober support network, daily rituals, and goal setting. Discussed the benefits of: healthy boundaries, positive self-talk, gratitude, self-compassion, assertive communication, identifying and verbalizing unmet needs, resiliency, and self-trust. Therapist provided support as client spoke of feeling frustrated with some of her health conditions.      A) Client appears to have trouble identifying emotions and to lack skills for emotional regulation and stress management. Client appears to struggle with regulating impulses and focusing attention. Client appears to have difficulty making decisions and planning for the future.  Client appears to lack a sober  support network. Client appears to lack a daily routine, meaningful activities, and a sense of purpose. Client appears to have good motivation at this time and would benefit from continuing support to help with processing past traumas, stress management, emotional regulation, impulse control, relapse prevention, increasing resiliency and self-esteem.     P) Next session is scheduled for the following week.    Kelly Mcleod, Student Intern   5/12/2020

## 2020-05-13 ENCOUNTER — PATIENT OUTREACH (OUTPATIENT)
Dept: ADDICTION MEDICINE | Facility: CLINIC | Age: 55
End: 2020-05-13

## 2020-05-13 ENCOUNTER — HOSPITAL ENCOUNTER (OUTPATIENT)
Dept: BEHAVIORAL HEALTH | Facility: CLINIC | Age: 55
End: 2020-05-13
Attending: FAMILY MEDICINE
Payer: COMMERCIAL

## 2020-05-13 VITALS — OXYGEN SATURATION: 97 % | TEMPERATURE: 98.1 F

## 2020-05-13 PROCEDURE — 10020000 ZZH LODGING PLUS FACILITY CHARGE ADULT

## 2020-05-13 PROCEDURE — H2035 A/D TX PROGRAM, PER HOUR: HCPCS | Mod: HQ

## 2020-05-13 NOTE — PROGRESS NOTES
Pt informed writer that she is not going to take Abilify or Seroquel and has not taken any dosing's in the last 3-4 days.  Pt denied hearing any voices, she just said she does not want to take them.     Pt also came to writer this morning stating that she wants to take Klonopin.  Writer explained to pt again that she cannot take Klonopin while here at [facility].  And that her taper has been discontinued and destroyed via her Provider Menea Delgadillo ([phone])  Pt went on to say that her partner intends to pick-up her Klonopin in the community on  5/17/2020 and bring them to [facility].  Writer stated that she still would not be able to take them while at [facility].  Pt has 2 active prescriptions of Klonopin at [facility] (#[facility]) on [place], [place] ([phone]).  90 , 1mg tabs with each prescription.    Writer brought pt into office and asked if she was having some anxiety and needed to talk to her Provider about other medication options.  Pt said she was having a little anxiety.  Pt stated she had been taking Klonopin for years and this is what she wants to take and walked out of the office.    Will inform her [facility] counselors to see if they can speak with her.  We could set her up with her MH provider telephonically if she chooses while here at [facility]     0915am - Writer called at Meena CORONEL at [phone] reporting the above.  Also, faxed this note to [facility] at  [phone]       If patient has any further psychiatric medication issues patient's psychiatrist can be reached:  [facility] Psychiatrist Dr. Meena Delgadillo, clinic number: [phone].   DRE on file

## 2020-05-13 NOTE — TELEPHONE ENCOUNTER
5/13 I've been following along with Annika since she has been in [facility]. she is a very nice women pretty openminded we been meeting regularly she's been expressing her concerns in different areas of her life and has been sharing with me that she will be traveling to see family after treatment,I spoke with her regarding partiapating in the MAT-PDOA Program and she was really interested will be following up with her today and giving her information on the program have an appt with her today at 3:00

## 2020-05-14 ENCOUNTER — HOSPITAL ENCOUNTER (OUTPATIENT)
Dept: BEHAVIORAL HEALTH | Facility: CLINIC | Age: 55
End: 2020-05-14
Attending: FAMILY MEDICINE
Payer: COMMERCIAL

## 2020-05-14 VITALS — OXYGEN SATURATION: 98 % | TEMPERATURE: 97.4 F

## 2020-05-14 PROCEDURE — H2035 A/D TX PROGRAM, PER HOUR: HCPCS | Mod: HQ

## 2020-05-14 PROCEDURE — 10020000 ZZH LODGING PLUS FACILITY CHARGE ADULT

## 2020-05-15 ENCOUNTER — HOSPITAL ENCOUNTER (OUTPATIENT)
Dept: BEHAVIORAL HEALTH | Facility: CLINIC | Age: 55
End: 2020-05-15
Attending: FAMILY MEDICINE
Payer: COMMERCIAL

## 2020-05-15 VITALS — TEMPERATURE: 98.1 F | OXYGEN SATURATION: 99 %

## 2020-05-15 PROCEDURE — 10020000 ZZH LODGING PLUS FACILITY CHARGE ADULT

## 2020-05-15 PROCEDURE — H2035 A/D TX PROGRAM, PER HOUR: HCPCS | Mod: HQ

## 2020-05-16 ENCOUNTER — HOSPITAL ENCOUNTER (OUTPATIENT)
Dept: BEHAVIORAL HEALTH | Facility: CLINIC | Age: 55
End: 2020-05-16
Attending: FAMILY MEDICINE
Payer: COMMERCIAL

## 2020-05-16 VITALS — OXYGEN SATURATION: 100 % | TEMPERATURE: 98.1 F

## 2020-05-16 PROCEDURE — 10020000 ZZH LODGING PLUS FACILITY CHARGE ADULT

## 2020-05-16 PROCEDURE — H2035 A/D TX PROGRAM, PER HOUR: HCPCS | Mod: HQ

## 2020-05-17 ENCOUNTER — HOSPITAL ENCOUNTER (OUTPATIENT)
Dept: BEHAVIORAL HEALTH | Facility: CLINIC | Age: 55
End: 2020-05-17
Attending: FAMILY MEDICINE
Payer: COMMERCIAL

## 2020-05-17 PROCEDURE — H2035 A/D TX PROGRAM, PER HOUR: HCPCS | Mod: HQ

## 2020-05-17 PROCEDURE — 10020000 ZZH LODGING PLUS FACILITY CHARGE ADULT

## 2020-05-17 NOTE — PROGRESS NOTES
Patient met with program  to review and initiate aftercare placement opportunities. Patient presents as a high risk for relapse with limited independent sober living skills. Patient presents with good insight into her challenges with co-occurring disorders. Patient indicates a desire to return to DOP programming offered through [facility]. Patient has a mental health care provider, Toney Jain, through [facility]. Program  has been making efforts to contact him to assist in program placement.

## 2020-05-18 ENCOUNTER — HOSPITAL ENCOUNTER (OUTPATIENT)
Dept: BEHAVIORAL HEALTH | Facility: CLINIC | Age: 55
End: 2020-05-18
Attending: FAMILY MEDICINE
Payer: COMMERCIAL

## 2020-05-18 VITALS — TEMPERATURE: 97.2 F | OXYGEN SATURATION: 100 %

## 2020-05-18 VITALS — TEMPERATURE: 97.3 F | OXYGEN SATURATION: 100 %

## 2020-05-18 PROCEDURE — H2035 A/D TX PROGRAM, PER HOUR: HCPCS | Mod: HQ

## 2020-05-18 PROCEDURE — 10020000 ZZH LODGING PLUS FACILITY CHARGE ADULT

## 2020-05-19 ENCOUNTER — HOSPITAL ENCOUNTER (OUTPATIENT)
Dept: BEHAVIORAL HEALTH | Facility: CLINIC | Age: 55
End: 2020-05-19
Attending: FAMILY MEDICINE
Payer: COMMERCIAL

## 2020-05-19 VITALS — TEMPERATURE: 97.7 F | OXYGEN SATURATION: 100 %

## 2020-05-19 PROCEDURE — H2035 A/D TX PROGRAM, PER HOUR: HCPCS

## 2020-05-19 PROCEDURE — 10020000 ZZH LODGING PLUS FACILITY CHARGE ADULT

## 2020-05-19 PROCEDURE — H2035 A/D TX PROGRAM, PER HOUR: HCPCS | Mod: HQ

## 2020-05-19 NOTE — PROGRESS NOTES
INDIVIDUAL SESSION SUMMARY    D) Met with client on 5/19/20 from 10:00-11:00 am.     Client reported concerns about making sure that Klonopin prescribed to her. Client reported that the doctors don't want to prescribe her this anymore because they tapered her of this medication. Client reported that she worries about panic attacks and that is why she feels she needs to have a constant supply of this RX. Client spoke about leaving [facility] and that she wanted to be accepted into section 8 housing in [facility], so that she can have the flexibility of where she can live. Client spoke about her sister who is getting out of residential in 2023, after being in penitentiary for over 20 years for the murder of her mother. Client reported that she has a close relationship with her sister.  Client reported that she knows that she needs to work on her mental health issues and hopes that she can find an outpatient facility that will help her with that after [facility].      I) Individual session with client provided client with verbal interventions including: validation, nurturing, compassion and support. Client is being resourceful and contemplating her aftercare plan. Client reported that she understands her relapse prevention tools of meetings, sponsor and sober network.        A)  Client appears to have trouble identifying emotions and to lack skills for emotional regulation and stress management. Client appears to struggle with regulating impulses and focusing attention. Client appears to be working on making decisions and planning for the future.  Client appears to be contemplating meaningful activities, and a sense of purpose. Client appears to have good motivation at this time and would benefit from continuing support to help with processing past traumas, stress management, emotional regulation, impulse control, relapse prevention, increasing resiliency and self-esteem.     P) Next session is scheduled for the following week.     Kelly Mcleod, Student  Intern  5/19/2020

## 2020-05-19 NOTE — PROGRESS NOTES
"Patient:  Annika Garrido            Adult CD Progress Note and Treatment Plan Review     Attendance  Please refer to OP BEH CD Adult Attendance Record Documentation Flowsheet    Support group attended this week: yes    Reporting sobriety:  Yes    Treatment Plan     Treatment Plan Review competed on: 5/19/2020  This review covers 5/11-5/19/2020      Client preferred learning style: Hands on  Verbal  Demonstration and Reading.    Staff Members contributing  Elayne Castrejon [facility]; Mabel Victor [facility]; Susan Jacome [facility]                      and  Shani Montemayor [facility].       Received Supervision: Yes    Client: contributed to goals and plan.    Client received copy of plan/revised plan: Yes    Client agrees with plan/revised plan: Yes    Changes to Treatment Plan: No    New Goals added since last review No    Goals worked on since last review: Pt completed her assignments \"Understanding Bi-polar disorder and addition\", \"Coping skills\", Emotional Abuse\" and Relapse triggers.     Strategies effective: yes    Strategies need these changes: Continue to address goals.    1) Care Coordination Activities:Patient met with program  to review and initiate aftercare placement opportunities on 5/17/2020    2) Medical, Mental Health and other appointments the client attended: Pt had an individual session with staff therapist.  3) Medication issues: None reported.  4) Physical and mental health problems: See dimension 2/3  5) Any changes in Vulnerable Adult Status?  No  6) Review and evaluation of the individual abuse prevention plan: Yes      Guide to Risk Ratings for Suicidality:   IDEATION: Active thoughts of suicide? INTENT: Intent to follow on suicide? PLAN: Plan to follow through on suicide? Level of Risk:   IF Yes Yes Yes Patient = High Emergent   IF Yes Yes No Patient = High Urgent/Non-Emergent   IF Yes No No Patient = Moderate Non-Urgent   IF No No   No Patient = Low Risk   The patient's ADDITIONAL RISK FACTORS and " "lack of PROTECTIVE F'ACTORS may increase their overall suicide risk ratings.     Patient's/client's current risk rating:  Low Risk      ASAM Risk Rating:    Dimension 1 Risk 0; Pt reports last use as 5/3/2020. Pt reports some anxiety and that she has been trying all \"My might to control my anxiety\", as  withdrawal symptoms this past week.     Dimension 2 Risk 1; Patient is HIV+ and reports that she is currently medication compliant. Pt reports  she is withdrawing from clonazepam as medical problems this past week. Pt attended lecture given by  nurse on HIV/AIDS on 5/17/2020.     Dimension 3 Risk 2; Patient reports a mental health diagnosis of bipolar disorder with psychotic features. Pt's suicide risk assessment on admission was \"Very low risk\"  Pt denies  any thoughts of self-harm or suicide ideation at this time. Pt's current CGI 5/4.  Pt states \"She is very crackle, very upset very hyper\" in mood this past week.  Pt reports \"My stress level has been up to the roof, looking forward to seeing Marianna Hernandez to put me back on clonazepam.\" Pt reports coping skills to manage stress used were, \"Writing and building up in knot\". Pt completed her assignments on \"Coping skills\", Emotional Abuse\" and Understanding Bi-polar disorder and addition\",  She presented all three in group therapy. Pt reports she is having her mental health  check into getting a therapist post [place]. Pt had an individual session with staff therapist.      Dimension 4 Risk 2; Pt reports her motivation this week is \"Myself\". Pt attended all group therapy this past week and was engaged in group discussion.    Dimension 5 Risk 4; Pt reports no cravings this past week.The coping skill she used was praying. Pt participated in the spirituality group, facilitated by Eduarda Lowery with staff counselor present during group. Pt completed her assignment on \"Relapse Triggers\" and shared in group therapy, she was open to feedback from peers and " staff.    Dimension 6  Risk 3;  Pt is spending free time with female peers and attending at least 3; 12-step meetings weekly while in [place].  Pt participated in weekend workshop on relationships and completed all activities. Pt reports her aftercare plan is to do outpatient mental health and attend meetings. Patient met with program  to review and initiate aftercare placement opportunities on 5/17/2020.    Family Involvement:   Pt had contact with spouse      Data:   good insight client did participate    Intervention:   Aftercare planning  Behavior modification  Counselor feedback  Education  Emotional management  Group feedback  Motivational Enhancement Therapy  Relapse prevention  Twelve Step facilitation  Mental health education      Assessment:   Stages of Change Model  Contemplation    Appears/Sounds:  Cooperative  Motivated  Engaged      Plan:  Focus on recovery environment  Monitor emotional/physical health  Continue working through treatment plan goals.    NANCI Loya

## 2020-05-20 ENCOUNTER — HOSPITAL ENCOUNTER (OUTPATIENT)
Dept: BEHAVIORAL HEALTH | Facility: CLINIC | Age: 55
End: 2020-05-20
Attending: FAMILY MEDICINE
Payer: COMMERCIAL

## 2020-05-20 PROCEDURE — 10020000 ZZH LODGING PLUS FACILITY CHARGE ADULT

## 2020-05-20 PROCEDURE — H2035 A/D TX PROGRAM, PER HOUR: HCPCS | Mod: HQ

## 2020-05-20 NOTE — PROGRESS NOTES
Name: Annika Radhika  Date: 5/20/2020  Medical Record: [id number]    Envelope Number: [id number]    List of Contents (List each item separately in new row):     Quetiapine Fumarate 200 mg tab     Admission:  I am responsible for any personal items that are not sent to the safe or pharmacy.  [facility] is not responsible for loss, theft or damage of any property in my possession.      Patient Signature:  ___________________________________________       Date/Time:__________________________    Staff Signature: __________________________________       Date/Time:__________________________    2nd Staff person, if patient is unable/unwilling to sign:      __________________________________________________________       Date/Time: __________________________      Discharge:  [facility] has returned all of my personal belongings:    Patient Signature: ________________________________________     Date/Time: ____________________________________    Staff Signature: ______________________________________     Date/Time:_____________________________________

## 2020-05-21 ENCOUNTER — HOSPITAL ENCOUNTER (OUTPATIENT)
Dept: BEHAVIORAL HEALTH | Facility: CLINIC | Age: 55
End: 2020-05-21
Attending: FAMILY MEDICINE
Payer: COMMERCIAL

## 2020-05-21 ENCOUNTER — HOSPITAL ENCOUNTER (OUTPATIENT)
Dept: BEHAVIORAL HEALTH | Facility: CLINIC | Age: 55
Discharge: HOME | End: 2020-05-21
Attending: PSYCHIATRY & NEUROLOGY | Admitting: PSYCHIATRY & NEUROLOGY
Payer: COMMERCIAL

## 2020-05-21 VITALS — OXYGEN SATURATION: 99 % | TEMPERATURE: 97.7 F

## 2020-05-21 VITALS — TEMPERATURE: 97.1 F | OXYGEN SATURATION: 100 %

## 2020-05-21 PROCEDURE — 90791 PSYCH DIAGNOSTIC EVALUATION: CPT | Mod: TEL | Performed by: SOCIAL WORKER

## 2020-05-21 PROCEDURE — 10020000 ZZH LODGING PLUS FACILITY CHARGE ADULT

## 2020-05-21 PROCEDURE — H2035 A/D TX PROGRAM, PER HOUR: HCPCS | Mod: HQ

## 2020-05-21 ASSESSMENT — PATIENT HEALTH QUESTIONNAIRE - PHQ9
SUM OF ALL RESPONSES TO PHQ QUESTIONS 1-9: 9
5. POOR APPETITE OR OVEREATING: MORE THAN HALF THE DAYS

## 2020-05-21 ASSESSMENT — ANXIETY QUESTIONNAIRES
2. NOT BEING ABLE TO STOP OR CONTROL WORRYING: NEARLY EVERY DAY
IF YOU CHECKED OFF ANY PROBLEMS ON THIS QUESTIONNAIRE, HOW DIFFICULT HAVE THESE PROBLEMS MADE IT FOR YOU TO DO YOUR WORK, TAKE CARE OF THINGS AT HOME, OR GET ALONG WITH OTHER PEOPLE: EXTREMELY DIFFICULT
GAD7 TOTAL SCORE: 20
5. BEING SO RESTLESS THAT IT IS HARD TO SIT STILL: NEARLY EVERY DAY
7. FEELING AFRAID AS IF SOMETHING AWFUL MIGHT HAPPEN: NEARLY EVERY DAY
1. FEELING NERVOUS, ANXIOUS, OR ON EDGE: NEARLY EVERY DAY
3. WORRYING TOO MUCH ABOUT DIFFERENT THINGS: NEARLY EVERY DAY
6. BECOMING EASILY ANNOYED OR IRRITABLE: NEARLY EVERY DAY

## 2020-05-21 NOTE — PATIENT INSTRUCTIONS
"Outpatient Mental Health Services - Adult     MY COPING PLAN FOR SAFETY     PATIENT'S NAME:           Annika Garrido  MRN:                                  [id number]  SAFETY PLAN:  Step 1: Warning signs / cues (Thoughts, images, mood, situation, behavior) that a crisis may be developing:  ? Thoughts: \"People would be better off without me\", \"I'm a burden\" and \"I can't do this anymore\"  ? Images: no  ? Thinking Processes: racing thoughts and auditory hallucinations  ? Mood: worsening depression, helplessness, intense worry, agitation and mood swings  ? Behaviors: isolating/withdrawing , using drugs, using alcohol, not taking care of myself, not taking care of my responsibilities and not sleeping enough  ? Situations: medical condition / diagnosis: HIV past relapses   Step 2: Coping strategies - Things I can do to take my mind off of my problems without contacting another person (relaxation technique, physical activity):  ? Distress Tolerance Strategies:  read a book: and uses a rubberband wrist to help withpanic attacks.  ? Physical Activities: go for a walk and run with dog.  ? Focus on helpful thoughts:  \"This is temporary\", \"I will get through this\" and \"It always passes\"  Step 3: People and social settings that provide distraction:                                Family members, golden vargas @ [phone]   daughter, SO                 go for walk with dog         Step 4: Remind myself of people and things that are important to me and worth living for:  \" my children and grandkids and spouse and self\".  Step 5: When I am in crisis, I can ask these people to help me use my safety plan:                 Name:   Phone: Name: Toney Rojas CM              Phone: [phone] [facility]                 Keeps all crisis number in cell phone and address book.   Step 6: Making the environment safe:   ? be around others  Step 7: Professionals or agencies I can contact during a crisis:  ? Suicide Prevention Lifeline: " 4-727-126-TALK (0061)  ? Crisis Text Line Service (available 24 hours a day, 7 days a week): Text [place] to 617402  ? Call  **CRISIS (400062) from a cell phone to talk to a team of professionals who can help you.  Crisis Services By [facility]: Phone Number:   [place]     [phone]   [place]    [phone]   [place]    [phone]   [place]    [phone]   [place]    [phone]   [place] [phone]   [place]     [phone]      ? Call 911 or go to my nearest emergency department.              I helped develop this safety plan and agree to use it when needed.  I have been given a copy of this plan.       Client signature _________________________________________________________________  Today s date:  5/21/2020

## 2020-05-21 NOTE — PROGRESS NOTES
Completed eval. Client adamantly stating she does not want to attend [facility] IOP. Reported she could not commit to 5 days a week and also plans to re-start Klonopin.   We agreed she could attend 55+ A1 ( m, T, Th) 900-noon with the condition that should she relapse and/or her drug and alc use become problematic she would be referred to [facility]. She agreed to this.  She is scheduled to start Mon June 1.  Please coordinate her start with [facility] buck #[id number], manager of 55+.

## 2020-05-21 NOTE — PROGRESS NOTES
"Outpatient Mental Health Services - Adult    MY COPING PLAN FOR SAFETY    PATIENT'S NAME: Annika Garrido  MRN:   [id number]  SAFETY PLAN:  Step 1: Warning signs / cues (Thoughts, images, mood, situation, behavior) that a crisis may be developing:    Thoughts: \"People would be better off without me\", \"I'm a burden\" and \"I can't do this anymore\"    Images: no    Thinking Processes: racing thoughts and auditory hallucinations    Mood: worsening depression, helplessness, intense worry, agitation and mood swings    Behaviors: isolating/withdrawing , using drugs, using alcohol, not taking care of myself, not taking care of my responsibilities and not sleeping enough    Situations: medical condition / diagnosis: HIV past relapses   Step 2: Coping strategies - Things I can do to take my mind off of my problems without contacting another person (relaxation technique, physical activity):    Distress Tolerance Strategies:  read a book: and uses a rubberband wrist to help withpanic attacks.    Physical Activities: go for a walk and run with dog.    Focus on helpful thoughts:  \"This is temporary\", \"I will get through this\" and \"It always passes\"  Step 3: People and social settings that provide distraction:    Family members, golden vargas @ [phone]   daughter, SO   go for walk with dog   Step 4: Remind myself of people and things that are important to me and worth living for:  \" my children and grandkids and spouse and self\".  Step 5: When I am in crisis, I can ask these people to help me use my safety plan:   Name:  Phone: Name: Toney Rojas CM Phone: [phone] [facility]   Keeps all crisis number in cell phone and address book.   Step 6: Making the environment safe:     be around others  Step 7: Professionals or agencies I can contact during a crisis:    Suicide Prevention Lifeline: 9-977-800-HRFB (8522)    Crisis Text Line Service (available 24 hours a day, 7 days a week): Text [place] to 961289    Call  **CRISIS " (289708) from a cell phone to talk to a team of professionals who can help you.  Crisis Services By [place]: Phone Number:   [place]     [phone]   [place]    [phone]   [place]    [phone]   [place]    [phone]   [place]    [phone]   [place] [phone]   [place]     [phone]       Call 911 or go to my nearest emergency department.     I helped develop this safety plan and agree to use it when needed.  I have been given a copy of this plan.      Client signature _________________________________________________________________  Today s date:  5/21/2020  Adapted from Safety Plan Template 2008 Andie Cox and Pablo Keith is reprinted with the express permission of the authors.  No portion of the Safety Plan Template may be reproduced without the express, written permission.  You can contact the authors at [email or web address] or [email or web address].

## 2020-05-21 NOTE — PROGRESS NOTES
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 being slowed down).  6. Fatigue or loss of energy nearly every day.  7. Feelings of worthlessness or excessive or inappropriate guilt (which may be delusional) nearly every day (not merely self-reproach or guilt about being sick).  8. Diminished ability to think or concentrate, or indecisiveness, nearly every day (either by subjective account or as observed by others).  9. Recurrent thoughts of death (not just fear of dying), recurrent suicidal ideation without a specific plan, or a suicide attempt or a specific plan for committing suicide.  B. The symptoms cause clinically significant distress or impairment in social, occupational, or other important areas of functioning.  C. The episode is not attributable to the physiological effects of a substance or another medical condition.   Note: Criteria A-C constitute a major depressive episode. Major depressive episodes are common in bipolar I disorder but are not required for the diagnosis of bipolar I disorder.  Functional Status:  Patient reports the following functional impairments: chronic disease management, management of the household and or completion of tasks, relationship(s), social interactions, use of public transportation and work / vocational responsibilities.     WHODAS:   WHODAS 2.0 Total Score 5/21/2020   Total Score 29        Clinical Summary:  1. Reason for assessment: referred from + as part of aftercare  .  2. Psychosocial, Cultural and Contextual Factors: hx of trauma, auditory hallucnations, past hx of suicide attempt 15 years ago, non adherence when abusing drugs and alcohol.  3. As evidenced by self report and criteria, client meets the following DSM5 Diagnoses:   (Sustained by DSM5 Criteria Listed Above)  296.54 Bipolar I Disorder Current or Most Recent Episode Depressed, with psychotic features.  Other Diagnoses that is relevant to services: Substance-Related & Addictive Disorders Alcohol Use Disorder   303.90 (F10.20) Severe In early remission,   Stimulant Use Disorder:  In early remission, , Specify current severity:  Moderate  304.20 (F14.20) Cocaine.  4. R/O: 309.81 (F43.10) Posttraumatic Stress Disorder (includes Posttraumatic Stress Disorder for Children 6 Years and Younger)  With dissociative symptoms due to not enough criteria at this time. .   5. Provisional Diagnosis:  309.81 (F43.10) Posttraumatic Stress Disorder (includes Posttraumatic Stress Disorder for Children 6 Years and Younger)  With dissociative symptoms as evidenced by past trauma, severe mental health issues including anxiety, panic, insomnia, remi in past with psychosis. .  6. Prognosis: Expect Improvement.  7. Likely consequences of symptoms if not treated: need for higher level of care.  8. Client strengths include:  has a previous history of therapy, motivated, open to learning, open to suggestions / feedback, responsible parent, support of family, friends and providers, wants to learn, willing to ask questions and willing to relate to others .     Recommendations:     1. Plan for Safety and Risk Management:A safety and risk management plan has been developed including: Patient consented to co-developed safety plan.  Safety and risk management plan was completed.  Patient agreed to use safety plan should any safety concerns arise.  A copy was  given to the patient..  Report to child / adult protection services was NA.     2. Patient's identified none identified.     3. Initial Treatment will focus on: Mood Instability - manage symptoms of bipolar disorder including auditory hallucinations, use safety plan as needed.  Alcohol / Substance Use - maintain abstincne.     4. Resources/Service Plan:       services are not indicated.     Modifications to assist communication are not indicated.     Additional disability accommodations are not indicated.      5. Collaboration:  Collaboration / coordination of treatment will be initiated with the following support professionals: [facility].      6.  Referrals:  The following referral(s) will be initiated: 55+ Senior Outpatient Program. Next Scheduled Appointment: June 1.  A Release of Information has been obtained for the following: case management and psychiatry.    7. TONIA: TONIA:  Discussed the general effects of drugs and alcohol on health and well-being. Provider gave patient printed information about the effects of chemical use on their health and well being. Recommendations:   .     8. Records were reviewed at time of assessment.  Information in this assessment was obtained from the medical record and provided by patient who is a limited historian.   Patient will have open access to their mental health medical record.      Eval type:  Mental Health    Staff Name/Credentials:  PADMA Keita    May 21, 2020

## 2020-05-22 ENCOUNTER — HOSPITAL ENCOUNTER (OUTPATIENT)
Dept: BEHAVIORAL HEALTH | Facility: CLINIC | Age: 55
End: 2020-05-22
Attending: FAMILY MEDICINE
Payer: COMMERCIAL

## 2020-05-22 VITALS — OXYGEN SATURATION: 98 % | TEMPERATURE: 98.2 F

## 2020-05-22 PROCEDURE — 10020000 ZZH LODGING PLUS FACILITY CHARGE ADULT

## 2020-05-22 PROCEDURE — H2035 A/D TX PROGRAM, PER HOUR: HCPCS | Mod: HQ

## 2020-05-22 ASSESSMENT — ANXIETY QUESTIONNAIRES: GAD7 TOTAL SCORE: 20

## 2020-05-23 ENCOUNTER — HOSPITAL ENCOUNTER (OUTPATIENT)
Dept: BEHAVIORAL HEALTH | Facility: CLINIC | Age: 55
End: 2020-05-23
Attending: FAMILY MEDICINE
Payer: COMMERCIAL

## 2020-05-23 VITALS — TEMPERATURE: 97.3 F | OXYGEN SATURATION: 98 %

## 2020-05-23 PROCEDURE — H2035 A/D TX PROGRAM, PER HOUR: HCPCS | Mod: HQ

## 2020-05-23 PROCEDURE — 10020000 ZZH LODGING PLUS FACILITY CHARGE ADULT

## 2020-05-24 ENCOUNTER — HOSPITAL ENCOUNTER (OUTPATIENT)
Dept: BEHAVIORAL HEALTH | Facility: CLINIC | Age: 55
End: 2020-05-24
Attending: FAMILY MEDICINE
Payer: COMMERCIAL

## 2020-05-24 VITALS — TEMPERATURE: 98.5 F | OXYGEN SATURATION: 99 %

## 2020-05-24 PROCEDURE — 10020000 ZZH LODGING PLUS FACILITY CHARGE ADULT

## 2020-05-24 PROCEDURE — H2035 A/D TX PROGRAM, PER HOUR: HCPCS | Mod: HQ

## 2020-05-25 ENCOUNTER — HOSPITAL ENCOUNTER (OUTPATIENT)
Dept: BEHAVIORAL HEALTH | Facility: CLINIC | Age: 55
End: 2020-05-25
Attending: FAMILY MEDICINE
Payer: COMMERCIAL

## 2020-05-25 VITALS — TEMPERATURE: 97.8 F | OXYGEN SATURATION: 96 %

## 2020-05-25 PROCEDURE — 10020000 ZZH LODGING PLUS FACILITY CHARGE ADULT

## 2020-05-25 PROCEDURE — H2035 A/D TX PROGRAM, PER HOUR: HCPCS | Mod: HQ

## 2020-05-26 ENCOUNTER — HOSPITAL ENCOUNTER (OUTPATIENT)
Dept: BEHAVIORAL HEALTH | Facility: CLINIC | Age: 55
End: 2020-05-26
Attending: FAMILY MEDICINE
Payer: COMMERCIAL

## 2020-05-26 VITALS — OXYGEN SATURATION: 98 % | TEMPERATURE: 97.4 F

## 2020-05-26 PROCEDURE — H2035 A/D TX PROGRAM, PER HOUR: HCPCS | Mod: HQ

## 2020-05-26 PROCEDURE — H2035 A/D TX PROGRAM, PER HOUR: HCPCS

## 2020-05-26 PROCEDURE — 10020000 ZZH LODGING PLUS FACILITY CHARGE ADULT

## 2020-05-26 NOTE — PROGRESS NOTES
"Patient:  Annika Garrido            Adult CD Progress Note and Treatment Plan Review     Attendance  Please refer to OP BEH CD Adult Attendance Record Documentation Flowsheet    Support group attended this week: yes    Reporting sobriety:  Yes    Treatment Plan     Treatment Plan Review competed on: 5/26/2020  This review covers 5/19-5/26/2020      Client preferred learning style: Hands on  Verbal  Demonstration and Reading.    Staff Members contributing  Elayne Castrejon [facility]; Mabel Victor [facility]; Susan Jacome [facility],  and  Shani Montemayor [facility].       Received Supervision: Yes    Client: contributed to goals and plan.    Client received copy of plan/revised plan: Yes    Client agrees with plan/revised plan: Yes    Changes to Treatment Plan: No    New Goals added since last review No    Goals worked on since last review: Pt completed her assignments: \"Relapse prevention\" and \"Shame and Guilt\".    Strategies effective: yes    Strategies need these changes: Continue to address goals.    1) Care Coordination Activities: Patient met with her  Geno Price on phone to review and initiate aftercare placement opportunities on 5/21/2020    2) Medical, Mental Health and other appointments the client attended: Pt had an individual session with staff therapist 5/19/2020.  3) Medication issues: Stopped Abilify and upped Seroquel.  4) Physical and mental health problems: See dimension 2/3  5) Any changes in Vulnerable Adult Status?  No  6) Review and evaluation of the individual abuse prevention plan: Yes      Guide to Risk Ratings for Suicidality:   IDEATION: Active thoughts of suicide? INTENT: Intent to follow on suicide? PLAN: Plan to follow through on suicide? Level of Risk:   IF Yes Yes Yes Patient = High Emergent   IF Yes Yes No Patient = High Urgent/Non-Emergent   IF Yes No No Patient = Moderate Non-Urgent   IF No No   No Patient = Low Risk   The patient's ADDITIONAL RISK FACTORS and lack of PROTECTIVE F'ACTORS " "may increase their overall suicide risk ratings.     Patient's/client's current risk rating:  Low Risk    ASAM Risk Rating:    Dimension 1 Risk 0; Pt reports last use as 5/3/2020. Pt reports no  withdrawal symptoms this past week. She states \"am good\"   Dimension 2 Risk 1; Patient is HIV+ and reports that she is currently medication compliant. Pt reports, she was stopped from taking Abilify and her Seroquel has been increased. She reports no medical problems this past week. Pt attended lecture given by LP nurse on TB/Hep ABC on 5/24/2020.     Dimension 3 Risk 2; Patient reports a mental health diagnosis of bipolar disorder with psychotic features. Pt's suicide risk assessment on admission was \"Very low risk\"  Pt denies  any thoughts of self-harm or suicide ideation at this time. Pt's current CGI 5/5.  Pt states \" I'm always in a good mood\". Pt denies any increase in stress level. Pt reports coping skills to manage stress used were\"I got all my out-patient line up\".  Pt had an individual session with staff therapist on 5/19/2020. Next appointment is on 5/26/2020.    Dimension 4 Risk 2; Pt reports her motivation this week is \"Me, myself and I, and learning the tools here in treatment\". Pt has been present and engage in group therapy this past week.    Dimension 5 Risk 4; Pt reports  Cravings 1/10, 10 being high, this past week. She reports she has not had craving for drugs. Pt participated in the spirituality group, facilitated by Eduarda Lowery with staff counselor present during group. Pt completed and shared in group therapy her assignments on \"Relapse prevention Plan\" and \"Shame and Guilt\". Pt was open to feedback from peers and staff.    Dimension 6  Risk 3;  Pt is spending free time with female peers and attending at least 3; 12-step meetings weekly while in . Pt participated in weekend workshop on relationships and completed all activities. Patient had a phone interview  with  Geno Price intake personal " from 55+ to review and initiate aftercare placement opportunities on 5/21/2020.  Pt reports she will attend out-patient mental health and go to meetings as her outpatient plan.  Family Involvement:   Pt had contact with spouse    Data:   good insight client did participate    Intervention:   Aftercare planning  Behavior modification  Counselor feedback  Education  Emotional management  Group feedback  Motivational Enhancement Therapy  Relapse prevention  Twelve Step facilitation  Mental health education      Assessment:   Stages of Change Model  Contemplation    Appears/Sounds:  Cooperative  Motivated  Engaged      Plan:  Focus on recovery environment  Monitor emotional/physical health  Continue working through treatment plan goals.    NANCI Loya

## 2020-05-26 NOTE — PROGRESS NOTES
INDIVIDUAL SESSION SUMMARY    D) Met with client on 5/26/20 from 10:00-11:00 am.     Client reported that she is ready to move on from treatment and she had a lot of plans for when she leaves the . Client reported these plans revolve around getting her house in order and doing chores that provide her a routine. Client reported that she needs to stay busy in order for her to stay sober. Client reported being very resourceful in getting what she needs for state support. Client reported that she plans on taking a trip to [place] with her son, but that she has mixed feelings about it because her brother will be there, and she feels that her brother killed her mother and not her sister. Client became shut down when asked about her feelings about the situation. Client identified strengths including: her resourcefulness.  Client reported self-care activities including: gardening, cooking and baking.    I) Individual session with client provided client with verbal interventions including: validation, nurturing, compassion and support. Discussed the importance of recovery behaviors such as utilizing sponsorship, sober support network, going to 12-step meetings, daily rituals, and goal setting. Discussed the benefits of positive self-talk, gratitude, self-compassion, resiliency, and self-trust. Highlighted client's strengths including: her resourcefulness. Therapist encouraged client to continue with individual therapy.     A) Client appears to have trouble identifying emotions and to lack skills for emotional regulation and stress management. Client appears to struggle with regulating impulses and focusing attention. Client appears to be planning for the future. Client appears to have a desire for a daily routine, meaningful activities, and a sense of purpose. Client appears to have good motivation at this time and would benefit from continuing support to help with processing past traumas, stress management, emotional  regulation, impulse control, relapse prevention, increasing resiliency and self-esteem.    P) No future sessions scheduled.   Kelly Mcleod, Student Intern  5/26/2020

## 2020-05-27 ENCOUNTER — HOSPITAL ENCOUNTER (OUTPATIENT)
Dept: BEHAVIORAL HEALTH | Facility: CLINIC | Age: 55
End: 2020-05-27
Attending: FAMILY MEDICINE
Payer: COMMERCIAL

## 2020-05-27 VITALS — OXYGEN SATURATION: 99 % | TEMPERATURE: 97.4 F

## 2020-05-27 PROCEDURE — H2035 A/D TX PROGRAM, PER HOUR: HCPCS | Mod: HQ

## 2020-05-27 PROCEDURE — 10020000 ZZH LODGING PLUS FACILITY CHARGE ADULT

## 2020-05-27 RX ORDER — SERTRALINE HYDROCHLORIDE 100 MG/1
100 TABLET, FILM COATED ORAL
COMMUNITY

## 2020-05-27 RX ORDER — QUETIAPINE FUMARATE 400 MG/1
400 TABLET, FILM COATED ORAL
COMMUNITY

## 2020-05-27 NOTE — IP AVS SNAPSHOT
MRN:[id number]                      After Visit Summary   5/27/2020    Annika Garrido    MRN: [id number]           Visit Information        Provider Department      5/27/2020  7:15 AM ADULT LODGING PLUS E [facility]        Your next 10 appointments already scheduled    May 28, 2020  7:15 AM CDT  Treatment with ADULT LODGING PLUS E  [facility] ([facility]) [facility] [address] [address]  [phone]      May 29, 2020  7:15 AM CDT  Treatment with ADULT LODGING PLUS E  [facility] ([facility]) [address] [address]  [phone]      May 30, 2020  7:15 AM CDT  Treatment with ADULT LODGING PLUS E  [facility] ([facility]) [facility] [address] [address]  [phone]      May 31, 2020  7:15 AM CDT  Treatment with ADULT LODGING PLUS E  [facility] ([facility]) [facility] [address] [address]  [phone]      Jun 01, 2020  9:00 AM CDT  Video Visit with A-1 TRACK 55+  [facility] ([facility]) [address]  [address]  [facility] [address]  [phone]   [facility]  Note: this is not an onsite visit; there is no need to come to the facility.  Please have a list of all current medications available for appointment.      Jun 02, 2020  9:00 AM CDT  Video Visit with A-1 TRACK 55+  [facility] ([facility]) [address]  [address]  [facility] [address]  [phone]   [facility]  Note: this is not an onsite visit; there is no need to come to the facility.  Please have a list of all current medications available for appointment.      Jun 04, 2020  9:00 AM CDT  Video Visit with A-1 TRACK 55+  [facility] ([facility]) [address]  [address]  [facility] [address]  [phone]   [facility]  Note: this is not an onsite visit; there is no need to come to the facility.  Please have a list of all current medications available for appointment.      Jun 08, 2020  9:00 AM CDT  Video Visit with A-1 TRACK 55+  [facility] ([facility]) [address]  [address]  [facility] [address]  [phone]   [facility]  Note: this is not an onsite visit; there is no need to come to the facility.  Please have a list of all current medications available for appointment.      Jun 09, 2020  9:00 AM CDT  Video Visit with A-1 TRACK 55+  [facility] ([facility]) [address]  [address]  [facility] [address]  [phone]   [facility]  Note: this is not an onsite visit; there is no need to come to the facility.  Please have a list of all current medications available for appointment.      Jun 11, 2020  9:00 AM CDT  Video Visit with A-1 TRACK 55+  [facility] ([facility]) [address]  [address]  [facility] [address]  [phone]   [facility]  Note: this is not an onsite visit; there is no need to come to the facility.  Please have a list of all current medications available for appointment.         MyChart Information    [employer] lets you send messages to your doctor,  "view your test results, renew your prescriptions, schedule appointments and more. To sign up, go to [email or web address] . Click on \"Log in\" on the left side of the screen, which will take you to the Welcome page. Then click on \"Sign up Now\" on the right side of the page.     You will be asked to enter the access code listed below, as well as some personal information. Please follow the directions to create your username and password.     Your access code is: [email or web address]  Expires: 2020  1:02 PM     Your access code will  in 60 days. If you need help or a new code, please call your   [facility] clinic or [phone].       Care EveryWhere ID    This is your Care EveryWhere ID. This could be used by other organizations to access your [facility] medical records  [id number]       Equal Access to Services    CASS HERNANDEZ : Hadii julio c amezcuao Sojake, waaxda luqadaha, qaybta kaalmada adelivan, sana gay. So [facility] [phone].    ATENCIÓN: Si habla español, tiene a spann disposición servicios gratuitos de asistencia lingüística. Rajan al [phone].    We comply with applicable federal and state civil rights laws, including the [place] Human Rights Act. We do not discriminate on the basis of race, color, creed, Church, national origin, marital status, age, disability, sex, sexual orientation, or gender identity.       "

## 2020-05-27 NOTE — PROGRESS NOTES
Nursing Discharge Planning Meeting    Writer completed discharge planning meeting with patient. Discharge is planned for 5/29/20.    Discussed appropriate follow up care to manage MH, medical, TONIA and to obtain medication refills. Patient given a copy of their current medications for reference. Questions were answered at this time and the patient verbalized an understanding of the post-discharge follow up plan.    Patient to schedule an appointment with her PCP:     Yesenia Fragoso MD    [address] [facility] [address] [place] [address] [id number] [phone] (Fax)      Follow up with : Psychiatrist: [facility] - Meena Delgadillo  ([phone])      Continue to support patient in discharge planning as needed to assure appropriate continuity of care.     Tobacco Cessation  Patient participated in the nicotine replacement therapy for tobacco cessation or reduction during their treatment programming: Yes.  Pt using NRT gum and has script for nicotine patch    The patient was provided with community resources for follow-up to continue tobacco cessation support once in the community. Also the patient was encoruaged to discuss their tobacco cessation efforts with the primary care provider.

## 2020-05-27 NOTE — IP AVS SNAPSHOT
Medication List       Patient:  ANOOP TREVIZO   :  1965   Physician:  Kunal, [facility]           This is your record.  Keep this with you and show to your community pharmacist(s) and physician(s) at each visit.     Allergies:  No Known Allergies          Medications  Valid as of: May 27, 2020 -  5:04 PM    Generic Name Brand Name Tablet Size Instructions for use    Albuterol Sulfate PROAIR HFA/PROVENTIL HFA/VENTOLIN  (90 Base) MCG/ACT Inhale 1-2 puffs into the lungs every 4 hours as needed for shortness of breath / dyspnea or wheezing        Dolutegravir Sodium TIVICAY 50 MG Take 50 mg by mouth daily        Emtricitabine-Tenofovir AF DESCOVY 200-25 MG Take 1 tablet by mouth daily        Ibuprofen ADVIL/MOTRIN 200 MG Take 600 mg by mouth 3 times daily as needed for mild pain         Ketoconazole NIZORAL 2 % Apply topically daily as needed for itching or irritation        Mometasone Furoate ELOCON 0.1 % Apply topically daily        Nicotine NICODERM CQ 21 MG/24HR Place 1 patch onto the skin every 24 hours        Nicotine Polacrilex COMMIT 2 MG Place 1 lozenge (2 mg) inside cheek every hour as needed for smoking cessation        Nicotine Polacrilex COMMIT 2 MG Place 1 lozenge (2 mg) inside cheek every hour as needed for smoking cessation        Nicotine Polacrilex NICORETTE 2 MG Place 1-2 each (2-4 mg) inside cheek as needed for smoking cessation        Polyethylene Glycol 3350 MIRALAX 17 g Take 17 g by mouth daily as needed for constipation        QUEtiapine Fumarate SEROquel 100 MG Take 100 mg by mouth Take 3 tabs by mouth every night at bedtime.  May increase to 4 tabs (400mg) at bedtime after a week if indicated.  Prescribed by Meena Delgadillo        Sertraline HCl ZOLOFT 100 MG Take 100 mg by mouth Take 2 tabs by mouth once daily.  Prescribed by Meena Delgadillo        .           .           .           .

## 2020-05-28 ENCOUNTER — TELEPHONE (OUTPATIENT)
Dept: BEHAVIORAL HEALTH | Facility: CLINIC | Age: 55
End: 2020-05-28

## 2020-05-28 ENCOUNTER — HOSPITAL ENCOUNTER (OUTPATIENT)
Dept: BEHAVIORAL HEALTH | Facility: CLINIC | Age: 55
End: 2020-05-28
Attending: FAMILY MEDICINE
Payer: COMMERCIAL

## 2020-05-28 VITALS — TEMPERATURE: 97.1 F | OXYGEN SATURATION: 99 %

## 2020-05-28 PROCEDURE — 10020000 ZZH LODGING PLUS FACILITY CHARGE ADULT

## 2020-05-28 PROCEDURE — H2035 A/D TX PROGRAM, PER HOUR: HCPCS | Mod: HQ

## 2020-05-28 NOTE — PROGRESS NOTES
[facility] Discharge Summary/Instructions     Patient: Annika Garrido   MRN: [id number]  : 1965  Age: 54 year old Sex: female    Focus of Treatment / Discharge Recommendations    Personal Safety/ Management of Symptoms  * Follow your safety plan.  Report increased symptoms to your care team and /or go to the nearest Emergency Department.  * Call crisis lines as needed   [facility] [phone]                 [facility] [phone]  [facility] [phone]                Crisis Connection [phone]  [facility] [phone]                [facility] [phone]  [facility] [phone]         National Suicide Prevention [phone]  [facility] [phone]             Suicide Prevention [phone]  [facility] [phone]                  CALL 911     Abstinence/Relapse Prevention  * Take all medicines as directed.  Carry a current list of medicines with you.   * Use coping skills: nutrition, rest, exercise, spirituality, journal, meditation, engage in activities you enjoy, seek sober support, volunteer at the food panty, take dog for a walk, spend time with grandson.  * Do not use illicit (street) drugs, controlled substances (narcotics) or alcohol.    Develop/Improve Independent Living/Socialization Skills: Ensure living environment conducive to sobriety.    Community Resources/Supports and Discharge Planning:  Maintain abstinence from all mood altering substances, attend 2+ sober support meetings per week, maintain contact with a program sponsor, [facility] - and follow recommendations, continue with 1.1 therapy, maintain good physical and mental health care    [facility] Intergroup Alcoholics Anonymous:  [phone] ( 24 hours a day)    [place]  Alcoholics Anonymous : [phone]    Narcotics Anonymous : [address] [place] [address] ( [phone])    [facility]  Connection: Free recovery resources.  [phone]    Access chats, online meetings, discussions and healthy check-in activities any day, any time, from anywhere. Participate as anonymously as you like. In order to access this resource ,  Go to: [employer]   Click on the In recovery tab. Then click on Social Community. Click on The daily Pledge: people helping people 24/7 to join.     Follow up with psychiatrist / main caregiver: Dr Johnson   Next visit: 5/29/20 @ 3 pm    Follow up with your therapist: WILBERTO   Next visit: n/a    Go to group therapy and / or support groups at: [facility] and attend 2+ sober support meetings in your community.     See your medical doctor about: any and all medical concerns    Other: n/a    Client Signature:_______________________   Date / Time:___________    Staff Signature:________________________   Date / Time:___________

## 2020-05-28 NOTE — TELEPHONE ENCOUNTER
Writer received a call from the [facility] staff regarding transition for Pt to the 55+ program on Monday.  Explained the technology Annika will need for participation in the program and that we will call her tomorrow to complete some admission work, as well as test her video connection.  She will be using a computer at home.  Explained it would be helpful for her to have PANOSOL as her default web browser but otherwise she doesn't have to download anything.  She is discharging tomorrow morning at 8:30am.  Plan for us to manny marino in the afternoon.

## 2020-05-28 NOTE — PROGRESS NOTES
[address] [address]                  Annika Garrido, 1965, was admitted for evaluation/treatment of chemical dependency at [facility].  This person took part in these program(s):    ______ The Inpatient Program   ______ The Outpatient Program   __X___ The Lodging Plus Program   ______ Lodging Day Outpatient       Date admitted: 5/4/2020  Date discharged: 5/29/2020      Type of discharge:   ___X___ Satisfactory - completed evaluation / treatment   ______ Discharged without completing   ______ Behavioral discharge   ______ Transferred to another chemical dependency program   ______ Transferred to another type of service   ______ Left against medical advice (AMA) / Eloped       Comments: Annika completed the Lodging Plus Program. She is scheduled to attend 55+ MICD program through [facility] beginning 6/1/2020.       Counselor: NANCI Elliott                       Date: 5/28/2020             Time: 11:46 AM

## 2020-05-29 ENCOUNTER — TELEPHONE (OUTPATIENT)
Dept: BEHAVIORAL HEALTH | Facility: CLINIC | Age: 55
End: 2020-05-29

## 2020-05-29 NOTE — PROGRESS NOTES
CHEMICAL DEPENDENCY DISCHARGE SUMMARY   NAME:Kin Garrido  : 1965    MR #: [id number]    EVALUATION COUNSELOR: This patient had a Rule  Combined Assessment Form on 3/5/2020 at  [facility] completed by Mackenzie.  TREATMENT COUNSELOR: Elayne Castrejon [facility]; Mabel Victor [facility]; Susan Jacome [facility],  and  Shani Montemayor [facility].        REFERRAL SOURCE:  Self          PROGRAM:  Adult Chemical Dependency Lodging Plus    ADMISSION DATE: 2020    LAST SESSION DATE: 2020  DISCHARGE DATE: 2020    ADMISSION DIAGNOSIS:   F10.20 Alcohol Use Disorder, Severe  F14.20 Stimulant Use Disorder, Severe   F12.20 Cannabis Use Disorder, Severe    DISCHARGE DIAGNOSIS:   F10.20 Alcohol Use Disorder, Severe  F14.20 Stimulant Use Disorder, Severe   F12.20 Cannabis Use Disorder, Severe    DISCHARGE STATUS: Patient was discharged with staff approval.    LAST USE DATE:  2020    DAYS OF TREATMENT COMPLETED: 25      PRESENTING INFORMATION: Annika Garrido is a 54 year old female, who reported, she is seeking treatment on her own.   She reported she has been waiting for this opportunity since October.    SERVICES PROVIDED:  Services included assessment, orientation, treatment planning, individual counseling, group therapy sessions, spiritual care counseling, aftercare planning, acupuncture, nutrition in recovery lecture, workshops focusing on relapse prevention and relationships, education on chemical dependency, mental illness, and AIDS/HIV awareness.     ISSUES ADDRESSED IN TREATMENT:      DIMENSION 1/ACUTE WITHDRAWAL ISSUES/DETOX:    Admit RR:0        DC RR: 0    Patient reports last use date  2020. Patient denied symptoms of post-acute withdrawal while in treatment. She attended an educational lecture on post-acute withdrawal and verbalized understanding of the timeline of symptoms. Patients UAs while in treatment were negative. Patient was stable at the time of discharge.           DIMENSION 2/BIOMEDICAL:  Admit RR:   "2         DC RR: 1     Patient is HIV+, she was medication compliant while in treatment. She denied any biomedical concerns that would interfere with treatment. Patient plans to follow up with her primary provider as needed. Due to her staying medication complaint while in treatment, her risk rating was lowered to \"1\".    DIMENSION 3/EMOTIONAL/BEHAVIORAL:  Admit RR: 2     DC RR: 2      Patient reports a mental health diagnosis of Bi-polar disorder with psychosis. She was compliant with following medication regime while in treatment. Patient participated in a suicide risk screening on admission and was assessed as \"Low Risk\". Patient completed a required safety plan the first week of treatment with primary counselor. Patient denied suicidal or homicidal ideation while in treatment. Patient worked on her mental health issues by completing an assignment, \"Understanding Bi-polar and addiction\", and presenting in group therapy. Patient also read educated materials on \"Trauma and abuse\" \"Grief and loss\" and completed assignment on this topics. Patient also met with staff therapist weekly to process mental health concerns. She worked on herself to develop a more positive self-image by completing her  Book of Me  assignment and presenting in group. Patient demonstrated the ability to turn her negative self-talk into positive affirmations and began to practice daily meditation while in treatment. Due to patient's difficulty with emotional regulation and low distress tolerance, she remains at risk rating  2.      DIMENSION 4/READINESS TO CHANGE:  Admit RR: 2        DC RR: 1    At time of admission, patient verbalized her primary motivation for treatment was for herself. Patient completed her  Consequences  assignment and identified how her alcohol use has contributed to violating her personal value system. Patient's internal motivation was increased by creating a collage of what her life will look like in five years sober vs. " "still using. Patient appears to be in the preparation stage of change at this time. Patient's risk rating in this area lowered to \"1\",  although, she struggled with group attendance, she was motivated, and ready to change.    DIMENSION 5/RELAPSE & CONTINUED PROBLEM POTENTIAL:  Admit RR:    3  DC RR: 3       Patient worked on assignments to identify her relapse patterns and read material on emotional regulation. Patient participated in two weekend workshops on relapse prevention and completed a relapse prevention plan. Patient demonstrated the ability to verbalize high risk situations for relapse and identified detailed coping skills for triggering situations. She monitored any urges throughout treatment and discussed her Urge Tracker worksheet with counselors. Patient worked on \"Self- forgiveness\" and completed a worksheet which she presented in group therapy. Patient learned about boundaries and co-dependency and challenged her tendency to isolate by engaging with peers while in treatment. Patient continues to struggle with setting boundaries with unsupportive people in her life and would benefit from individual therapy. Patient's risk rating in this dimension remain a  3  based on her aftercare  living arrangement.    DIMENSION 6/RECOVERY ENVIRONMENT: Admit RR:   4     DC RR: 3   Patient identified ways to improve her sober environment by identifying 25 sober activities attending weekly two 12-step support group meetings.  Patient worked to build supportive relationships while in treatment with female peers. Patient attended two relationships workshops and received education on family roles in addiction, healthy versus unhealthy relationships, and codependency. Patient's risk rating lowered to a  3  based on her aftercare plans and safe housing. She is scheduled to attend 55+ MICD program through [facility] beginning 6/1/2020.     STRENGTHS: Patient struggled with group attendance, but put forth consistent " effort in reaching treatment plan goals, and following staff recommendations, She gained considerable insight into relapse prevention strategies and resources which can be utilized in recovery.     PROGNOSIS:   1. Patient has a favorable discharge assuming that she follows all the aftercare recommendations and continues to build sober support network.       LIVING ARRANGEMENTS AT DISCHARGE: Home with granddaughter and Spouse. She is scheduled to attend 55+ MICD program through [facility] beginning 6/1/2020.      CONTINUING CARE RECOMMENDATIONS/REFERRALS:   1. Remain abstinent from all mood altering chemicals.  2. Enter aftercare at 55+ MICD program.  3. Comply with expectations of extended care.   4. Monitor and comply with the advice of your doctor regarding mental and physical health, remain medication compliant.  5. Attend a minimum of two 12-step meetings weekly and obtain a female sponsor.  6. Continue investment in building sober support network in recovery.  7. Seek individual therapy with referrals given.  8. Continue to practice coping skills for emotional health and substance use relapse prevention.  9. Continue to pursue employment or volunteer opportunities.     NANCI Olvera.

## 2020-06-01 ENCOUNTER — TELEPHONE (OUTPATIENT)
Dept: BEHAVIORAL HEALTH | Facility: CLINIC | Age: 55
End: 2020-06-01

## 2020-06-01 NOTE — TELEPHONE ENCOUNTER
Called Annika to follow up on starting in the 55+ Program today.  She reported she has an appointment at 11:30 today so will start tomorrow instead.  Denied any technical difficulties getting started in the group.  Will inform team of her delayed start.  Will follow up as needed.   Mariza May, OTR/L

## 2020-06-01 NOTE — TELEPHONE ENCOUNTER
Received voicemail pt had not received link for group. Called pt to verify email address and reported would send link to pt during the break between groups. Pt confirmed she would check for link

## 2020-06-05 ENCOUNTER — TELEPHONE (OUTPATIENT)
Dept: BEHAVIORAL HEALTH | Facility: CLINIC | Age: 55
End: 2020-06-05

## 2020-06-09 ENCOUNTER — TELEPHONE (OUTPATIENT)
Dept: BEHAVIORAL HEALTH | Facility: CLINIC | Age: 55
End: 2020-06-09

## 2020-06-09 PROBLEM — F31.5 BIPOLAR I DISORDER, CURRENT OR MOST RECENT EPISODE DEPRESSED, WITH PSYCHOTIC FEATURES (H): Status: ACTIVE | Noted: 2020-06-09

## 2020-06-09 NOTE — TELEPHONE ENCOUNTER
Writer called to check in regarding seeming to have technical issues getting into group. She won't be able to join today. She's been using her granddaughter's ipad but said something about getting a new phone today/tomorrow and should be able to join thursday.    Brittney Moeller RN on 6/9/2020 at 11:30 AM

## 2020-06-11 ENCOUNTER — TELEPHONE (OUTPATIENT)
Dept: BEHAVIORAL HEALTH | Facility: CLINIC | Age: 55
End: 2020-06-11

## 2020-06-12 ENCOUNTER — APPOINTMENT (OUTPATIENT)
Dept: ADDICTION MEDICINE | Facility: CLINIC | Age: 55
End: 2020-06-12
Payer: COMMERCIAL

## 2020-06-17 ENCOUNTER — TELEPHONE (OUTPATIENT)
Dept: BEHAVIORAL HEALTH | Facility: CLINIC | Age: 55
End: 2020-06-17

## 2020-06-18 NOTE — TELEPHONE ENCOUNTER
Writer contacted Pt today discuss her ability to participate in the program.  She stated the ipad wasn't working and she needed to talk to her  for mental health to help her get access.  She requested this writer call her MHR  to hep her be able to access the technology necessary.  There was a lot of background noise (people) while on the phone so it was difficult to have a further discussion.     Writer did call Pt's  as there is a DRE on file per the Communication Record.  Writer left a message about the situation and stated Pt has not been able to begin in the program.

## 2020-06-22 ENCOUNTER — TELEPHONE (OUTPATIENT)
Dept: ADDICTION MEDICINE | Facility: CLINIC | Age: 55
End: 2020-06-22

## 2020-06-22 NOTE — TELEPHONE ENCOUNTER
6/20 Spoke with Annika today and was able to set up a infield visit for this Wednesday, 6/24 at 10 am we will network about finding a job, and she what her interest are

## 2020-06-24 ENCOUNTER — OFFICE VISIT (OUTPATIENT)
Dept: ADDICTION MEDICINE | Facility: CLINIC | Age: 55
End: 2020-06-24
Payer: COMMERCIAL

## 2020-06-24 ENCOUNTER — TELEPHONE (OUTPATIENT)
Dept: BEHAVIORAL HEALTH | Facility: CLINIC | Age: 55
End: 2020-06-24

## 2020-06-24 DIAGNOSIS — F19.20 CHEMICAL DEPENDENCY (H): Primary | ICD-10-CM

## 2020-06-24 NOTE — TELEPHONE ENCOUNTER
Writer attempted to connect to Pt again today.  Her phone was answered by someone else who said he had the phone at the moment but I could call her back at 12:15 today. Will attempt to talk to her at that time.

## 2020-06-24 NOTE — PROGRESS NOTES
6/24 Met up with Annika today she was in a very good place it was her Birthday today and she felt really good about being sober today she had plans with her partner for dinner which she was excited about she also made an appt to come into the clinic and get connected will follow up with her when she comes in

## 2020-07-01 ENCOUNTER — TELEPHONE (OUTPATIENT)
Dept: BEHAVIORAL HEALTH | Facility: CLINIC | Age: 55
End: 2020-07-01

## 2020-07-01 NOTE — TELEPHONE ENCOUNTER
received a message that Pt's , Toney at [phone], had called asking if Pt was enrolled in the program and if there were any other options to participate besides a computer, tablet or smart phone.  Annika has a government phone - does not allow apps.  They were going to possibly purchase a tablet so he was questioning the bjorn required on the tablet.   received feedback that the best option for a tablet would be one that is connected to her phone so text messages are shared.  It does not have to have data or make calls (no phone plan required).     did call Toney back and explained the requirements.       also called Pt today and she answered.  She stated she was interested in the program but would not be interested in doing the program audio only.  (Pt's decision re: audio only option was also shared with her ).  She said she would wait for her .     Pt will remain on hold for now.

## 2021-05-20 NOTE — PROGRESS NOTES
Is This A New Presentation, Or A Follow-Up?: [facility]    Hospitalist Progress Note    Date of Service (when I saw the patient): 06/11/2018    Assessment & Plan   Annika Garrido is a 52 year old female who was admitted on 6/10/2018 with cauda equina syndrome.    1.  Cauda equina syndrome due to L5-S1 disc herniation, s/p decompression on 6/8.  Post op cares per Neurosurgery.  Pain control with acetaminophen, oxycodone, and dilaudid PCA.    2.  HIV positive status.  Patient reports that her HIV is well controlled.  She is followed by the positive care clinic through [facility].  Will continue her current HIV medications, Tivicay and Truvada, and consult infectious disease for any recommendations regarding care.       3.  Bipolar disorder.  Continue Klonopin, Seroquel and Zoloft.     4.  Asthma.  Continue albuterol inhaler as needed.  Postoperatively patient will be started on albuterol 4 times daily to prevent exacerbation.     5.  Nicotine abuse.  Will write for nicotine lozenge as needed.  Smoking cessation was encouraged.    # Pain Assessment:  Current Pain Score 6/11/2018   Patient currently in pain? yes   Pain score (0-10) 10   Pain location Back   - Annika is experiencing pain due to surgery. Pain management was discussed and the plan was created in a collaborative fashion.  Annika's response to the current recommendations: compliant  - Please see the plan for pain management as documented above          DVT Prophylaxis: Pneumatic Compression Devices  Code Status: Full Code    Disposition: Expected discharge pending post op course.    Jasper Persaud  Text Page (7 am to 6 pm)    Interval History   The patient is resting comfortably in bed.  Her pain is well controlled.    -Data reviewed today: I reviewed all new labs and imaging results over the last 24 hours. I personally reviewed no images or EKG's today.    Physical Exam   Temp: 98.2  F (36.8  C) Temp src: Oral BP: (!) 159/103   Heart Rate: 70 Resp: 20 SpO2: 100  % O2 Device: Nasal cannula Oxygen Delivery: 2 LPM  Vitals:    06/10/18 0740   Weight: 82.6 kg (182 lb)     Vital Signs with Ranges  Temp:  [97.1  F (36.2  C)-98.3  F (36.8  C)] 98.2  F (36.8  C)  Heart Rate:  [58-89] 70  Resp:  [12-30] 20  BP: ()/() 159/103  SpO2:  [95 %-100 %] 100 %  I/O last 3 completed shifts:  In: 2475 [P.O.:295; I.V.:2180]  Out: 2115 [Urine:1475; Drains:140; Blood:500]    Gen: Well nourished, well developed, somnolent but awakens, no acute distressed  HEENT: Atraumatic, normocephalic  Lungs: Clear to ausculation without wheezes, rhonchi, or rales  Heart: Regular rate and rhythm, no murmurs, gallops, or rubs  GI: Bowel sound normal, no hepatosplenomegaly or masses  Lymph: No lymphadenopathy or edema  Skin: No rashes     Medications     0.9% sodium chloride + KCl 20 mEq/L 50 mL/hr at 06/11/18 0625     HYDROmorphone       sodium chloride 100 mL/hr at 06/10/18 1314       acetaminophen  975 mg Oral Q8H     clonazePAM (klonoPIN) tablet 1 mg  1 mg Oral At Bedtime     clonazePAM (klonoPIN) tablet 2 mg  2 mg Oral QAM     dolutegravir  50 mg Oral Daily     emtricitabine-tenofovir  1 tablet Oral Daily     QUEtiapine (SEROquel) tablet 200 mg  200 mg Oral At Bedtime     senna-docusate  1 tablet Oral BID    Or     senna-docusate  2 tablet Oral BID     sertraline (ZOLOFT) tablet 100 mg  100 mg Oral Daily     sodium chloride (PF)  3 mL Intracatheter Q8H       Data     Recent Labs  Lab 06/11/18  0757 06/10/18  1050   WBC 7.7 4.0   HGB 11.2* 13.8   MCV 83 82    192    143   POTASSIUM 3.8 3.5   CHLORIDE 110* 112*   CO2 26 26   BUN 11 12   CR 0.63 0.76   ANIONGAP 5 5   MANOLO 8.5 9.0   GLC 99 90   ALBUMIN  --  3.7   PROTTOTAL  --  8.3   BILITOTAL  --  0.3   ALKPHOS  --  100   ALT  --  21   AST  --  18       Recent Results (from the past 24 hour(s))   Lumbar spine MRI w/o contrast    Narrative    MRI LUMBAR SPINE WITHOUT CONTRAST 6/10/2018 9:54 AM     HISTORY: Acute urinary retention, back  How Severe Are Your Warts?: moderate pain, band of numbness and  saddle anesthesia.     TECHNIQUE: Multiplanar multisequence MRI of the lumbar spine without  contrast.    COMPARISON: None.     FINDINGS: There are five lumbar-type vertebral bodies assumed for the  purposes of this dictation. The conus terminates at the level of L1.  The distal spinal cord and cauda equina appear normal.     Alignment of the lumbar spine is normal. No loss of vertebral body  height. There are no destructive osseous lesions. Marked loss of  intervertebral disc space with disc desiccation at L5-S1. Loss of disc  height and disc desiccation also seen at L3-L4 and L4-L5. There is  edema at the opposing facets of L3 and L4 bilaterally with small  bilateral facet effusions.     Level by level as follows:     T12-L1: No significant spinal canal or neural foraminal narrowing.  Mild anterior osteophyte spurring.    L1-L2: No significant spinal canal or neural foraminal narrowing.     L2-L3: No significant spinal canal or neural foraminal narrowing.     L3-L4: Posterior disc bulge and bilateral facet hypertrophy right  greater than left results in moderate right and mild left neural  foraminal narrowing. No spinal canal narrowing. Small bilateral facet  joint effusions and mild adjacent edema in the facets as described  above.     L4-L5: Posterior disc bulge asymmetric in the left subarticular region  along with bilateral facet hypertrophy. Findings result in mild  central spinal canal narrowing and mild bilateral neural foraminal  narrowing.     L5-S1: Large central disc extrusion. There is abnormal signal  extending inferiorly from the disc likely representing extruded disc  material. This fills the spinal canal at the L5-S1 level extending  caudally throughout the S1 level. Findings completely efface the  thecal sac likely affecting the nerve roots below the level of S2. The  left S2 nerve root is likely also impinged by the disc extrusion and  left greater than right facet  hypertrophy. There is mild left neural  foraminal narrowing due to facet hypertrophy and disc extrusion. No  significant right neural foraminal narrowing.    Paraspinous soft tissues: Normal.       Impression    IMPRESSION:   1. Large central disc extrusion at the L5-S1 level with disc material  filling the spinal canal and likely compressing the descending nerve  roots below the S2 level as well as likely the traversing left S1  nerve root. There is also mild left neural foraminal narrowing at this  level.  2. Additional degenerative changes at L3-L4 and L4-L5 as described  above.  3. Small bilateral facet effusions with associated surrounding edema  at the L3-L4 level.    Results discussed with Lefty Triana at 10:06 AM on 6/10/2018.    ANN VAUGHAN MD   XR Surgery LEONARD Fluoro L/T 5 Min w Stills    Narrative    SURGERY C-ARM FLUOROSCOPY LESS THAN 5 MINUTES WITH STILLS   6/10/2018  8:20 PM     COMPARISON: MRI lumbar spine from 6/10/2018.    HISTORY: L5-S1 laminectomy.     NUMBER OF IMAGES ACQUIRED: 3 images and axial scans through the lower  lumbar spine.    VIEWS:  2.    FLUOROSCOPY TIME: .4 minutes.      Impression    IMPRESSION: Images demonstrate anterior and posterior fusion at the  L5-S1 level without evidence of complication.     ORAL SOUTH MD

## 2021-06-23 NOTE — PLAN OF CARE
POSTERIOR VITREOUS DETACHMENT, OU: PATIENT EDUCATION GIVEN. NO HOLES. NO TEARS. RETURN FOR FOLLOW-UP AS SCHEDULED FOR FURTHER OCT TESTING AND EVALUATION. Problem: Patient Care Overview  Goal: Plan of Care/Patient Progress Review  Occupational Therapy Discharge Summary    Reason for therapy discharge:    Discharged to transitional care facility.    Progress towards therapy goal(s). See goals on Care Plan in [facility] electronic health record for goal details.  Goals not met.  Barriers to achieving goals:   limited tolerance for therapy and discharge from facility.    Therapy recommendation(s):    Continued therapy is recommended.  Rationale/Recommendations:  To maximize I in ADL.

## 2021-10-05 NOTE — IP AVS SNAPSHOT
[facility] [address] [facility]    [address]    [place] [address]    Phone:  [phone]                                       After Visit Summary   6/10/2018    Annika Garrido    MRN: [id number]           After Visit Summary Signature Page     I have received my discharge instructions, and my questions have been answered. I have discussed any challenges I see with this plan with the nurse or doctor.    ..........................................................................................................................................  Patient/Patient Representative Signature      ..........................................................................................................................................  Patient Representative Print Name and Relationship to Patient    ..................................................               ................................................  Date                                            Time    ..........................................................................................................................................  Reviewed by Signature/Title    ...................................................              ..............................................  Date                                                            Time           no

## 2022-12-02 ENCOUNTER — TELEPHONE (OUTPATIENT)
Dept: BEHAVIORAL HEALTH | Facility: CLINIC | Age: 57
End: 2022-12-02

## 2022-12-02 NOTE — TELEPHONE ENCOUNTER
Pt is a(n) adult (18+ out of HS) Seeking as eval for Adult TONIA Assessment for evaluation and recommendations. and is interested in Adult TONIA/Co-Occurring Program (Either In-Person or Virtual).  Appointment scheduled by:  Patient.  (If patient is self-pay, we much complete a Cost Estimate and save it to the pt chart)  Caller name:  Annika    Caller phone #: [phone]  If in person, please state reason why:  NA  Brief reason for appt:  Pt request    Cost estimate Notget completed.  Contact information verified/updated: yes

## 2022-12-05 ENCOUNTER — HOSPITAL ENCOUNTER (OUTPATIENT)
Dept: BEHAVIORAL HEALTH | Facility: CLINIC | Age: 57
Discharge: HOME | End: 2022-12-05
Attending: FAMILY MEDICINE | Admitting: FAMILY MEDICINE
Payer: MEDICAID

## 2022-12-05 ENCOUNTER — TELEPHONE (OUTPATIENT)
Dept: BEHAVIORAL HEALTH | Facility: CLINIC | Age: 57
End: 2022-12-05

## 2022-12-05 VITALS — HEIGHT: 66 IN | WEIGHT: 180 LBS | BODY MASS INDEX: 28.93 KG/M2

## 2022-12-05 DIAGNOSIS — F14.20 COCAINE USE DISORDER, SEVERE, DEPENDENCE (H): ICD-10-CM

## 2022-12-05 PROCEDURE — H0001 ALCOHOL AND/OR DRUG ASSESS: HCPCS | Mod: TEL

## 2022-12-05 ASSESSMENT — PATIENT HEALTH QUESTIONNAIRE - PHQ9: SUM OF ALL RESPONSES TO PHQ QUESTIONS 1-9: 16

## 2022-12-05 ASSESSMENT — COLUMBIA-SUICIDE SEVERITY RATING SCALE - C-SSRS
5. HAVE YOU STARTED TO WORK OUT OR WORKED OUT THE DETAILS OF HOW TO KILL YOURSELF? DO YOU INTEND TO CARRY OUT THIS PLAN?: NO
4. HAVE YOU HAD THESE THOUGHTS AND HAD SOME INTENTION OF ACTING ON THEM?: NO
3. HAVE YOU BEEN THINKING ABOUT HOW YOU MIGHT KILL YOURSELF?: NO
1. IN THE PAST MONTH, HAVE YOU WISHED YOU WERE DEAD OR WISHED YOU COULD GO TO SLEEP AND NOT WAKE UP?: NO
2. HAVE YOU ACTUALLY HAD ANY THOUGHTS OF KILLING YOURSELF IN THE PAST MONTH?: NO
6. HAVE YOU EVER DONE ANYTHING, STARTED TO DO ANYTHING, OR PREPARED TO DO ANYTHING TO END YOUR LIFE?: YES

## 2022-12-05 ASSESSMENT — ANXIETY QUESTIONNAIRES
GAD7 TOTAL SCORE: 10
5. BEING SO RESTLESS THAT IT IS HARD TO SIT STILL: NOT AT ALL
3. WORRYING TOO MUCH ABOUT DIFFERENT THINGS: NEARLY EVERY DAY
6. BECOMING EASILY ANNOYED OR IRRITABLE: NEARLY EVERY DAY
2. NOT BEING ABLE TO STOP OR CONTROL WORRYING: NEARLY EVERY DAY
7. FEELING AFRAID AS IF SOMETHING AWFUL MIGHT HAPPEN: NOT AT ALL
4. TROUBLE RELAXING: SEVERAL DAYS
GAD7 TOTAL SCORE: 10
1. FEELING NERVOUS, ANXIOUS, OR ON EDGE: NOT AT ALL

## 2022-12-05 ASSESSMENT — PAIN SCALES - GENERAL: PAINLEVEL: MILD PAIN (2)

## 2022-12-05 NOTE — PROGRESS NOTES
Annika Garrido Release of Information requests were e-mailed to the [place] [id number] OBC's at [email or web address] on 12/5/2022 with requests for the following releases:    Patient's home address:   [address] [place] [address]  [address]  [place] [address]    1.) TONIA - 914207 - Collateral Contact & Emergency Contact   Dia Umanzor, relative, [phone]    2.) TONIA - [id number] - Exchange of MH & TONIA Records  [facility]  [address] [place] [address]  Phone: [phone]

## 2022-12-05 NOTE — TELEPHONE ENCOUNTER
LP SCREEN TELEPHONE NOTE   Annika Garrido paperwork was reviewed by NANCI Caldwell and the patient was deemed ELIGIBLE for the LP program.     Inpatient or Community Referral: Community referral     Medical Screening (As Needed): The patient's medical and COVID-19 screen with the LP RN is pending at this time and NEEDS to be completed prior to placing this patient on the LP waiting list.     Regular use of benzodiazapine's (other than detox): The patient does NOT use benzodiazepines.     Insurance: The [facility] Department is responsible for obtaining ALL authorizations for treatment services at the EOP, DOP and LP levels of care.      This will be a: Seen by a [place] Evaluation Counselor: SARIAH, ISP & LP UPDATE NOTE evaluation. (Update SBAR and route DAANES and DRE's)     IV use or Pregnant: This patient is not pregnant or an IV drug user.     Business office: [phone]     List: This patient CAN NOT be placed on the COMMUNITY LP Waiting List until after being medically approved for the LP program by the LP RN.       Group: Women's Group     Additional Info as needed: Patient was in L+ May/June 2020.  Patient had a medical screen at that time and recently relocated back to [place] 2 months ago.  Patient has not re-established medical care for HIV + status but said attended appts in [place].  A DRE was mailed to patient's address for [facility] to obtain records.     The best current contact telephone number for the patient is: [phone]       Amber Villa LADC   12/5/2022

## 2022-12-05 NOTE — PROGRESS NOTES
"    [facility]      PATIENT'S NAME: Annika Garrido  PREFERRED NAME: Annika  PRONOUNS: she/her/hers     MRN: [id number]  : 1965  ADDRESS: [address]  [place] [address]  ACCT. NUMBER:  [id number]  DATE OF SERVICE: 22  START TIME: 8:00 am  END TIME: 8:39 am  INSURANCE:  Medicaid [place]  PMI:  [id number]  PREFERRED PHONE: [phone]  May we leave a program related message: Yes  EMERGENCY CONTACT:  Dia Umanzor, relative, [phone]    SERVICE MODALITY:  Phone Visit:      Provider verified identity through the following two step process.  Patient provided:  Patient  and Patient's last 4 digits of SSN    The patient has been notified of the following:      \"We have found that certain health care needs can be provided without the need for a face to face visit.  This service lets us provide the care you need with a phone conversation.       I will have full access to your [facility] medical record during this entire phone call.   I will be taking notes for your medical record.      Since this is like an office visit, we will bill your insurance company for this service.       There are potential benefits and risks of telephone visits (e.g. limits to patient confidentiality) that differ from in-person visits.?Confidentiality still applies for telephone services, and nobody will record the visit.  It is important to be in a quiet, private space that is free of distractions (including cell phone or other devices) during the visit.??      If during the course of the call I believe a telephone visit is not appropriate, you will not be charged for this service\"     Consent has been obtained for this service by care team member: Yes     UNIVERSAL ADULT Substance Use Disorder DIAGNOSTIC ASSESSMENT    Identifying Information:  Patient is a 57 year old,  individual.  Patient was referred for an assessment by self.  " "Patient attended the session alone.    Chief Complaint:   The reason for seeking services at this time is: \"Smoking crack.\"   The problem(s) began 2 months ago. Patient has attempted to resolve these concerns in the past through IP at [facility] in 2020.  Patient does not appear to be in severe withdrawal, an imminent safety risk to self or others, or requiring immediate medical attention and may proceed with the assessment interview.    Social/Family History:  Patient reported they grew up in [place].  They were raised by parents who were always together.  Patient has 6 brothers and 3 sisters.  Patient was the 9th child.  Patient reported that their childhood was \"good.  Great.  I had a good childhood.\"  Patient describes current relationships with family of origin as \"majority of them is dead, so....\"      The patient describes their cultural background as Black American Buddhism.  Cultural influences and impact on patient's life structure, values, norms, and healthcare: None.  Contextual influences on patient's health include: Contextual Factors: Individual Factors Substance Use.  Patient identified their preferred language to be English. Patient reported they do not need the assistance of an  or other support involved in therapy.  Patient reports they are not involved in community of vadim activities.  They reports spirituality impacts recovery in the following ways:  \"I was raised Buddhism.\"     Patient reported had no significant delays in developmental tasks.   Patient's highest education level was GED. Patient identified the following learning problems: none reported.  Patient reports they are  able to understand written materials.    Patient reported the following relationship history as never , but had a long term relationships.  Patient's current relationship status is partnered / significant other for 20 years.  Patient said her significant other drinks alcohol.   Patient identified " "their sexual orientation as heterosexual.  Patient reported having zero child(angel).     Patient's current living/housing situation involves staying in own home/apartment.  They live with her significant other and they report that housing is stable. Patient identified siblings as part of their support system.  Patient identified the quality of these relationships as stable and meaningful.  Patient's sister has been locked up for 25 years and just got out in May.    Patient reports engaging in the following recreational/leisure activities: None. Patient reports engaging in the following recreation/leisure activities while using: \"Isolating.\" Patient reports the following people are supportive of recovery: Everybody.  Patient is currently disabled.  Patient reports their income is obtained through disability.  Patient does identify finances as a current stressor.  Cultural and socioeconomic factors do affect the patient's access to services.      Patient denies substance related arrests or legal issues.  Patient denies being on probation / parole / under the jurisdiction of the court.    Patient's Strengths and Limitations:  Patient identified the following strengths or resources that will help them succeed in treatment: sober support group / recovery support . Things that may interfere with the patient's success in treatment include: financial hardship and mental health.     Assessments:  The following assessments were completed by patient for this visit:  PHQ9:   PHQ-9 SCORE 5/4/2020 5/21/2020 12/5/2022   PHQ-9 Total Score 3 9 16     GAD7:   NIHARIKA-7 SCORE 5/4/2020 5/21/2020 12/5/2022   Total Score 0 20 10     CAGE-AID:   CAGE-AID Total Score 12/5/2022   Total Score 3     PROMIS 10-Global Health (only subscores and total score):   PROMIS-10 Scores Only 7/25/2018 9/10/2018 12/12/2018 12/5/2022   Global Mental Health Score 7 6 7 6   Global Physical Health Score 9 9 9 12   PROMIS TOTAL - SUBSCORES 16 15 16 18     [place] " Suicide Severity Rating Scale (Lifetime/Recent)  [place] Suicide Severity Rating (Lifetime/Recent) 10/22/2019 12/5/2022   Q1 Wished to be Dead (Past Month) yes no   Q2 Suicidal Thoughts (Past Month) yes no   Q3 Suicidal Thought Method yes no   Q4 Suicidal Intent without Specific Plan yes no   Q5 Suicide Intent with Specific Plan yes no   Q6 Suicide Behavior (Lifetime) yes yes   Within the Past 3 Months? - no   Level of Risk per Screen - moderate risk     GAIN-sliding scale:  When was the last time that you had significant problems... 12/5/2022   with feeling very trapped, lonely, sad, blue, depressed or hopeless about the future? Past month   with sleep trouble, such as bad dreams, sleeping restlessly, or falling asleep during the day? Past Month   with feeling very anxious, nervous, tense, scared, panicked or like something bad was going to happen? Past month   with becoming very distressed & upset when something reminded you of the past? Past month   with thinking about ending your life or committing suicide? 1+ years ago      When was the last time that you did the following things 2 or more times? 12/5/2022   Lied or conned to get things you wanted or to avoid having to do something? Past month   Had a hard time paying attention at school, work or home? Past month   Had a hard time listening to instructions at school, work or home? Never   Were a bully or threatened other people? Never   Started physical fights with other people? Never       Personal and Family Medical History:  Patient did report a family history of mental health concerns.  Patient reports family history includes Schizophrenia in her brother.      Patient reported the following previous mental health diagnoses: Depression, bi-polar psychosis.  Patient reports their primary mental health symptoms include:  none and these do not impact her ability to function.   Patient has received mental health services in the past: , [facility].   "Psychiatric Hospitalizations: \"a long time ago, I don't know right now.\"  Patient denies a history of civil commitment.  Current mental health services/providers include:  Meena Delgadillo at [facility], next appt in February 2023.    Patient has had a physical exam to rule out medical causes for current symptoms in [place], [place].  Date of last physical exam was within the past year. Symptoms have developed since last physical exam and client was encouraged to follow up with PCP.   The patient does not have a Primary Care Provider and was encouraged to establish care with a PCP.  Patient reports no current medical and/or dental concerns.  Patient denies any issues with pain. Patient denies pregnancy..  There are significant appetite / nutritional concerns / weight changes.  Patient reported losing 10 lbs.  Patient does not report a history of an eating disorder.  Patient does not report a history of head injury / trauma / cognitive impairment.      Patient reports current meds as:   Outpatient Medications Marked as Taking for the 12/5/22 encounter (Hospital Encounter) with Amber Fall LADC   Medication Sig     albuterol (PROAIR HFA/PROVENTIL HFA/VENTOLIN HFA) 108 (90 Base) MCG/ACT Inhaler Inhale 1-2 puffs into the lungs every 4 hours as needed for shortness of breath / dyspnea or wheezing     dolutegravir (TIVICAY) 50 MG tablet Take 50 mg by mouth daily     emtricitabine-tenofovir AF (DESCOVY) 200-25 MG per tablet Take 1 tablet by mouth daily     ibuprofen (ADVIL/MOTRIN) 200 MG tablet Take 600 mg by mouth 3 times daily as needed for mild pain      ketoconazole (NIZORAL) 2 % shampoo Apply topically daily as needed for itching or irritation     mometasone (ELOCON) 0.1 % external ointment Apply topically daily     QUEtiapine (SEROQUEL) 100 MG tablet Take 100 mg by mouth Take 3 tabs by mouth every night at bedtime.  May increase to 4 tabs (400mg) at bedtime after a week if indicated.  Prescribed by Meena Delgadillo     " "sertraline (ZOLOFT) 100 MG tablet Take 100 mg by mouth Take 2 tabs by mouth once daily.  Prescribed by Meena Delgadillo       Medication Adherence:  Patient reports taking prescribed medications as prescribed.  Patient is able to self-administer medications.    Patient Allergies:  No Known Allergies    Medical History:    Past Medical History:   Diagnosis Date     Arthritis 2017    hips     Asymptomatic human immunodeficiency virus (HIV) infection status (H)      Depressive disorder      Uncomplicated asthma     with cold       Substance Use:  Patient reported no family history of chemical health issues.  Patient has received substance use disorder and/or gambling treatment in the past.  Patient reports the following dates and locations of treatment services:  [facility] May 2020-June 2020.  Patient has not ever been to detox.  Patient is not currently receiving any chemical dependency treatment. Patient reports no history of support group attendance.        Substance Age of first use Pattern and duration of use (include amounts and frequency) Date of last use     Withdrawal potential Route of administration   has used Alcohol 20s \"I'm not a drinker.\" 4 years No oral   has not used Marijuana        has used Amphetamines   55 Patient said she tried meth when she went to [place].  Patient said she was using on the weekends. 3 months No smoked   has used Cocaine/crack    30s Patient said she was only using on the weekends, spending $20 per time and it would last her an hour.  Patient said she would use for 1 day only on the weekends.  Patient said her use has been going on since she moved back to [place] 2 months ago. 1 week No smoked   has not used Hallucinogens        has not used Inhalants        has used Heroin 20s Patient said she has IV use over 30 years ago. 30 years ago No IV - injected   has not used Other Opiates        has not used Benzodiazepine        has not used Barbiturates        has not used Over the " "counter meds.        has not use Caffeine        has used Nicotine  Teens Patient said she smokes cigarettes every other day, maybe 2 per week. 12/4/22 No smoked   has not used other substances not listed above:  Identify:           Patient reported the following problems as a result of their substance use: family problems and financial problems.  Patient is concerned about substance use. Patient reports nobody is concerned about their substance use.  Patient reports their recovery goals are \"I want to change my mind frame.  That's the reason I want to come in.\"     Patient reports experiencing the following withdrawal symptoms within the past 12 months: none and the following within the past 30 days: none.   Patients reports urges to use Crack.  Patient reports she has used more Crack than intended and over a longer period of time than intended. Patient reports she has had unsuccessful attempts to cut down or control use of Crack.  Patient reports longest period of abstinence was 2 years and return to use was due to \"I got depressed.  My son in law got killed.\" Patient reports she has not needed to use more Crack to achieve the same effect.  Patient does not report diminished effect with use of same amount of Crack.     Patient does not report a great deal of time is spent in activities necessary to obtain, use, or recover from Crack effects.  Patient does  report important social, occupational, or recreational activities are given up or reduced because of Crack use.  Crack use is continued despite knowledge of having a persistent or recurrent physical or psychological problem that is likely to have caused or exacerbated by use.  Patient reports the following problem behaviors while under the influence of substances: None.     Patient reports substance use has not ever impacted their ability to function in a school setting. Patient reports substance use has not ever impacted their ability to function in a work " setting.  Patients demographics and history impact their recovery in the following ways:  None.  Patient reports engaging in the following recreation/leisure activities while using:  Using alone at home.  Patient reports the following people are supportive of recovery: Everybody.    Patient does not have a history of gambling concerns and/or treatment.  Patient does not have other addictive behaviors she is concerned about.      Dimension Scale Ratings:    Dimension 1 -  Acute Intoxication/Withdrawal: 1 - Minor Problem  Impatient with process.  The patient displays mild to moderate intoxication or signs and symptoms interfering with daily functioning but does not immediately endanger self or others. The patient poses minimal risk of severe withdrawal.  Dimension 2 - Biomedical: 1 - Minor Problem   Patient is HIV positive. The patient tolerates and patrizia with physical discomfort and is able to get the services that the patient needs.  Dimension 3 - Emotional/Behavioral/Cognitive Conditions: 2 - Moderate Problem  Presented with pressured speech. The patient has difficulty with impulse control and lacks coping skills.  The patient has moderate symptoms of emotional, behavioral, or cognitive problems. The patient is able to participate in most treatment activities.  Dimension 4 - Readiness to Change:  1 - Minor Problem  Patient was ambivalent about problems with use. The patient is motivated, with active reinforcement, to explore treatment and strategies for change but is ambivalent about illness or need for change.   Dimension 5 - Relapse/Continued Use/ Continued Problem Potential: 4 - Extreme Problem   Denial of any problems with use. The patient has no coping skills to arrest mental health or addiction illnesses or prevent relapse. The patient has no recognition or understanding of relapse and recidivism issues and displays high vulnerability for further substance use disorders or mental health problems.  Dimension 6  "- Recovery Environment:  3 - Severe Problem  No sober supports. The patient is not engaged in structured, meaningful activity and the patient s peers, family, significant other and living environment are unsupportive, or there is significant criminal justice involvement.    Significant Losses / Trauma / Abuse / Neglect Issues:   Patient did not serve in the .  There are indications or report of significant loss, trauma, abuse or neglect issues related to: are no indications and client denies any losses, trauma, abuse, or neglect concerns.  Concerns for possible neglect are not present.     Safety Assessment:   Patient denies current homicidal ideation and behaviors.  Patient denies current self-injurious ideation and behaviors.    Patient denied risk behaviors associated with substance use.  Patient reported substance use associated with mental health symptoms.  Patient reports the following current concerns for their personal safety: None.  Patient reports there are not firearms in the house.           History of Safety Concerns:  Patient denied a history of homicidal ideation.     Patient denied a history of personal safety concerns.    Patient denied a history of assaultive behaviors.    Patient denied a history of sexual assault behaviors.     Patient denied a history of risk behaviors associated with substance use.  Patient reported a history of substance use associated with mental health symptoms.  Patient reports the following protective factors: \"me.\"    Risk Plan:  See Recommendations for Safety and Risk Management Plan    Review of Symptoms per patient report:   Substance Use:  family relationship problems due to substance use and cravings/urges to use     Collateral Contact Summary:   Collateral contacts contributing to this assessment:  Patient's EHR was accessed at the time of this assessment.    If court related records were reviewed, summarize here: [employer] was accessed at the time of this " assessment and patient only has traffic citations from 2021 and 2022.    Information from collateral contacts supported/largely agreed with information from the client and associated risk ratings.    Information in this assessment was obtained from the medical record and provided by patient who is a hesitant historian.    Patient will have open access to their mental health medical record.    Diagnostic Criteria:  1.) Alcohol/drug is often taken in larger amounts or over a longer period than was intended.  Met for Cocaine.  2.) There is a persistent desire or unsuccessful efforts to cut down or control alcohol/drug use.  Met for Cocaine.  4.) Craving, or a strong desire or urge to use alcohol/drug.  Met for Cocaine.  6.) Continued alcohol use despite having persistent or recurrent social or interpersonal problems caused or exacerbated by the effects of alcohol/drug.  Met for Cocaine.  7.) Important social, occupational, or recreational activities are given up or reduced because of alcohol/drug use.  Met for Cocaine.  9.) Alcohol/drug use is continued despite knowledge of having a persistent or recurrent physical or psychological problem that is likely to have been caused or exacerbated by alcohol.  Met for Cocaine.    As evidenced by self report and criteria, client meets the following DSM5 Diagnoses:   (Sustained by DSM5 Criteria Listed Above)   Stimulant Use Disorder:  In a controlled environment, Specify current severity:  Severe  304.20 (F14.20) Severe, Cocaine.    Recommendations:     1. Plan for Safety and Risk Management:  Recommended that patient call 911 or go to the local ED should there be a change in any of these risk factors.      Report to child / adult protection services was NA.     2. TONIA Recommendations:     1)  Complete a residential based or similar treatment program.    2)  Abstain from all mood-altering chemicals unless prescribed by a licensed provider.    3)  Follow all the recommendations of  your treatment/medical providers.   4)  Continue to attend your scheduled individual psychotherapy appointments.     5)  Establish care with a Primary Care Physician in [place] for continuing care coordination.    Patient reports they are willing to follow these recommendations.  Patient would like the following family or other support people involved in their treatment:  None. Patient has a history of opiate use and was give treatment options, including Medication Assisted Treatment, and information on the risks of opiod use disorder including recognizing and responding to opiod overdose.    3. Mental Health Referrals:  Patient stated she re-established care with Meena Franco at [facility], but her next appt is not until February 2023.     4. Clinical Substantiation/medical necessity for the above recommendations:  Met with patient individually on 12/5/22 for a comprehensive assessment including summary of assessment and conclusion, assessment risk and appropriate steps taken, documentation to support the diagnosis, rationale for disposition and appropriate level of care recommended and alternative for treatment options.  Patient presented with pressured speech and impatience at the process.  Patient was in [facility] in May-June 2020.  Patient said she moved to [place] after programming and just moved back to [place] 2 months ago.  Patient said she did crystal meth on the weekends when living in [place] and is now back to using crack cocaine on the weekends in [place].  Patient wants to stop her use and [facility].  Patient said she has some of her items from storage being delivered to her here on 12/19/22 and would like to start programming afterward, as she does not have anyone able to be present for the delivery.  Patient is HIV + and has not re-established care with a PCP in [place] since moving back, but said she will be able to do this now that she has insurance here in  [place].     Rational for recommended level of care: The patient has been unable to maintain abstinence from crack cocaine while living at her current home environment, lacks long-term sober maintenance skills, lacks a sober peer support network, has dual issues of mental health and substance abuse and has medical issues which are exacerbated by substance abuse.    Placement/Program Barriers Identified: None identified.    Referral:   [facility]  Mental Health & Addiction Services  Intake Phone: [phone]  Substance Use ([employer])     5. Patient's identified no cultural concerns in relation to treatment.     6. Recommendations for treatment focus:   Alcohol / Substance Use - See above.     7.  Interactive Complexity: No     DAANES Assessment ID: [id number]    Provider Name/ Credentials:  Amber Fall MA, [facility]  December 5, 2022

## 2022-12-05 NOTE — TELEPHONE ENCOUNTER
Writer contacted pt to inform her she is on the [facility] wait list pending insurance verification. Pt stated she is still taking all of the same medications as she was in 2020 the last time she was in [facility]. No RN screen needed at this time.

## 2022-12-13 NOTE — TELEPHONE ENCOUNTER
Eleaonrr called pt to offer a lodging plus bed. Pt stated she just established a new provider in [place] and her  Appointment is December 19th. She needs to get her medications refilled. Pt is scheduled for a direct admission to [facility] on Tuesday 12/20/22 at 10am.      called PT back to go over the program information:    You will be given a test for COVID19 and will need to quarantine in your room until negative results are received, if your results are positive you will be discharged immediately     You must remain sober from ALL substances for 24 hours prior to admission otherwise you will not be allowed to admit into [facility]     A urine drug screen and breathalyzer will be required when you arrive on campus, please do not use the restroom until testing has been completed     You can bring a cell phone for virtual visitation or virtual DrHorace Visits. In person visitation will be on Sunday from 2:30pm-3:00pm and 3:10pm-3:40pm. Pt will be allowed to have one person approved for visitation. No children allowed. Virtual Visitation is still Tuesday/Sunday from 5:45pm-6:45pm.    You can bring one suitcase of belongings. You will get a roommate after 10 days so we want to minimize the amount of things in the room.    Medications- Try to bring a 30day supply. Keep them in a separate bag to hand to the nurse.     No off-campus appointments allowed.    SMOKING-You are allowed to bring cigarettes, but we will keep them locked up. No Vapes allowed. Pt can go out 4 times a day to smoke.    You must be on time for your scheduled appointment, if you are running late you must call the  at [phone]

## 2022-12-13 NOTE — TELEPHONE ENCOUNTER
----- Message from Mary Carmen Fermin sent at 12/13/2022 10:26 AM CST -----  Regarding: NEW SHAKIRA NEEDED  This pt is a direct admit to [facility] on Tuesday 12/20/22. Thanks

## 2022-12-20 ENCOUNTER — HOSPITAL ENCOUNTER (OUTPATIENT)
Dept: BEHAVIORAL HEALTH | Facility: CLINIC | Age: 57
Discharge: HOME | End: 2022-12-20
Attending: FAMILY MEDICINE
Payer: MEDICAID

## 2022-12-20 VITALS
BODY MASS INDEX: 33.43 KG/M2 | HEIGHT: 66 IN | TEMPERATURE: 98 F | HEART RATE: 72 BPM | RESPIRATION RATE: 16 BRPM | DIASTOLIC BLOOD PRESSURE: 97 MMHG | SYSTOLIC BLOOD PRESSURE: 157 MMHG | WEIGHT: 208 LBS | OXYGEN SATURATION: 99 %

## 2022-12-20 DIAGNOSIS — F17.200 NICOTINE DEPENDENCE: Primary | ICD-10-CM

## 2022-12-20 LAB — SARS-COV-2 RNA RESP QL NAA+PROBE: NEGATIVE

## 2022-12-20 PROCEDURE — 1002N00001 HC LODGING PLUS FACILITY CHARGE ADULT

## 2022-12-20 PROCEDURE — U0003 INFECTIOUS AGENT DETECTION BY NUCLEIC ACID (DNA OR RNA); SEVERE ACUTE RESPIRATORY SYNDROME CORONAVIRUS 2 (SARS-COV-2) (CORONAVIRUS DISEASE [COVID-19]), AMPLIFIED PROBE TECHNIQUE, MAKING USE OF HIGH THROUGHPUT TECHNOLOGIES AS DESCRIBED BY CMS-2020-01-R: HCPCS | Performed by: FAMILY MEDICINE

## 2022-12-20 PROCEDURE — H2035 A/D TX PROGRAM, PER HOUR: HCPCS | Mod: HQ

## 2022-12-20 RX ORDER — CLOTRIMAZOLE 1 %
CREAM (GRAM) TOPICAL DAILY
COMMUNITY

## 2022-12-20 RX ORDER — LANOLIN ALCOHOL/MO/W.PET/CERES
3 CREAM (GRAM) TOPICAL
COMMUNITY
End: 2023-01-16

## 2022-12-20 RX ORDER — MAGNESIUM HYDROXIDE/ALUMINUM HYDROXICE/SIMETHICONE 120; 1200; 1200 MG/30ML; MG/30ML; MG/30ML
30 SUSPENSION ORAL EVERY 6 HOURS PRN
COMMUNITY
End: 2023-01-16

## 2022-12-20 RX ORDER — ACETAMINOPHEN 325 MG/1
325-650 TABLET ORAL EVERY 4 HOURS PRN
COMMUNITY
End: 2023-01-16

## 2022-12-20 RX ORDER — LORATADINE 10 MG/1
10 TABLET ORAL DAILY PRN
COMMUNITY
End: 2023-01-16

## 2022-12-20 RX ORDER — AMOXICILLIN 250 MG
2 CAPSULE ORAL DAILY PRN
COMMUNITY
End: 2023-01-16

## 2022-12-20 RX ORDER — IBUPROFEN 200 MG
400 TABLET ORAL EVERY 6 HOURS PRN
COMMUNITY
End: 2023-01-16

## 2022-12-20 ASSESSMENT — COLUMBIA-SUICIDE SEVERITY RATING SCALE - C-SSRS
2. HAVE YOU ACTUALLY HAD ANY THOUGHTS OF KILLING YOURSELF IN THE PAST MONTH?: NO
1. IN THE PAST MONTH, HAVE YOU WISHED YOU WERE DEAD OR WISHED YOU COULD GO TO SLEEP AND NOT WAKE UP?: NO
4. HAVE YOU HAD THESE THOUGHTS AND HAD SOME INTENTION OF ACTING ON THEM?: NO
6. HAVE YOU EVER DONE ANYTHING, STARTED TO DO ANYTHING, OR PREPARED TO DO ANYTHING TO END YOUR LIFE?: NO
2. HAVE YOU ACTUALLY HAD ANY THOUGHTS OF KILLING YOURSELF LIFETIME?: NO
3. HAVE YOU BEEN THINKING ABOUT HOW YOU MIGHT KILL YOURSELF?: NO
5. HAVE YOU STARTED TO WORK OUT OR WORKED OUT THE DETAILS OF HOW TO KILL YOURSELF? DO YOU INTEND TO CARRY OUT THIS PLAN?: NO
1. IN THE PAST MONTH, HAVE YOU WISHED YOU WERE DEAD OR WISHED YOU COULD GO TO SLEEP AND NOT WAKE UP?: YES
1. IN YOUR LIFETIME, HAVE YOU WISHED YOU WERE DEAD OR WISHED YOU COULD GO TO SLEEP AND NOT WAKE UP?: PASSIVE THOUGHTS

## 2022-12-20 ASSESSMENT — PATIENT HEALTH QUESTIONNAIRE - PHQ9: SUM OF ALL RESPONSES TO PHQ QUESTIONS 1-9: 9

## 2022-12-20 ASSESSMENT — ANXIETY QUESTIONNAIRES
GAD7 TOTAL SCORE: 2
5. BEING SO RESTLESS THAT IT IS HARD TO SIT STILL: SEVERAL DAYS
3. WORRYING TOO MUCH ABOUT DIFFERENT THINGS: SEVERAL DAYS
7. FEELING AFRAID AS IF SOMETHING AWFUL MIGHT HAPPEN: NOT AT ALL
4. TROUBLE RELAXING: NOT AT ALL
1. FEELING NERVOUS, ANXIOUS, OR ON EDGE: NOT AT ALL
GAD7 TOTAL SCORE: 2
6. BECOMING EASILY ANNOYED OR IRRITABLE: NOT AT ALL
2. NOT BEING ABLE TO STOP OR CONTROL WORRYING: NOT AT ALL

## 2022-12-20 NOTE — GROUP NOTE
Group Therapy Documentation    PATIENT'S NAME: Annika Garrido  MRN:   [id number]  :   1965  [id number]  DATE OF SERVICE: 22  START TIME:  9:00 AM  END TIME: 11:00 AM  FACILITATOR(S): Saida Lange  TOPIC: BEH Group Therapy  Number of patients attending the group:  10    Group Length:  2 Hours    Group Therapy Type: Recovery strategies, Emotion processing, Daily living/independence skills, and Health and wellbeing     Summary of Group / Topics Discussed:    Recovery Principles, Sober coping skills, and Relapse prevention      Group Attendance:  Excused from group session    Patient's response to the group topic/interactions:  Patient was not yet admitted.    Patient appeared to be Patient was not yet admited      Client specific details:  Patient was not yet admitted.

## 2022-12-20 NOTE — PROGRESS NOTES
Initial Services Plan    Before your first treatment group, please do the following    Immediate health & safety concerns: Look for sober housing and a supportive social network.  Look for a support network (such as AA, NA, DBT group, a Temple group, etc.)    Suggestions for client during the time between intake & completion of treatment plan:  Tour your treatment center (unit or outpatient clinic).  Introduce yourself to the treatment group.  Spend time getting to know your peers.  Complete the problem list for your treatment plan.  Start drug and alcohol use history.  Review your patient or client handbook.    Client issues to be addressed in the first treatment sessions:  Identify motivations(s) for coming to treatment, i.e. legal, family, job, self  Identify concerns about coming into treatment, i.e. fear of failing again, sharing a room in treatment  Identify concerns about going to group, i.e. fear of talking in group  Identify outside concerns that may interfere with treatment, i.e. bills not getting paid, homesick for children    Kitty Mcdonnell [facility]  12/20/2022

## 2022-12-20 NOTE — PROGRESS NOTES
Comprehensive Summary Update and Review  Counselor met with patient on 12/20/2022 and reviewed the Comprehensive Assessment. Safety plan complete sent for scanning to pt's EHR. There were no changes/updates identified by patient or in chart entries.

## 2022-12-20 NOTE — PROGRESS NOTES
"[facility] Health Assessment      Vital signs:     BP (!) 157/97 (BP Location: Left arm, Patient Position: Sitting)   Pulse 72   Temp 98  F (36.7  C) (Oral)   Resp 16   Ht 1.676 m (5' 6\")   Wt 94.3 kg (208 lb)   LMP  (LMP Unknown)   SpO2 99%   BMI 33.57 kg/m        Direct admission    Counselor: Isaias  Drug of Choice: Cocaine   Last use: 12/18/22  Home clinic/MD:   [facility]  Yesenia Fragoso MD    [address]    [place] [address]    Phone: [phone]    Fax: [phone]  No DRE completed at time of admission per pt preference      Psychiatrist/therapist:   Dr. Delgadillo, CNP  [facility]  [address] [place] [address] [address]  [place] [address]  Phone: [phone]  Fax: [phone]  No DRE completed at time of admission per pt preference      Medical history/current conditions: HIV. Denies any other medical hx. Pt reports she develops wheezing with respiratory illness and has an albuterol inhaler PRN to treat this; denies any other respiratory illness or diagnosis. Pt also noted to have elevated BP at time of admission, however she denies having HTN.     H&P Screen:  H&P within the last 90 days: Yes.  Date: 12/19/22 Location: PCP Dr. Fragoso [facility]     Mental Health diagnosis: Bipolar psychosis  Medication compliant?: yes  Recent sucidal thoughts? None    When? N/A  Current thought of self-harm? None    Plan? N/A    Pain assessment:   Pt. Experiencing pain at this time?  No    [facility] Screen for COVID-19    ______________________________________________________________________________________________________________________  Do you have any of the following NEW or worsening symptoms NOT attributed to pre-existing conditions?    No    o Fever of 100.0  F (37.8 C) or over  o Chills  o Cough  o Shortness of Breath  o Loss of taste or smell  o Generalized body " aches  o Persistent headache  o Sore throat (or trouble eating or drinking in young children?)  o Nausea, Vomiting, or diarrhea (loose stools)    Did you test positive for COVID-19 in the last 10 days or are you waiting on the test results due to an exposure or symptoms?  No    Has anyone told you to self-quarantine due to exposure to someone with COVID-19?  No    If pt responds   YES to any of the symptoms or  Positive COVID-19 result in the last 10 days with or without symptoms or YES to symptoms with pending results ptwill need to leave unit immediately and can return in 11 days from discharge date.    ______________________________________________________________________________________________________________________  If you are admitting directly from the community you will be required to stay in your room until COVID lab results confirm negative. If COVID results are positive, You will have to exit the LP program, quarantine as recommended per CDC and then may return for CD treatment after symptoms have resolved.  What is your exit plan should you be positive for COVID today or anytime during your stay? Return home and pt will call for a ride.    COVID-19 Test completed by LPRN ? Yes    COVID-19 - Pt informed of the following while at :    1) Practice good hand washing hygiene and avoid touching face    2) If pt has any of the symptoms below, notify staff immediately.      Fever     Cough     Shortness of breath or difficulty breathing     Chills     Repeated shaking with chills    Muscle pain     3) COVID-19 testing may be initiated more than once during your stay.  If COVID results are at anytime positive, the pt will follow exit plan as listed above,  quarantine as recommended per CDC and then may return for CD treatment after symptoms have resolved.     4) Per COVID protocol, during your stay at , social distancing is required AND mouth and nose must be covered at all times with facial mask while out  in milieu.      5) Patients will not be allowed to go to any outside appointments, all outside appointments will need to be virtual or by phone    RN Assessment of Patient's Ability to Safely Manage and Self-Administer Respiratory Treatments    Has experience in the management of Respiratory (If NA, indicate and move to Integrative Therapies): Yes    Including knowledge and understanding of the importance of:    Does pt have any of the following Respiratory Illness/disorders?   Asthma, COPD, RAD, Bronchitis, Emphysema, Cystic fibrosis, TAMAR   Which Acute or Chronic Respiratory Illness do you have which requires intervention? Pt reports that she is prescribed an albuterol inhaler for wheezing induced by respiratory virus.    Did pt bring all prescribed respiratory supplies? CPAP, BiPap, Nebulizer, Nebulizer solution, scheduled inhaler, Rescue Inhaler  Yes   Pt understands they must carry  Rescue Inhalers  at all times Yes   Alerting staff with respiratory symptoms?  Wheezing, SOB, Tightness or pain in chest, Excessive Daytime Sleepiness Yes     Does the patient have the physical and mental ability to:     Perform respiratory cares? CPAP, BiPap, Nebulizer tx, scheduled inhaler, Rescue Inhaler tx, respiratory medicines Yes   Determine when and how often to use respiratory treatments? (CPAP, BiPap, Nebulizer tx, scheduled inhaler, Rescue Inhaler tx, respiratory medicines Yes   Nebulizer/CPAP/BiPAP accessories No, none prescribed   CPAP/BiPAP Therapy settings No N/A     Therefore does the patient, present a risk of harm to themselves or other clients in the facility if allowed to self-administer Respiratory treatments.  Consider factors above.  No    I have assessed the patient to be able to safely administer respiratory treatments.  Yes    Integrative Therapies: Essential Oils    Patient requesting essential oil inhaler to manage (Mood/Mental Health/Physical/Spiritual symptoms).     Discussed appropriate use of essential  oil inhalers and instructed patient not to leave labeled product out on unit.     Patient was screened for kidney disease, asthma/reactive airway disease and rashes and wounds or 1st trimester of pregnancy    List Essential Oils requested by pt: lavender    Patient verbalized and demonstrated understanding of how to use essential oil inhaler correctly and will notify LP RN with any concerns or side effects. Patient agrees not to share their essential oil inhaler with other clients.  Continue to support the patient in safely utilizing integrative therapies as able to manage symptoms during treatment.     Patient tobacco use:    Do you use tobacco? yes   Type? Cigarette   How often? daily  How much? 0.25 ppd   Are you interested in quitting? yes    NRT (Nicotine Replacement therapy) ordered? Nicotine gum   Pt is aware of the dangers of tobacco cessation and in contemplation.    Pt given written education.    Nutritional Assessment:    Have you ever purged, binged or restricted yourself as a way to control your weight?   No     Are you on a special diet?   No     Do you have any concerns regarding your nutritional status?   No     Have you had any appetite changes in the last 3 months?   No   Have you had weight loss or weight gain of more than 10 lbs in the last 3 months?   If patient gained or lost more than 10 lbs, then refer to program RN / attending Physician for assessment.   No   Was the patient informed of BMI?    Refused, No Intervention   No, refused   Have you engaged in any risk-taking behavior that would put you at risk for exposure to blood-borne or sexually transmitted diseases?   No   Do you have any dental problems?   No       LPRN reviewed with pt the below 'medical concerns/medication refill expectations' while at LP Yes    LP has no rounding provider to assess medical issues or to refill your medications    Please make virtual/phone appt/s with your community provider/s and notify LPRN of date and  time    You may not leave LP to attend any medical appt's.     You are responsible for having a plan to refill medications if necessary.  Please allow time to complete this.    Pt verbalizes understanding of the above criteria.      Nursing Assessment Summary: Pt appears medically stable. See above for admission details.    On-going nursing intervention required?   No    Acute care visit recommended: no.    Dorcas Ni RN

## 2022-12-20 NOTE — PROGRESS NOTES
Progress Note    This patient had a Comprehensive Substance Abuse assessment on 12/05/2022 completed by NANCI Caldwell.  This patient was seen for a face to face update of the Comprehensive Substance Abuse assessment on 12/20/2022 by NANCI Mcmullen.  INSIDE: The patient's Comprehensive Substance Abuse assessment completed on 12/05/2022 is in the patient's electronic medical record in Epic in the Chart Review section under the Notes/Trans Tab.    Alcohol/Drug use since the last CD evaluation (include date of last use):     Pt reported her last use of crack-cocaine was on 12/18/2022. She denies current withdrawal symptoms.      Please note any other clinical changes since the last CD evaluation (such as medication changes, additional legal charges, detoxification admissions, overdoses, etc.)     No significant changes since the last CD evaluation       ASAM Dimensions Original scores Current Scores   I.) Intoxication and Withdrawal: 1 0   II.) Biomedical:  1 1   III.) Emotional and Behavioral:  2 2   IV.) Readiness to Change:  1 1   V.) Relapse Potential: 4 4   VI.) Recovery Environmental: 3 3     Please list clinical justifications for the above ASAM score changes since the original comprehensive assessment:     The patient's score on Dimension I changed from a 1 to a 0.  The patient had not consumed any alcohol or drugs since 12/18/2022 and she does not appear to have any current risk of having significant withdrawal symptoms.        Current MANOJ: Current UA:     0.000     Positive for Cocaine and negative for all other screened drugs.       PHQ-9, NIHARIKA-7   PHQ-9 on 12/20/2022 NIHARIKA-7 on 12/20/2022   The patient's PHQ-9 score was 9 out of 27, indicating mild depression.   The patient's NIHARIKA-7 score was 2 out of 21, indicating minimal anxiety.       [place]-Suicide Severity Rating Scale Reassessment   Have you ever wished you were dead or that you could go to sleep and not wake up?  Past Month:  No     Have  you actually had any thoughts of killing yourself?  Past Month:  No     Have you been thinking about how you might do this?     Past Month:  No   Lifetime:  No   Have you had these thoughts and had some intention of acting on them?     Past Month:  No   Lifetime:  No   Have you started to work out the details of how to kill yourself?   Past Month:  No   Lifetime:  No   Do you intend to carry out this plan?   No     When you have the thoughts how long do they last?  The patient denied having any suicidal thoughts within the past month.     Are there things - anyone or anything (i.e. family, Anglican, pain of death) that stopped you from wanting to die or acting on thoughts of suicide?  Does not apply       2008  [employer]  Used with permission by Lata Abraham, PhD.       Guide to C-SSRS Risk Ratings   NO IDEATION:  with no active thoughts IDEATION: with a wish to die. IDEATION: with active thoughts. Risk Ratings   If Yes No No 0 - Very Low Risk   If NA Yes No 1 - Low Risk   If NA Yes Yes 2 - Low/moderate risk   IDEATION: associated thoughts of methods without intent or plan INTENT: Intent to follow through on suicide PLAN: Plan to follow through on suicide Risk Ratings cont...   If Yes No No 3 - Moderate Risk   If Yes Yes No 4 - High Risk   If Yes Yes Yes 5 - High Risk   The patient's ADDITIONAL RISK FACTORS and lack of PROTECTIVE FACTORS may increase their overall suicide risk ratings.     Additional Risk Factors:    Significant history of physical illness or chronic medical problems     A recent death of someone close to the patient and/or unresolved grief and loss issues   Protective Factors:    Having people in his/her life that would prevent the patient from considering a suicide attempt (i.e. young children, spouse, parents, etc.)     An absence of mental health issues or stable and well treated mental health issues     A positive relationship with his/her clinical medical  "and/or mental health providers     Having easy access to supportive family members     Having a good community support network     Having restricted access to highly lethal means of suicide     Risk Status   0. - Very Low Risk:  Evaluation Counselors:  Document in Epic / SBAR to counselor \"Very Low Risk\".      Treatment Counselors:  Reassess upon admission as applicable, assess weekly in progress notes under Dimension 3 and summarize in Discharge / Treatment summary under Dimension 3.     Additional information to support suicide risk rating: There was no additional information to provide at this time.       "

## 2022-12-20 NOTE — GROUP NOTE
Psychoeducation Group Documentation    PATIENT'S NAME: Annika Garrido  MRN:   [id number]  :   1965  AC[employer]. NUMBER: [id number]  DATE OF SERVICE: 22  START TIME:  3:00 PM  END TIME:  4:00 PM  FACILITATOR(S): Zeeshan Torres LADC; Susan Umanzor LADC; Saida Lange  TOPIC: BEH Pyschoeducation  Number of patients attending the group:  8  Group Length:  1 Hours    Skills Group Therapy Type: Recovery skills    Summary of Group / Topics Discussed:    Relationship/social skills and Symptom management skills          Group Attendance:  Attended group session    Patient's response to the group topic/interactions:  cooperative with task    Patient appeared to be Attentive and Engaged.         Client specific details:  The patient participated in the afternoon skills group on Behavioral Activation.
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
## 2022-12-21 ENCOUNTER — HOSPITAL ENCOUNTER (OUTPATIENT)
Dept: BEHAVIORAL HEALTH | Facility: CLINIC | Age: 57
Discharge: HOME | End: 2022-12-21
Attending: FAMILY MEDICINE
Payer: MEDICAID

## 2022-12-21 PROCEDURE — 1002N00001 HC LODGING PLUS FACILITY CHARGE ADULT

## 2022-12-21 PROCEDURE — H2035 A/D TX PROGRAM, PER HOUR: HCPCS | Mod: HQ

## 2022-12-21 NOTE — PROGRESS NOTES
Name: Annika Radhika  Date: 12/20/2022  Medical Record: [id number]    Envelope Number:[id number]    List of Contents (List each item separately in new row):   Cell phone     Admission:  I am responsible for any personal items that are not sent to the safe or pharmacy.  [facility] is not responsible for loss, theft or damage of any property in my possession.  Patient Signature:  ___________________________________________       Date/Time:__________________________    Staff Signature: __________________________________       Date/Time:__________________________    2nd Staff person, if patient is unable/unwilling to sign:      __________________________________________________________       Date/Time: __________________________    Discharge:  [facility] has returned all of my personal belongings:    Patient Signature: ________________________________________     Date/Time: ____________________________________    Staff Signature: ______________________________________     Date/Time:_____________________________________

## 2022-12-21 NOTE — GROUP NOTE
Group Therapy Documentation    PATIENT'S NAME: Annika Garrido  MRN:   [id number]  [facility]   1965  [id number]  DATE OF SERVICE: 22  START TIME:  8:30 AM  END TIME:  9:30 AM  FACILITATOR(S): Saida Lange  TOPIC: BEH Group Therapy  Number of patients attending the group:  20    Group Length:  1 Hour    Group Therapy Type: Recovery strategies, Emotion processing, Daily living/independence skills, and Health and wellbeing     Summary of Group / Topics Discussed:    Recovery Principles, Relationship/socialization, Emotions/expression, and Self-care activities      Group Attendance:  Attended group session    Patient's response to the group topic/interactions:  cooperative with task    Patient appeared to be Actively participating, Attentive and Engaged.        Client specific details:  Patient attended group lecture and was attentive and participative.

## 2022-12-21 NOTE — GROUP NOTE
Group Therapy Documentation    PATIENT'S NAME: Annika Garrido  MRN:   [id number]  [facility]   1965  [id number]  DATE OF SERVICE: 22  START TIME:  8:30 AM  END TIME:  9:45 AM  FACILITATOR(S): Saida Lange  TOPIC: BEH Group Therapy  Number of patients attending the group:  20    Group Length:  2 Hours    Group Therapy Type: Recovery strategies, Emotion processing, and Daily living/independence skills    Summary of Group / Topics Discussed:    Recovery Principles, Sober coping skills, and Emotions/expression      Group Attendance:  {Group Attendance:441442}    Patient's response to the group topic/interactions:  {OPBEHCLIENTRESPONSE:242166}    Patient appeared to be {Engagement:505387}.        Client specific details:  ***.

## 2022-12-21 NOTE — PROGRESS NOTES
"During am med pass, pt came in and appeared slightly disoriented to writer. Pt was hesitant to engage with writer, and dispensed one 50mg Tivicay tablet and one 200-25 mg Descovy tablet into med cup. Pt stated the \"colors were making her sick,\" bent over med room trash can, and produced a large amount of emesis. Pt said she would return to take meds at noon. RN met with pt outside of med room and declined to visit the ED. Writer put pills back in their respective bottles and updated MAR to reflect the incident.  "

## 2022-12-21 NOTE — GROUP NOTE
"Group Therapy Documentation    PATIENT'S NAME: Annika Garrido  MRN:   [id number]  :   1965  [facility] NUMBER: [id number]  DATE OF SERVICE: 22  START TIME:  9:30 AM  END TIME: 11:30 AM  FACILITATOR(S): Francisca Foley LADC  TOPIC: BEH Group Therapy  Number of patients attending the group:  10  Group Length:  2 Hours    Group Therapy Type: Recovery strategies    Summary of Group / Topics Discussed:    Sober coping skills, Balanced lifestyle, and Relapse prevention    Patients checked in to primary group by describing current emotions, stating an affirmation and a gratitude. Several patients processed current challenges they are experiencing. Facilitator led group discussion on relapse prevention planning. Patients gained knowledge on various resources available to support their recovery goals, and learned how utilizing a \"step down\" model of treatment and sober living can greatly improve their success at long term abstinence from problematic substances. Patients discussed needs and resources that will benefit their recovery journey. Each patient had an opportunity to process and provide constructive feedback to peers who shared.      Group Attendance:  Attended group session    Patient's response to the group topic/interactions:  cooperative with task    Patient appeared to be disengaged.        Client specific details:  Patient struggled to stay awake throughout group despite prompting from the facilitator. Patient did not engage or verbally participate in group.        "

## 2022-12-22 ENCOUNTER — HOSPITAL ENCOUNTER (OUTPATIENT)
Dept: BEHAVIORAL HEALTH | Facility: CLINIC | Age: 57
Discharge: HOME | End: 2022-12-22
Attending: FAMILY MEDICINE
Payer: MEDICAID

## 2022-12-22 DIAGNOSIS — F19.20 CHEMICAL DEPENDENCY (H): ICD-10-CM

## 2022-12-22 LAB — SARS-COV-2 RNA RESP QL NAA+PROBE: NEGATIVE

## 2022-12-22 PROCEDURE — H2035 A/D TX PROGRAM, PER HOUR: HCPCS | Mod: HQ

## 2022-12-22 PROCEDURE — U0005 INFEC AGEN DETEC AMPLI PROBE: HCPCS

## 2022-12-22 PROCEDURE — 1002N00001 HC LODGING PLUS FACILITY CHARGE ADULT

## 2022-12-22 PROCEDURE — H2035 A/D TX PROGRAM, PER HOUR: HCPCS

## 2022-12-22 NOTE — GROUP NOTE
Group Therapy Documentation    PATIENT'S NAME: Annika Garrido  MRN:   [id number]  [facility]   1965  [id number]  DATE OF SERVICE: 22  START TIME: 12:30 PM  END TIME:  2:30 PM  FACILITATOR(S): Robin Reynolds LADC  TOPIC: BEH Group Therapy  Number of patients attending the group:  11  Group Length:  2 Hours    Group Therapy Type: Recovery strategies, Emotion processing, and Daily living/independence skills    Summary of Group / Topics Discussed:    Sober coping skills and Self-care activities    Group participants shared assignment, followed by feedback from peers. Group discussion also included a reading regarding stress management techniques, followed by related discussion.       Group Attendance:  Attended group session    Patient's response to the group topic/interactions:  cooperative with task    Patient appeared to be Actively participating, Attentive and Engaged.        Client specific details: Patient engaged in group discussion. .

## 2022-12-22 NOTE — GROUP NOTE
Psychoeducation Group Documentation    PATIENT'S NAME: Annika Garrido  MRN:   [id number]  :   1965  [facility] NUMBER: [id number]  DATE OF SERVICE: 22  START TIME:  3:00 PM  END TIME:  4:00 PM  FACILITATOR(S): Vivi Shetty [facility]; Robin Reynolds [facility]  TOPIC: BEH Pyschoeducation  Number of patients attending the group:  21  Group Length:  1 Hours    Skills Group Therapy Type: Daily living/independence skills and Physical Health/Nutrition    Summary of Group / Topics Discussed:    Nutrition for Brain Health & Emotional Wellness    Dr. Socorro Kessler presented lecture with information on Nutritional Health and Emotional Wellbeing. Paper Handouts were provided breaking down the acronym B-E-G-I-N in regards to guidelines that are helpful when considering nutritional health, and contribute to overall well being, and essential to healing and living a healthy daily life.   Patients were able to discuss topics, and ask questions at the end related to presented topics.         Group Attendance:  Attended group session    Patient's response to the group topic/interactions:  cooperative with task    Patient appeared to be Actively participating.         Client specific details:  Annika attended Psych-Ed Skills Group on Nutrition for Brain Health and Emotional Wellbeing. Patient was given a handout that was reviewed with helpful facts regarding nutrition and how everything presented is related to a healthy recovery. Patient remained engaged and participated in discussion. No concerns reported.

## 2022-12-22 NOTE — GROUP NOTE
Group Therapy Documentation    PATIENT'S NAME: Annika Garrido  MRN:   [id number]  :   1965  [id number]  DATE OF SERVICE: 22  START TIME:  9:00 AM  END TIME: 11:00 AM  FACILITATOR(S): Robin Reynolds LADC  TOPIC: BEH Group Therapy  Number of patients attending the group:  10  Group Length:  2 Hours    Group Therapy Type: Recovery strategies, Emotion processing, and Daily living/independence skills    Summary of Group / Topics Discussed:    Sober coping skills, Cognitive behavioral therapy skills, Emotions/expression, and Relapse prevention    Group discussion began with reflection reading regarding change, followed by a CBT reading/activity. Feedback was provided by participants throughout group session.      Group Attendance:  Attended group session    Patient's response to the group topic/interactions:  cooperative with task    Patient appeared to be Actively participating, Attentive and Engaged.        Client specific details: Patient engaged in group discussion, providing feedback to others.

## 2022-12-23 ENCOUNTER — HOSPITAL ENCOUNTER (OUTPATIENT)
Dept: BEHAVIORAL HEALTH | Facility: CLINIC | Age: 57
Discharge: HOME | End: 2022-12-23
Attending: FAMILY MEDICINE
Payer: MEDICAID

## 2022-12-23 PROCEDURE — H2035 A/D TX PROGRAM, PER HOUR: HCPCS | Mod: HQ

## 2022-12-23 PROCEDURE — 1002N00001 HC LODGING PLUS FACILITY CHARGE ADULT

## 2022-12-23 NOTE — GROUP NOTE
Group Therapy Documentation    PATIENT'S NAME: Annika Garrido  MRN:   [id number]  :   1965  [id number]  DATE OF SERVICE: 22  START TIME:  9:00 AM  END TIME: 11:00 AM  FACILITATOR(S): Dave Morales LADC  TOPIC: BEH Group Therapy  Number of patients attending the group:  11  Group Length:  2 Hours    Group Therapy Type: Emotion processing    Summary of Group / Topics Discussed:    Disease of addiction, Emotions/expression, and Self-care activities      Group Attendance:  Attended group session    Patient's response to the group topic/interactions:  cooperative with task, expressed understanding of topic and listened actively    Patient appeared to be Attentive.        Client specific details: Patient checked in to primary group by describing current mood, stating a positive affirmation of gratitude. A new patient introduced herself to facilitator that led group discussion on self-worth and identifying core beliefs. Patient experienced a difficult time focusing.

## 2022-12-23 NOTE — GROUP NOTE
Group Therapy Documentation    PATIENT'S NAME: Annika Garrido  MRN:   [id number]  :   1965  [id number]  DATE OF SERVICE: 22  START TIME: 12:30 PM  END TIME:  2:30 PM  FACILITATOR(S): Vivi Shetty [facility]; Robin Reynolds [facility]; Susan Umanzor LADC  TOPIC: BEH Group Therapy  Number of patients attending the group:  21  Group Length:  2 Hours    Group Therapy Type: Recovery strategies    Summary of Group / Topics Discussed:    Recovery Principles    Patients viewed a film in lieu of the holidays. This film addressed: holiday stress, family values, relationships, expressions of love, friendship, and compassion. Patients discussed thoughts on the film and any relatable experiences. Patients were able to discuss stress during the holidays, and feeling lonely and how that effects their state of being.           Group Attendance:  Attended group session    Patient's response to the group topic/interactions:  cooperative with task    Patient appeared to be Actively participating.        Client specific details:  Annika attended PM group therapy. Patient engaged in discussion and participated in sharing feedback to his peers.

## 2022-12-24 ENCOUNTER — HOSPITAL ENCOUNTER (OUTPATIENT)
Dept: BEHAVIORAL HEALTH | Facility: CLINIC | Age: 57
Discharge: HOME | End: 2022-12-24
Attending: FAMILY MEDICINE
Payer: MEDICAID

## 2022-12-24 PROCEDURE — 1002N00001 HC LODGING PLUS FACILITY CHARGE ADULT

## 2022-12-24 PROCEDURE — H2035 A/D TX PROGRAM, PER HOUR: HCPCS | Mod: HQ

## 2022-12-24 NOTE — GROUP NOTE
Psychoeducation Group Documentation    PATIENT'S NAME: Annika Garrido  MRN:   [id number]  :   1965  [facility] NUMBER: [id number]  DATE OF SERVICE: 22  START TIME: 12:30 PM  END TIME:  2:30 PM  FACILITATOR(S): Saida Lange; Dave Morales LADC  TOPIC: BEH Pyschoeducation  Number of patients attending the group:  11  Group Length:  2 Hours    Skills Group Therapy Type: Recovery skills    Summary of Group / Topics Discussed:    Relationship/social skills          Group Attendance:  Attended group session    Patient's response to the group topic/interactions:  cooperative with task    Patient appeared to be Engaged.         Client specific details: Patient participated in an activity to help with healthy socialization skills for long term-recovery.  each patient shared their experience and how this activity related to their issues and recovery.

## 2022-12-24 NOTE — GROUP NOTE
"Psychoeducation Group Documentation    PATIENT'S NAME: Annika Garrido  MRN:   [id number]  :   1965  [facility] NUMBER: [id number]  DATE OF SERVICE: 22  START TIME:  9:00 AM  END TIME: 11:00 AM  FACILITATOR(S): Francisca Foley LADC; Dave Morales LADC  TOPIC: BEH Pyschoeducation  Number of patients attending the group:  11  Group Length:  2 Hours    Skills Group Therapy Type: Recovery skills and Daily living/independence skills    Summary of Group / Topics Discussed:    Gratefulness during the holidays!          Group Attendance:  Attended group session    Patient's response to the group topic/interactions:  cooperative with task    Patient appeared to be Actively participating.         Client specific details: Patients attended a skills lecture to express their \"Graditute\" that they have not expressed before. Patients gained knowledge on how expression of thankfulness can assist in abstinence goals and support recovery. Each patient had an opportunity to process the information, ask questions of the lecturer, and provide constructive feedback.         "

## 2022-12-25 ENCOUNTER — HOSPITAL ENCOUNTER (OUTPATIENT)
Dept: BEHAVIORAL HEALTH | Facility: CLINIC | Age: 57
Discharge: HOME | End: 2022-12-25
Attending: FAMILY MEDICINE
Payer: MEDICAID

## 2022-12-25 PROCEDURE — H2035 A/D TX PROGRAM, PER HOUR: HCPCS | Mod: HQ

## 2022-12-25 PROCEDURE — 1002N00001 HC LODGING PLUS FACILITY CHARGE ADULT

## 2022-12-25 NOTE — GROUP NOTE
"Psychoeducation Group Documentation    PATIENT'S NAME: Annika Garrido  MRN:   [id number]  :   1965  [facility] NUMBER: [id number]  DATE OF SERVICE: 22  START TIME: 12:30 PM  END TIME:  2:30 PM  FACILITATOR(S): Dave Morales LADC  TOPIC: BEH Pyschoeducation  Number of patients attending the group:  10  Group Length:  2 Hours    Skills Group Therapy Type: Recovery skills and Relationship skills development    Summary of Group / Topics Discussed:    Relationship/social skills          Group Attendance:  Attended group session    Patient's response to the group topic/interactions:  cooperative with task    Patient appeared to be Attentive.         Client specific details: Pt's watched \"[place]\".  Pt's then discussed how to implement these skills as part of their recovery program. Pt's had an opportunity to process the information seen/noticed, and ask questions.        "

## 2022-12-25 NOTE — GROUP NOTE
Psychoeducation Group Documentation    PATIENT'S NAME: Annika Garrido  MRN:   [id number]  :   1965  [facility] NUMBER: [id number]  DATE OF SERVICE: 22  START TIME:  8:45 AM  END TIME: 10:30 AM  FACILITATOR(S): Zeeshan Torres LADC; Sixto Uriostegui LADC; Dave Morales LADC  TOPIC: BEH Pyschoeducation  Number of patients attending the group:  10  Group Length:  2 Hours    Skills Group Therapy Type: Healthy behaviors development    Summary of Group / Topics Discussed:    Balanced lifestyle skills          Group Attendance:  Attended group session    Patient's response to the group topic/interactions:  cooperative with task    Patient appeared to be Attentive and Engaged.         Client specific details:  The patient participated in the morning lecture on HIV/AIDS.

## 2022-12-26 ENCOUNTER — HOSPITAL ENCOUNTER (OUTPATIENT)
Dept: BEHAVIORAL HEALTH | Facility: CLINIC | Age: 57
Discharge: HOME | End: 2022-12-26
Attending: FAMILY MEDICINE
Payer: MEDICAID

## 2022-12-26 DIAGNOSIS — F19.20 CHEMICAL DEPENDENCY (H): ICD-10-CM

## 2022-12-26 LAB — SARS-COV-2 RNA RESP QL NAA+PROBE: NEGATIVE

## 2022-12-26 PROCEDURE — U0003 INFECTIOUS AGENT DETECTION BY NUCLEIC ACID (DNA OR RNA); SEVERE ACUTE RESPIRATORY SYNDROME CORONAVIRUS 2 (SARS-COV-2) (CORONAVIRUS DISEASE [COVID-19]), AMPLIFIED PROBE TECHNIQUE, MAKING USE OF HIGH THROUGHPUT TECHNOLOGIES AS DESCRIBED BY CMS-2020-01-R: HCPCS

## 2022-12-26 PROCEDURE — 1002N00001 HC LODGING PLUS FACILITY CHARGE ADULT

## 2022-12-26 PROCEDURE — H2035 A/D TX PROGRAM, PER HOUR: HCPCS | Mod: HQ

## 2022-12-26 NOTE — GROUP NOTE
Group Therapy Documentation    PATIENT'S NAME: Annika Garrido  MRN:   [id number]  :   1965  [facility] NUMBER: [id number]  DATE OF SERVICE: 22  START TIME:  9:00 AM  END TIME: 11:00 AM  FACILITATOR(S): Zeeshan Torres LADC  TOPIC: BEH Group Therapy  Number of patients attending the group:  10  Group Length:  2 Hours    Group Therapy Type: Recovery strategies    Summary of Group / Topics Discussed:    Coping/DBT informed care, Trauma informed care, Disease of addiction, and Emotions/expression      Group Attendance:  Attended group session    Patient's response to the group topic/interactions:  cooperative with task    Patient appeared to be Attentive and Engaged.        Client specific details:  Annika participated in morning group. She checked in and took part in the reading. This was followed by her listening to and giving positive feedback on her peer s assignment. The group ended with a discussion on positive and negative ways to fill a void.

## 2022-12-26 NOTE — GROUP NOTE
"Group Therapy Documentation    PATIENT'S NAME: Annika Garrido  MRN:   [id number]  :   1965  [facility] NUMBER: [id number]  DATE OF SERVICE: 22  START TIME: 12:30 PM  END TIME:  2:30 PM  FACILITATOR(S): Francisca Foley LADC  TOPIC: BEH Group Therapy  Number of patients attending the group:  10  Group Length:  2 Hours    Group Therapy Type: Recovery strategies    Summary of Group / Topics Discussed:    Sober coping skills, Emotions/expression, and Relapse prevention    Patients checked in to primary group by describing current mood and processing any current challenges they are experiencing. Several patients presented assignments. Facilitator led group discussion on motivation to change. Patients gained knowledge on the stages of change and how to assess where they are at on making behavioral changes to support their recovery goals. Patients discussed areas of change they struggle with and identified how to utilize Smart Recovery tools to help them make small changes towards larger goals. Each patient had an opportunity to process and provide constructive feedback to peers who shared.      Group Attendance:  Attended group session    Patient's response to the group topic/interactions:  cooperative with task    Patient appeared to be Actively participating, Attentive and Engaged.        Client specific details:   Patient fully participated in group discussion, providing appropriate feedback, utilizing active listening skills, and sharing personal experiences. Patient presented her \"Using My Support Network\" assignment, demonstrating insight in how she can utilize her support network of her spouse, sponsor, and  to support her recovery goals.      "

## 2022-12-27 ENCOUNTER — HOSPITAL ENCOUNTER (OUTPATIENT)
Dept: BEHAVIORAL HEALTH | Facility: CLINIC | Age: 57
Discharge: HOME | End: 2022-12-27
Attending: FAMILY MEDICINE
Payer: MEDICAID

## 2022-12-27 PROCEDURE — H2035 A/D TX PROGRAM, PER HOUR: HCPCS | Mod: HQ

## 2022-12-27 PROCEDURE — 1002N00001 HC LODGING PLUS FACILITY CHARGE ADULT

## 2022-12-27 NOTE — GROUP NOTE
Group Therapy Documentation    PATIENT'S NAME: Annika Garrido  MRN:   [id number]  :   1965  [id number]  DATE OF SERVICE: 22  START TIME:  9:00 AM  END TIME: 11:00 AM  FACILITATOR(S): Zeeshan Torres LADC; Eduarda Hilton  TOPIC: BEH Group Therapy  Number of patients attending the group:  10  Group Length:  2 Hours    Group Therapy Type: Recovery strategies    Summary of Group / Topics Discussed:    Recovery Principles and Spiritual Care      Group Attendance:  Attended group session    Patient's response to the group topic/interactions:  cooperative with task    Patient appeared to be Attentive and Engaged.        Client specific details:  Annika participated in morning group on spiritual care.

## 2022-12-27 NOTE — GROUP NOTE
Group Therapy Documentation    PATIENT'S NAME: Annika Garrido  MRN:   [id number]  [facility]   1965  [id number]  DATE OF SERVICE: 22  START TIME: 12:30 PM  END TIME:  2:30 PM  FACILITATOR(S): Francisca Foley LADC  TOPIC: BEH Group Therapy  Number of patients attending the group:  10  Group Length:  2 Hours    Group Therapy Type: Recovery strategies    Summary of Group / Topics Discussed:    Recovery Principles, Sober coping skills, and Cognitive behavioral therapy skills    Facilitator led group discussion on positive affirmations. Patients gained knowledge on how to use affirmations to boost self-esteem, and foster positive behavioral changes. Patients also learned how using affirmations daily can increase sense of self-worth and stop self-destructing behaviors. Patients identified a negative self-statement and a personal challenge they are experiencing and practiced writing affirmations to create more solution focused self-talk. Each patient had an opportunity to process and provide constructive feedback to peers who shared.      Group Attendance:  Attended group session    Patient's response to the group topic/interactions:  cooperative with task    Patient appeared to be Actively participating, Attentive and Engaged.        Client specific details:  Patient fully engaged in group discussion and affirmation writing skills activity. Patient provided appropriate feedback and shared personal insights on the topic.
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
## 2022-12-27 NOTE — GROUP NOTE
Group Therapy Documentation    PATIENT'S NAME: Annika Garrido  MRN:   [id number]  :   1965  [id number]  DATE OF SERVICE: 22  START TIME:  3:00 PM  END TIME:  4:00 PM  FACILITATOR(S): Robin Reynolds LADC  TOPIC: BEH Group Therapy  Number of patients attending the group:  20  Group Length:  1 Hours    Group Therapy Type: Recovery strategies and Daily living/independence skills    Summary of Group / Topics Discussed:    Sober coping skills and Cognitive behavioral therapy skills    Patients participated in a group lecture regarding Dialectical Behavioral Therapy and related skills.       Group Attendance:  Attended group session    Patient's response to the group topic/interactions:  cooperative with task    Patient appeared to be Actively participating, Attentive and Engaged.        Client specific details: Patient actively engaged in group lecture/dscussion.

## 2022-12-28 ENCOUNTER — HOSPITAL ENCOUNTER (OUTPATIENT)
Dept: BEHAVIORAL HEALTH | Facility: CLINIC | Age: 57
Discharge: HOME | End: 2022-12-28
Attending: FAMILY MEDICINE
Payer: MEDICAID

## 2022-12-28 PROCEDURE — 1002N00001 HC LODGING PLUS FACILITY CHARGE ADULT

## 2022-12-28 PROCEDURE — H2035 A/D TX PROGRAM, PER HOUR: HCPCS | Mod: HQ

## 2022-12-28 NOTE — GROUP NOTE
"Group Therapy Documentation    PATIENT'S NAME: Annika Garrido  MRN:   [id number]  :   1965  ACCT. NUMBER: [id number]  DATE OF SERVICE: 22  START TIME:  9:45 AM  END TIME: 11:30 AM  FACILITATOR(S): Dave Morales LADC  TOPIC: BEH Group Therapy  Number of patients attending the group:  10  Group Length:  2 Hours    Group Therapy Type: Recovery strategies and Emotion processing    Summary of Group / Topics Discussed:    Cognitive behavioral therapy skills and Self-care activities      Group Attendance:  Attended group session    Patient's response to the group topic/interactions:  cooperative with task and discussed personal experience with topic    Patient appeared to be Engaged.        Client specific details: Patients checked in to primary group by describing current mood, stating an affirmation  Sober coping skills, Cognitive behavioral therapy skills, and Emotions expression.Facilitator led group on the discussion pertaining to\"Shame and Working Through Shame. Patients learned how to identify sources and influences of their unhealthy self-worth and discussed ways to build their confidence and self-esteem. Patients also gained knowledge on how self-esteem impacts substances use.       "

## 2022-12-28 NOTE — GROUP NOTE
"Group Therapy Documentation    PATIENT'S NAME: Annika Garrido  MRN:   [id number]  :   1965  ACCT. NUMBER: [id number]  DATE OF SERVICE: 22  START TIME: 12:30 PM  END TIME:  2:30 PM  FACILITATOR(S): Francisca Foley LADC  TOPIC: BEH Group Therapy  Number of patients attending the group:  10  Group Length:  2 Hours    Group Therapy Type: Recovery strategies    Summary of Group / Topics Discussed:    Sober coping skills, Coping/DBT informed care, and Relapse prevention    Facilitator led group discussion on the stages of relapse and relapse prevention. Patients gained knowledge on each of the three stages of relapse and learned how to recognize behavior changes to monitor. Patients also learned the DBT skill \"Urge Surfing\" to manage cravings and triggers and avert return to use of substances. Each patient had an opportunity to process and provide constructive feedback.      Group Attendance:  Attended group session    Patient's response to the group topic/interactions:  cooperative with task    Patient appeared to be Actively participating, Attentive and Engaged.        Client specific details:  Patient fully engaged in group discussion, providing appropriate feedback, and sharing personal experiences with past return to use episodes.      "

## 2022-12-28 NOTE — GROUP NOTE
Psychoeducation Group Documentation    PATIENT'S NAME: Annika Garrido  MRN:   [id number]  :   1965  ACCT. NUMBER: [id number]  DATE OF SERVICE: 22  START TIME:  8:30 AM  END TIME:  9:30 AM  FACILITATOR(S): Merrill Guardado LADC; Saida Lange  TOPIC: BEH Pyschoeducation  Number of patients attending the group:  10  Group Length:  1 Hours    Skills Group Therapy Type: Recovery skills    Summary of Group / Topics Discussed:    Balanced lifestyle skills          Group Attendance:  Attended group session    Patient's response to the group topic/interactions:  cooperative with task    Patient appeared to be Attentive.         Client specific details:  Lesly gave appropriate feedback..

## 2022-12-29 ENCOUNTER — HOSPITAL ENCOUNTER (OUTPATIENT)
Dept: BEHAVIORAL HEALTH | Facility: CLINIC | Age: 57
Discharge: HOME | End: 2022-12-29
Attending: FAMILY MEDICINE
Payer: MEDICAID

## 2022-12-29 PROCEDURE — H2035 A/D TX PROGRAM, PER HOUR: HCPCS | Mod: HQ

## 2022-12-29 PROCEDURE — 1002N00001 HC LODGING PLUS FACILITY CHARGE ADULT

## 2022-12-29 NOTE — GROUP NOTE
Psychoeducation Group Documentation    PATIENT'S NAME: Annika Garrido  MRN:   [id number]  :   1965  ACCT. NUMBER: [id number]  DATE OF SERVICE: 22  START TIME:  3:00 PM  END TIME:  4:00 PM  FACILITATOR(S): Zeeshan Torres LADC; Robin Reynolds LADC; Saida Lange  TOPIC: BEH Pyschoeducation  Number of patients attending the group:  9  Group Length:  1 Hours    Skills Group Therapy Type: Daily living/independence skills    Summary of Group / Topics Discussed:    Balanced lifestyle skills          Group Attendance:  Attended group session    Patient's response to the group topic/interactions:  cooperative with task    Patient appeared to be Attentive and Engaged.         Client specific details:  The patient participated in the afternoon lecture on daily life skills.

## 2022-12-29 NOTE — GROUP NOTE
"Group Therapy Documentation    PATIENT'S NAME: nAnika Garrido  MRN:   [id number]  :   1965  ACCT. NUMBER: [id number]  DATE OF SERVICE: 22  START TIME: 12:30 PM  END TIME:  2:30 PM  FACILITATOR(S): Zeeshan Torres LADC  TOPIC: BEH Group Therapy  Number of patients attending the group:  10  Group Length:  2 Hours    Group Therapy Type: Recovery strategies    Summary of Group / Topics Discussed:    Recovery Principles, Coping/DBT informed care, Trauma informed care, Disease of addiction, and Emotions/expression      Group Attendance:  Attended group session    Patient's response to the group topic/interactions:  cooperative with task    Patient appeared to be Attentive and Engaged.        Client specific details:  Annika participated in afternoon group. She took part in a reading and discussion on \"To Be A Woman\". For the second part of group she listened to and gave positive feedback on a peer s assignment.      "

## 2022-12-29 NOTE — GROUP NOTE
"Group Therapy Documentation    PATIENT'S NAME: Annika Garrido  MRN:   [id number]  :   1965  ACCT. NUMBER: [id number]  DATE OF SERVICE: 22  START TIME:  9:00 AM  END TIME: 11:00 AM  FACILITATOR(S): Dave Morales LADC; Francisca Foley LADC  TOPIC: BEH Group Therapy  Number of patients attending the group:  10  Group Length:  2 Hours    Group Therapy Type: Emotion processing and treatment plan assignment presented.     Summary of Group / Topics Discussed:    Balanced lifestyle and Emotions/expression      Group Attendance:  Attended group session    Patient's response to the group topic/interactions:  cooperative with task and gave appropriate feedback to peers    Patient appeared to be Actively participating.        Client specific details:  Patients checked in to primary group by describing current mood, stating an positive affirmation for today. Facilitator led group discussion on \" 1st step assignment\". Patient gave positive feed back.       "

## 2022-12-29 NOTE — ADDENDUM NOTE
Encounter addended by: Francisca Foley LADC on: 12/29/2022 8:23 AM   Actions taken: Charge Capture section accepted

## 2022-12-30 ENCOUNTER — HOSPITAL ENCOUNTER (OUTPATIENT)
Dept: BEHAVIORAL HEALTH | Facility: CLINIC | Age: 57
Discharge: HOME | End: 2022-12-30
Attending: FAMILY MEDICINE
Payer: MEDICAID

## 2022-12-30 PROCEDURE — H2035 A/D TX PROGRAM, PER HOUR: HCPCS | Mod: HQ

## 2022-12-30 PROCEDURE — 1002N00001 HC LODGING PLUS FACILITY CHARGE ADULT

## 2022-12-30 NOTE — GROUP NOTE
"Group Therapy Documentation    PATIENT'S NAME: Annika Garrido  MRN:   [id number]  :   1965  ACCT. NUMBER: [id number]  DATE OF SERVICE: 22  START TIME:  9:00 AM  END TIME: 11:00 AM  FACILITATOR(S): Dave Morales LADC  TOPIC: BEH Group Therapy  Number of patients attending the group:  8  Group Length:  2 Hours    Group Therapy Type: Emotion processing and Health and wellbeing     Summary of Group / Topics Discussed:    Spiritual Care, Cognitive behavioral therapy skills, and Emotions/expression      Group Attendance:  Attended group session    Patient's response to the group topic/interactions:  cooperative with task    Patient appeared to be Engaged.        Client specific details: Annika, checked in to primary group by describing current mood, physical health, intellectual process, and spiritual health. Patient stating a positive affirmation, and answered question  \"How will I implement Self-Care\" over the weekend.       "

## 2022-12-30 NOTE — GROUP NOTE
Group Therapy Documentation    PATIENT'S NAME: Annika Garrido  MRN:   [id number]  :   1965  [id number]  DATE OF SERVICE: 22  START TIME: 12:30 PM  END TIME:  2:30 PM  FACILITATOR(S): Judit Katz LADC; Vivi Shetty LADC; Zeeshan Torres [facility]  TOPIC: BEH Group Therapy  Number of patients attending the group: 19  Group Length:  2 Hours    Group Therapy Type: Recovery strategies and Emotion processing    Summary of Group / Topics Discussed:    Relationship/socialization, Emotions/expression, and Relapse prevention    Group Attendance:  Attended group session    Patient's response to the group topic/interactions:  cooperative with task    Patient appeared to be Attentive and Engaged.        Client specific details: Pt watched a recovery-related film with peers and participated in subsequent discussion.

## 2022-12-30 NOTE — ADDENDUM NOTE
Encounter addended by: Zeeshan Torres LADC on: 12/30/2022 8:39 AM   Actions taken: Charge Capture section accepted

## 2022-12-31 ENCOUNTER — HOSPITAL ENCOUNTER (OUTPATIENT)
Dept: BEHAVIORAL HEALTH | Facility: CLINIC | Age: 57
Discharge: HOME | End: 2022-12-31
Attending: FAMILY MEDICINE
Payer: MEDICAID

## 2022-12-31 PROCEDURE — H2035 A/D TX PROGRAM, PER HOUR: HCPCS | Mod: HQ

## 2022-12-31 PROCEDURE — 1002N00001 HC LODGING PLUS FACILITY CHARGE ADULT

## 2022-12-31 NOTE — GROUP NOTE
Group Therapy Documentation    PATIENT'S NAME: Annika Garrido  MRN:   [id number]  :   1965  [id number]  DATE OF SERVICE: 22  START TIME: 12:30 PM  END TIME:  2:30 PM  FACILITATOR(S): Susan Umanzor LADC; Sixto Uriostegui LADC  TOPIC: BEH Group Therapy  Number of patients attending the group: 19  Group Length:  2 Hours    Group Therapy Type: Recovery strategies    Summary of Group / Topics Discussed:    Recovery Principles and Relapse prevention      Group Attendance:  Attended group session    Patient's response to the group topic/interactions:  cooperative with task    Patient appeared to be Attentive and Engaged.        Client specific details: Annika was an active participant in workshop on relapse prevention strategies.

## 2022-12-31 NOTE — GROUP NOTE
Group Therapy Documentation    PATIENT'S NAME: Annika Garrido  MRN:   [id number]  [facility]   1965  [id number]  DATE OF SERVICE: 22  START TIME:  9:00 AM  END TIME: 11:00 AM  FACILITATOR(S): Susan Umanzor LADC; Sixto Uriostegui LADC; Shani Montemayor LADC  TOPIC: BEH Group Therapy  Number of patients attending the group: 19  Group Length:  2 Hours    Group Therapy Type: Recovery strategies    Summary of Group / Topics Discussed:    Recovery Principles and Relationship/socialization      Group Attendance:  Attended group session    Patient's response to the group topic/interactions:  cooperative with task    Patient appeared to be Attentive and Engaged.        Client specific details: Annika was an active participant in workshop on healthy relationships.

## 2023-01-01 ENCOUNTER — HOSPITAL ENCOUNTER (OUTPATIENT)
Dept: BEHAVIORAL HEALTH | Facility: CLINIC | Age: 58
Discharge: HOME | End: 2023-01-01
Attending: FAMILY MEDICINE
Payer: COMMERCIAL

## 2023-01-01 PROCEDURE — 1002N00001 HC LODGING PLUS FACILITY CHARGE ADULT

## 2023-01-01 PROCEDURE — H2035 A/D TX PROGRAM, PER HOUR: HCPCS | Mod: HQ

## 2023-01-01 NOTE — GROUP NOTE
Group Therapy Documentation    PATIENT'S NAME: Annika Garrido  MRN:   [id number]  :   1965  ACCT. NUMBER: [id number]  DATE OF SERVICE: 23  START TIME:  8:45 AM  END TIME: 10:45 AM  FACILITATOR(S): Delmer Louise LADC  TOPIC: BEH Group Therapy  Number of patients attending the group:  18  Group Length:  2 Hours    Group Therapy Type: Health and wellbeing     Summary of Group / Topics Discussed:    Recovery Principles, Co-occurring illnesses symptom management, and Self-care activities      Group Attendance:  Attended group session    Patient's response to the group topic/interactions:  cooperative with task and discussed personal experience with topic    Patient appeared to be Actively participating, Attentive and Engaged.        Client specific details:  Annika listened to and appropriately participated in a lecture on Hepatitis C.

## 2023-01-01 NOTE — GROUP NOTE
Group Therapy Documentation    PATIENT'S NAME: Annika Garrido  MRN:   [id number]  :   1965  [id number]  DATE OF SERVICE: 23  START TIME: 12:30 PM  END TIME:  1:30 PM  FACILITATOR(S): Delmer Louise LADC; Judit Katz LADC  TOPIC: BEH Group Therapy  Number of patients attending the group: 18  Group Length:  1 Hours    Group Therapy Type: Recovery strategies    Summary of Group / Topics Discussed:    Sober coping skills, Relationship/socialization, and Balanced lifestyle    Group Attendance:  Attended group session    Patient's response to the group topic/interactions:  cooperative with task    Patient appeared to be Attentive and Engaged.        Client specific details: Pt listened respectfully to a presentation on critical thinking skills and participated in discussion.

## 2023-01-02 ENCOUNTER — HOSPITAL ENCOUNTER (OUTPATIENT)
Dept: BEHAVIORAL HEALTH | Facility: CLINIC | Age: 58
Discharge: HOME | End: 2023-01-02
Attending: FAMILY MEDICINE
Payer: COMMERCIAL

## 2023-01-02 PROCEDURE — H2035 A/D TX PROGRAM, PER HOUR: HCPCS | Mod: HQ

## 2023-01-02 PROCEDURE — 1002N00001 HC LODGING PLUS FACILITY CHARGE ADULT

## 2023-01-02 NOTE — GROUP NOTE
"Group Therapy Documentation    PATIENT'S NAME: Annika Garrido  MRN:   [id number]  :   1965  ACCT. NUMBER: [id number]  DATE OF SERVICE: 23  START TIME: 12:30 PM  END TIME:  2:30 PM  FACILITATOR(S): Robin Reynolds LADC  TOPIC: BEH Group Therapy  Number of patients attending the group:  6  Group Length:  2 Hours    Group Therapy Type: Emotion processing    Summary of Group / Topics Discussed:    Emotions/expression      Group activity focused on the presentation of assignments from several group participants. Participants provided feedback throughout group session.      Group Attendance:  Attended group session    Patient's response to the group topic/interactions:  cooperative with task    Patient appeared to be Actively participating, Attentive and Engaged.        Client specific details:  Patient shared \"Relapse Prevention\" assignment.      "

## 2023-01-02 NOTE — GROUP NOTE
Group Therapy Documentation    PATIENT'S NAME: Annika Garrido  MRN:   [id number]  :   1965  [id number]  DATE OF SERVICE: 23  START TIME:  9:00 AM  END TIME: 11:00 AM  FACILITATOR(S): Zeeshan Torres LADC  TOPIC: BEH Group Therapy  Number of patients attending the group:  6  Group Length:  2 Hours    Group Therapy Type: Recovery strategies    Summary of Group / Topics Discussed:    Coping/DBT informed care, Trauma informed care, Disease of addiction, and Emotions/expression      Group Attendance:  Attended group session    Patient's response to the group topic/interactions:  cooperative with task    Patient appeared to be Attentive and Engaged.        Client specific details:  Annika participated in morning group. She checked in and took part in the reading. She took part in her peer's graduation. She listened to and gave positive feedback on her peer s assignment.
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
## 2023-01-03 ENCOUNTER — HOSPITAL ENCOUNTER (OUTPATIENT)
Dept: BEHAVIORAL HEALTH | Facility: CLINIC | Age: 58
Discharge: HOME | End: 2023-01-03
Attending: FAMILY MEDICINE
Payer: COMMERCIAL

## 2023-01-03 PROCEDURE — H2035 A/D TX PROGRAM, PER HOUR: HCPCS | Mod: HQ

## 2023-01-03 PROCEDURE — 1002N00001 HC LODGING PLUS FACILITY CHARGE ADULT

## 2023-01-03 NOTE — GROUP NOTE
"Group Therapy Documentation    PATIENT'S NAME: Annika Garrido  MRN:   [id number]  :   1965  ACCT. NUMBER: [id number]  DATE OF SERVICE: 23  START TIME:  9:00 AM  END TIME: 11:00 AM  FACILITATOR(S): Francisca Foley LADC  TOPIC: BEH Group Therapy  Number of patients attending the group:  4  Group Length:  2 Hours    Group Therapy Type: Recovery strategies    Summary of Group / Topics Discussed:    Sober coping skills, Coping/DBT informed care, and Trauma informed care    Facilitator led group discussion on impulsivity. Patients were introduced to the DBT skill \"Chain Analysis of Behavior,\" gaining knowledge on how past experiences can result in emotional overreaction leading to maladaptive behavior. Patients used the Chain Analysis model to gain insight on how specific emotions influence both thoughts and behaviors, potentially leading to return to use of substances. Each patient had an opportunity to process the information and provide constructive feedback to peers who shared.      Group Attendance:  Attended group session    Patient's response to the group topic/interactions:  cooperative with task    Patient appeared to be Actively participating, Attentive and Engaged.        Client specific details:  Patient fully engaged in group discussion and DBT skills practice. Patient shared, \"I learned to be assertive a long time ago, if I let things sit, I become resentful.\"      "

## 2023-01-03 NOTE — GROUP NOTE
Psychoeducation Group Documentation    PATIENT'S NAME: Annika Garrido  MRN:   [id number]  :   1965  ACCT. NUMBER: [id number]  DATE OF SERVICE: 23  START TIME:  3:00 PM  END TIME:  4:00 PM  FACILITATOR(S): Mabel Victor LAD; Judit Katz LADC; Saida Lange  TOPIC: BEH Pyschoeducation  Number of patients attending the group:    Group Length:  1 Hours    Skills Group Therapy Type: Recovery skills, background information on CD and MH By DR. Dominique.    Summary of Group / Topics Discussed:    Symptom management skills          Group Attendance:  Attended group session    Patient's response to the group topic/interactions:  cooperative with task    Patient appeared to be Attentive.         Client specific details: Pt was appropriate and attentive in PM skills group.

## 2023-01-03 NOTE — GROUP NOTE
Group Therapy Documentation    PATIENT'S NAME: Annika Garrido  MRN:   [id number]  :   1965  [id number]  DATE OF SERVICE: 23  START TIME: 12:30 PM  END TIME:  2:30 PM  FACILITATOR(S): Mabel Victor LADC  TOPIC: BEH Group Therapy  Number of patients attending the group: 4  Group Length:  2 Hours    Group Therapy Type: Emotion processing    Summary of Group / Topics Discussed:    Spiritual Care      Group Attendance:  Attended group session    Patient's response to the group topic/interactions:  cooperative with task    Patient appeared to be Engaged.        Client specific details: Pt participated in spiritual care group, led by Eduarda Hilton, and observed by this counselor. She engaged in activities about recovery, spirituality, and mindfulness.

## 2023-01-04 ENCOUNTER — HOSPITAL ENCOUNTER (OUTPATIENT)
Dept: BEHAVIORAL HEALTH | Facility: CLINIC | Age: 58
Discharge: HOME | End: 2023-01-04
Attending: FAMILY MEDICINE
Payer: COMMERCIAL

## 2023-01-04 PROCEDURE — H2035 A/D TX PROGRAM, PER HOUR: HCPCS | Mod: HQ

## 2023-01-04 PROCEDURE — 1002N00001 HC LODGING PLUS FACILITY CHARGE ADULT

## 2023-01-04 NOTE — GROUP NOTE
Group Therapy Documentation    PATIENT'S NAME: Annika Garrido  MRN:   [id number]  :   1965  [id number]  DATE OF SERVICE: 23  START TIME:  9:45 AM  END TIME: 11:30 AM  FACILITATOR(S): Robin Reynolds LADC  TOPIC: BEH Group Therapy  Number of patients attending the group:  4  Group Length:  2 Hours    Group Therapy Type: Recovery strategies, Emotion processing, and Daily living/independence skills    Summary of Group / Topics Discussed:    Sober coping skills, Relationship/socialization, and Relapse prevention    Group discussion began with check-ins followed by the topics of relapse triggers, boundaries, and communication styles. Participants provided and accepted feedback from each other throughout group session.       Group Attendance:  Attended group session    Patient's response to the group topic/interactions:  cooperative with task    Patient appeared to be Actively participating, Attentive and Engaged.        Client specific details:  Patient engaged in group discussion, identifying challenging situations with trying to maintain boundaries.

## 2023-01-04 NOTE — GROUP NOTE
Group Therapy Documentation    PATIENT'S NAME: Annika Garrido  MRN:   [id number]  :   1965  [id number]  DATE OF SERVICE: 23  START TIME: 12:30 PM  END TIME:  2:30 PM  FACILITATOR(S): Robin Reynolds LADC  TOPIC: BEH Group Therapy  Number of patients attending the group:  9  Group Length:  2 Hours    Group Therapy Type: Recovery strategies, Emotion processing, Daily living/independence skills, and Health and wellbeing     Summary of Group / Topics Discussed:    Recovery Principles, Sober coping skills, Disease of addiction, and Relapse prevention    Group participants engaged in group lecture/activity.       Group Attendance:  Attended group session    Patient's response to the group topic/interactions:  cooperative with task    Patient appeared to be Actively participating, Attentive and Engaged.        Client specific details: Patient actively engaged in group lecture/activity .

## 2023-01-04 NOTE — GROUP NOTE
Psychoeducation Group Documentation    PATIENT'S NAME: Annika Garrido  MRN:   [id number]  :   1965  ACCT. NUMBER: [id number]  DATE OF SERVICE: 23  START TIME:  8:30 AM  END TIME:  9:30 AM  FACILITATOR(S): Merrill Guardado [facility]; Henrique Suarez, PhD LP  TOPIC: BEH Pyschoeducation  Number of patients attending the group:  4  Group Length:  1 Hours    Skills Group Therapy Type: Recovery skills and Emotion regulation skills    Summary of Group / Topics Discussed:    Balanced lifestyle skills and Relapse prevention skills          Group Attendance:  Attended group session    Patient's response to the group topic/interactions:  cooperative with task    Patient appeared to be Attentive.         Client specific details:  Annika gave appropriate feedback..

## 2023-01-05 ENCOUNTER — HOSPITAL ENCOUNTER (OUTPATIENT)
Dept: BEHAVIORAL HEALTH | Facility: CLINIC | Age: 58
Discharge: HOME | End: 2023-01-05
Attending: FAMILY MEDICINE
Payer: COMMERCIAL

## 2023-01-05 PROCEDURE — 1002N00001 HC LODGING PLUS FACILITY CHARGE ADULT

## 2023-01-05 PROCEDURE — H2035 A/D TX PROGRAM, PER HOUR: HCPCS | Mod: HQ

## 2023-01-05 NOTE — GROUP NOTE
Psychoeducation Group Documentation    PATIENT'S NAME: Annika Garrido  MRN:   [id number]  :   1965  ACCT. NUMBER: [id number]  DATE OF SERVICE: 23  START TIME:  3:00 PM  END TIME:  4:00 PM  FACILITATOR(S): Dave Morales LADC; Zeeshan Torres [facility]; Mabel Victor [facility]  TOPIC: BEH Pyschoeducation  Number of patients attending the group: 16  Group Length:  1 Hours    Skills Group Therapy Type: Recovery skills,     Summary of Group / Topics Discussed:    Relapse prevention skills, Gambling lecture (By  Nereyda Mejia,)          Group Attendance:  Attended group session    Patient's response to the group topic/interactions:  cooperative with task    Patient appeared to be Attentive.         Client specific details: Pt was appropriate and attentive in PM Skills group

## 2023-01-05 NOTE — GROUP NOTE
Group Therapy Documentation    PATIENT'S NAME: Annika Garrido  MRN:   [id number]  :   1965  [id number]  DATE OF SERVICE: 23  START TIME: 12:30 PM  END TIME:  2:30 PM  FACILITATOR(S): Mabel Victor LADC  TOPIC: BEH Group Therapy  Number of patients attending the group: 5  Group Length:  2 Hours    Group Therapy Type: Recovery strategies    Summary of Group / Topics Discussed:    Sober coping skills and Disease of addiction      Group Attendance:  Attended group session    Patient's response to the group topic/interactions:  cooperative with task    Patient appeared to be Actively participating and Engaged.        Client specific details: Patient participated in reading  Living Sober  most of the topics of discussion were on Anger, Resentment, Not taking the first substance of use and First things First. Patient was able to verbalize how she can relate with the different chapters and the benefit of the reading to her recovery.

## 2023-01-05 NOTE — GROUP NOTE
"Group Therapy Documentation    PATIENT'S NAME: Annika Garrido  MRN:   [id number]  :   1965  ACCT. NUMBER: [id number]  DATE OF SERVICE: 23  START TIME:  9:00 AM  END TIME: 11:00 AM  FACILITATOR(S): Francisca Foley LADC  TOPIC: BEH Group Therapy  Number of patients attending the group:  5  Group Length:  2 Hours    Group Therapy Type: Recovery strategies    Summary of Group / Topics Discussed:    Trauma informed care and Disease of addiction    Patients checked in to primary group by describing current mood, stating an affirmation, and a gratitude. A new patient introduced herself to her peers. Several patients processed emotions. Facilitator led group discussion on stages of addiction. Patients gained knowledge on how to explore their substance use history and identify patterns in their use. Patients also developed insight in how their patterns of substance use assists them in developing effective coping strategies to avert return to use. Each patient had an opportunity to process and provide constructive feedback to peers.      Group Attendance:  Attended group session    Patient's response to the group topic/interactions:  cooperative with task    Patient appeared to be Actively participating, Attentive and Engaged.        Client specific details:  Patient fully engaged in group discussion. Patient shared, \"I struggle with grief and loss, I had a lot of death in my family over the past few years which causes me to use.\"        "

## 2023-01-06 ENCOUNTER — HOSPITAL ENCOUNTER (OUTPATIENT)
Dept: BEHAVIORAL HEALTH | Facility: CLINIC | Age: 58
Discharge: HOME | End: 2023-01-06
Attending: FAMILY MEDICINE
Payer: COMMERCIAL

## 2023-01-06 PROCEDURE — 1002N00001 HC LODGING PLUS FACILITY CHARGE ADULT

## 2023-01-06 PROCEDURE — H2035 A/D TX PROGRAM, PER HOUR: HCPCS | Mod: HQ

## 2023-01-06 NOTE — GROUP NOTE
"Group Therapy Documentation    PATIENT'S NAME: Annika Garrido  MRN:   [id number]  :   1965  ACCT. NUMBER: [id number]  DATE OF SERVICE: 23  START TIME:  9:00 AM  END TIME: 11:00 AM  FACILITATOR(S): Francisca Foley LADC  TOPIC: BEH Group Therapy  Number of patients attending the group:  5  Group Length:  2 Hours    Group Therapy Type: Recovery strategies    Summary of Group / Topics Discussed:    Cognitive behavioral therapy skills, Co-occurring illnesses symptom management, and Emotions/expression    Patients checked in to primary group by describing current mood. Facilitator led group discussion on emotion regulation. Patients were introduced to \"Nolvia's Stress Thermometer,\" gaining knowledge on how to regulate intense emotions by assessing the intensity of the emotion and determine if their emotions are within a functional range. Patients also learned how intense emotions can interfere with logical thought which can lead to return to use of substances. Patients then discussed ways to engage in self-care to maintain their emotions within a functional range. Each patient had an opportunity to process and provide constructive feedback to peers who shared.      Group Attendance:  Attended group session    Patient's response to the group topic/interactions:  cooperative with task    Patient appeared to be Actively participating, Attentive and Engaged.        Client specific details:  Patient fully participated in group discussion. Patient shared, \"I often do things for my family which causes me to not engage in self-care which has led to relapse.\"        "

## 2023-01-06 NOTE — GROUP NOTE
Group Therapy Documentation    PATIENT'S NAME: Annika Garrido  MRN:   [id number]  [facility]   1965  [id number]  DATE OF SERVICE: 23  START TIME: 12:30 PM  END TIME:  2:30 PM  FACILITATOR(S): Judit Katz [facility]; Srikanth Taylor [facility]; Dave Morales [facility]; Mabel Victor [facility]  TOPIC: BEH Group Therapy  Number of patients attending the group:  16  Group Length:  2 Hours    Group Therapy Type: Emotion processing    Summary of Group / Topics Discussed:    Recovery Principles, Emotions/expression, and Leisure explorations/use of leisure time      Group Attendance:  Attended group session    Patient's response to the group topic/interactions:  cooperative with task    Patient appeared to be Attentive.        Client specific details: Patient participated in recovery supportive activity, watching a therapeutic movie on impact of addiction in the family, and forgiveness. Pt was appropriate and attentive.

## 2023-01-07 ENCOUNTER — HOSPITAL ENCOUNTER (OUTPATIENT)
Dept: BEHAVIORAL HEALTH | Facility: CLINIC | Age: 58
Discharge: HOME | End: 2023-01-07
Attending: FAMILY MEDICINE
Payer: COMMERCIAL

## 2023-01-07 PROCEDURE — H2035 A/D TX PROGRAM, PER HOUR: HCPCS | Mod: HQ

## 2023-01-07 PROCEDURE — 1002N00001 HC LODGING PLUS FACILITY CHARGE ADULT

## 2023-01-07 NOTE — GROUP NOTE
Group Therapy Documentation    PATIENT'S NAME: Annika Garrido  MRN:   [id number]  :   1965  [id number]  DATE OF SERVICE: 23  START TIME: 10:00 AM  END TIME: 11:00 AM  FACILITATOR(S): Robin Reynolds LADC; Saida Lange; Francisca Foley LADC  TOPIC: BEH Group Therapy  Number of patients attending the group:  17  Group Length:  1 Hours    Group Therapy Type: Recovery strategies and Emotion processing    Summary of Group / Topics Discussed:    Sober coping skills, Relationship/socialization, and Emotions/expression    Group participants completed a personality test, followed by a related group discussion.       Group Attendance:  Attended group session    Patient's response to the group topic/interactions:  cooperative with task    Patient appeared to be Actively participating, Attentive and Engaged.        Client specific details: Patient engaged in group discussion/activity.

## 2023-01-07 NOTE — GROUP NOTE
Group Therapy Documentation    PATIENT'S NAME: Annika Garrido  MRN:   [id number]  :   1965  [facility] [id number]  DATE OF SERVICE: 23  START TIME: 12:30 PM  END TIME:  2:30 PM  FACILITATOR(S): Francisca Foley LADC; Robin Reynolds LADC; Saida Lange  TOPIC: BEH Group Therapy  Number of patients attending the group:  18  Group Length:  2 Hours    Group Therapy Type: Recovery strategies    Summary of Group / Topics Discussed:    Sober coping skills, Disease of addiction, and Relapse prevention    Patients participated in a relapse prevention skills workshop, gaining knowledge on how their behaviors in active use can show up as thought patterns in early recovery. Patients learned how to identify their addictive thought patterns and coping strategies to reframe these thoughts to avoid return to use of substances. Each patient had an opportunity to process the information, ask questions of the facilitator, and provide constructive feedback to peers who shared.      Group Attendance:  Attended group session    Patient's response to the group topic/interactions:  cooperative with task    Patient appeared to be Attentive and Engaged.        Client specific details:  Patient participated in relapse prevention workshop, demonstrating active listening skills, engaging in the skills activity, and providing appropriate feedback to peers who shared.

## 2023-01-08 ENCOUNTER — HOSPITAL ENCOUNTER (OUTPATIENT)
Dept: BEHAVIORAL HEALTH | Facility: CLINIC | Age: 58
Discharge: HOME | End: 2023-01-08
Attending: FAMILY MEDICINE
Payer: COMMERCIAL

## 2023-01-08 PROCEDURE — 1002N00001 HC LODGING PLUS FACILITY CHARGE ADULT

## 2023-01-08 PROCEDURE — H2035 A/D TX PROGRAM, PER HOUR: HCPCS | Mod: HQ

## 2023-01-08 NOTE — GROUP NOTE
Group Therapy Documentation    PATIENT'S NAME: Annika Garrido  MRN:   [id number]  :   1965  [id number]  DATE OF SERVICE: 23  START TIME: 12:30 PM  END TIME:  1:30 PM  FACILITATOR(S): Saida Lange  TOPIC: BEH Group Therapy  Number of patients attending the group:  18    Group Length:  1 Hours    Group Therapy Type: Recovery strategies, Emotion processing, and Daily living/independence skills    Summary of Group / Topics Discussed:    Recovery Principles, Sober coping skills, Relationship/socialization, and Relapse prevention      Group Attendance:  Attended group session    Patient's response to the group topic/interactions:  cooperative with task    Patient appeared to be Actively participating, Attentive and Engaged.        Client specific details:  Patient attended group session and was attentive and participative.

## 2023-01-08 NOTE — GROUP NOTE
Group Therapy Documentation    PATIENT'S NAME: Annika Garrido  MRN:   [id number]  [facility]   1965  [id number]  DATE OF SERVICE: 23  START TIME:  8:45 AM  END TIME: 10:45 AM  FACILITATOR(S): Gwendolyn Gordon RN; Robin Reynolds LADC; Saida Lange  TOPIC: BEH Group Therapy  Number of patients attending the group:  18  Group Length:  2 Hours    Group Therapy Type: Health and wellbeing     Summary of Group / Topics Discussed:    Balanced lifestyle and Nutrition      Group activity included a lecture from staff registered nurse regarding nutrition and healthy eating habits, followed by a related discussion.       Group Attendance:  Attended group session    Patient's response to the group topic/interactions:  cooperative with task    Patient appeared to be Actively participating, Attentive and Engaged.        Client specific details:  Patient actively engaged in group discussion/activity.

## 2023-01-09 ENCOUNTER — HOSPITAL ENCOUNTER (OUTPATIENT)
Dept: BEHAVIORAL HEALTH | Facility: CLINIC | Age: 58
Discharge: HOME | End: 2023-01-09
Attending: FAMILY MEDICINE
Payer: COMMERCIAL

## 2023-01-09 PROCEDURE — 1002N00001 HC LODGING PLUS FACILITY CHARGE ADULT

## 2023-01-09 PROCEDURE — H2035 A/D TX PROGRAM, PER HOUR: HCPCS | Mod: HQ
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
## 2023-01-09 NOTE — GROUP NOTE
Group Therapy Documentation    PATIENT'S NAME: Annika Garrido  MRN:   [id number]  :   1965  [id number]  DATE OF SERVICE: 23  START TIME: 12:30 PM  END TIME:  2:30 PM  FACILITATOR(S): Francisca Foley LADC  TOPIC: BEH Group Therapy  Number of patients attending the group:  6  Group Length:  2 Hours    Group Therapy Type: Recovery strategies    Summary of Group / Topics Discussed:    Trauma informed care, Relapse prevention, and Self-care activities    Facilitator led group discussion on managing stress and anxiety as part of daily self-care. Patients gained understanding on how to assess their stress levels and practiced grounding techniques to decrease stress and anxiety symptoms. Patients learned about the different styles of grounding techniques, practicing several breathing, paired muscle relaxation, and mental exercises, and rating the effectiveness these techniques had on their anxiety. Patients discussed strategies for using grounding techniques to manage triggers and cravings to avert return to use of substances. Each patient had an opportunity to process and provide constructive feedback to peers who shared.      Group Attendance:  Attended group session    Patient's response to the group topic/interactions:  cooperative with task    Patient appeared to be Actively participating, Attentive and Engaged.        Client specific details:  Patient fully participated in group discussion and grounding skills exercises. Patient shared personal insights and provided appropriate feedback through group session.

## 2023-01-09 NOTE — GROUP NOTE
"Group Therapy Documentation    PATIENT'S NAME: Annika Garrido  MRN:   [id number]  :   1965  [facility] NUMBER: [id number]  DATE OF SERVICE: 23  START TIME:  9:00 AM  END TIME: 11:00 AM  FACILITATOR(S): Mabel Victor LADC  TOPIC: BEH Group Therapy  Number of patients attending the group:  6  Group Length:  2 Hours    Group Therapy Type: Recovery strategies    Summary of Group / Topics Discussed:    Recovery Principles and Sober coping skills      Group Attendance:  Attended group session    Patient's response to the group topic/interactions:  cooperative with task    Patient appeared to be Actively participating and Engaged.        Client specific details: Pt participated in mindful breathing accompanied by music, using channel 47. Patient participated in discussing the importance of meditation to her recovery after this exercise. She checked in feeling as being very hopeful.  She participated in discussing part of the reading for today, and  Shared with the group a story about a funny mistake I have made ). Patient reports she is in treatment to fresh up or reset \"My mind\". She shares her goal is to stay sober.      "

## 2023-01-10 ENCOUNTER — HOSPITAL ENCOUNTER (OUTPATIENT)
Dept: BEHAVIORAL HEALTH | Facility: CLINIC | Age: 58
Discharge: HOME | End: 2023-01-10
Attending: FAMILY MEDICINE
Payer: COMMERCIAL

## 2023-01-10 PROCEDURE — H2035 A/D TX PROGRAM, PER HOUR: HCPCS | Mod: HQ

## 2023-01-10 PROCEDURE — 1002N00001 HC LODGING PLUS FACILITY CHARGE ADULT

## 2023-01-10 NOTE — GROUP NOTE
"Group Therapy Documentation    PATIENT'S NAME: Annika Garrido  MRN:   [id number]  :   1965  [facility] NUMBER: [id number]  DATE OF SERVICE: 1/10/23  START TIME:  9:00 AM  END TIME: 11:00 AM  FACILITATOR(S): Judit Katz LADC  TOPIC: BEH Group Therapy  Number of patients attending the group:  6  Group Length:  2 Hours    Group Therapy Type: Recovery strategies    Summary of Group / Topics Discussed:    Sober coping skills, Balanced lifestyle, and Relapse prevention    Group Attendance:  Attended group session     Patient's response to the group topic/interactions:  cooperative with task    Patient appeared to be Actively participating, Attentive and Engaged.        Client specific details: Pt reported feeling \"okay\" and shared that she is proud of making it back to tx. She offered appropriate feedback to her peer on her assignment.  "

## 2023-01-10 NOTE — GROUP NOTE
"Psychoeducation Group Documentation    PATIENT'S NAME: Annika Garrido  MRN:   [id number]  :   1965  [facility] NUMBER: [id number]  DATE OF SERVICE: 1/10/23  START TIME:  3:00 PM  END TIME:  4:00 PM  FACILITATOR(S): Judit Katz LADC; Saida Lange; Mabel Victor LADC  TOPIC: BEH Pyschoeducation  Number of patients attending the group:   Group Length:  1 Hours    Skills Group Therapy Type: Recovery skills and Emotion regulation skills    Summary of Group / Topics Discussed:    Symptom management skills          Group Attendance:  Attended group session    Patient's response to the group topic/interactions:  cooperative with task    Patient appeared to be Engaged.         Client specific details: Pt was appropriate and attentive in PM skills group, during Dr. Ledesma lecture on \"The effect of alcohol on the frontal lobe\".     "

## 2023-01-10 NOTE — GROUP NOTE
Group Therapy Documentation    PATIENT'S NAME: Annika Garrido  MRN:   [id number]  :   1965  [facility] NUMBER: [id number]  DATE OF SERVICE: 1/10/23  START TIME: 12:30 PM  END TIME:  2:30 PM  FACILITATOR(S): Zeeshan Torres LADC; Eduarda Hilton  TOPIC: BEH Group Therapy  Number of patients attending the group:  6  Group Length:  2 Hours    Group Therapy Type: Recovery strategies    Summary of Group / Topics Discussed:    Recovery Principles and Spiritual Care      Group Attendance:  Attended group session    Patient's response to the group topic/interactions:  cooperative with task    Patient appeared to be Attentive and Engaged.        Client specific details:  Annika participated in the afternoon group on Spirituality.

## 2023-01-11 ENCOUNTER — HOSPITAL ENCOUNTER (OUTPATIENT)
Dept: BEHAVIORAL HEALTH | Facility: CLINIC | Age: 58
Discharge: HOME | End: 2023-01-11
Attending: FAMILY MEDICINE
Payer: COMMERCIAL

## 2023-01-11 PROCEDURE — H2035 A/D TX PROGRAM, PER HOUR: HCPCS | Mod: HQ

## 2023-01-11 PROCEDURE — 1002N00001 HC LODGING PLUS FACILITY CHARGE ADULT

## 2023-01-11 NOTE — GROUP NOTE
Psychoeducation Group Documentation    PATIENT'S NAME: Annika Garrido  MRN:   [id number]  :   1965  [facility] NUMBER: [id number]  DATE OF SERVICE: 23  START TIME:  8:30 AM  END TIME:  9:30 AM  FACILITATOR(S): Merrill Guardado LADC; Robin Reynolds LADC; Saida Lange  TOPIC: BEH Pyschoeducation  Number of patients attending the group:  7  Group Length:  1 Hours    Skills Group Therapy Type: Recovery skills    Summary of Group / Topics Discussed:    Balanced lifestyle skills          Group Attendance:  Attended group session    Patient's response to the group topic/interactions:  cooperative with task    Patient appeared to be Attentive.         Client specific details:  Annika gave appropriate feedback..

## 2023-01-11 NOTE — GROUP NOTE
Group Therapy Documentation    PATIENT'S NAME: Annika Garrido  MRN:   [id number]  :   1965  [id number]  DATE OF SERVICE: 23  START TIME: 12:30 PM  END TIME:  2:30 PM  FACILITATOR(S): Francisca Foley LADC  TOPIC: BEH Group Therapy  Number of patients attending the group:  7  Group Length:  2 Hours    Group Therapy Type: Recovery strategies    Summary of Group / Topics Discussed:    Sober coping skills, Cognitive behavioral therapy skills, and Emotions/expression    Facilitator led group discussion on the emotion of anxiety. Patient's gained understanding of the difference between worry and anxiety, the benefits of these emotions, and the challenges these emotions can create in recovery. Patients discussed how anxiety shows up for them and practiced grounding techniques to decrease the intensity of anxiety. Patients also learned how anxiety correlates to unhealthy core beliefs and gained insight in how reframing distorted thinking patterns can reduce anxiety and help prevent return to use of substances. Each patient had an opportunity to process and provide constructive feedback to peers who shared.      Group Attendance:  Attended group session    Patient's response to the group topic/interactions:  cooperative with task    Patient appeared to be Actively participating, Attentive and Engaged.        Client specific details:  Patient fully engaged in group discussion, providing supportive feedback, and sharing personal insights on her anxiety symptoms.

## 2023-01-11 NOTE — GROUP NOTE
Group Therapy Documentation    PATIENT'S NAME: Annika Garrido  MRN:   [id number]  :   1965  [id number]  DATE OF SERVICE: 23  START TIME:  9:30 AM  END TIME: 11:30 AM  FACILITATOR(S): Dave Morales LADC  TOPIC: BEH Group Therapy  Number of patients attending the group:  7  Group Length:  2 Hours    Group Therapy Type: Recovery strategies, Emotion processing, and Daily living/independence skills    Summary of Group / Topics Discussed:    Recovery Principles, Sober coping skills, Cognitive behavioral therapy skills, Trauma informed care, Disease of addiction, Emotions/expression, and Relapse prevention      Group Attendance:  Attended group session    Patient's response to the group topic/interactions:  cooperative with task and discussed personal experience with topic    Patient appeared to be Actively participating.        Client specific details:  Annika, checked in to primary group by describing current emotions, thought process, and spiritual well being. She stated 2 positive affirmation and a gratitude. Several patients processed current challenges they are experiencing. Facilitator led group discussion on relapse prevention planning. Patients gained knowledge on various resources available to support their recovery goals and skills. Patients discussed needs and resources that will benefit their recovery journey. Each patient had an opportunity to process and provide constructive feedback to peers who shared and support their peer as she presented her assignment.

## 2023-01-12 ENCOUNTER — HOSPITAL ENCOUNTER (OUTPATIENT)
Dept: BEHAVIORAL HEALTH | Facility: CLINIC | Age: 58
Discharge: HOME | End: 2023-01-12
Attending: FAMILY MEDICINE
Payer: COMMERCIAL

## 2023-01-12 PROCEDURE — 1002N00001 HC LODGING PLUS FACILITY CHARGE ADULT

## 2023-01-12 PROCEDURE — H2035 A/D TX PROGRAM, PER HOUR: HCPCS | Mod: HQ

## 2023-01-12 NOTE — GROUP NOTE
Group Therapy Documentation    PATIENT'S NAME: Annika Garrido  MRN:   [id number]  :   1965  [id number]  DATE OF SERVICE: 23  START TIME: 12:30 PM  END TIME:  2:30 PM  FACILITATOR(S): Zeeshan Torres LADC  TOPIC: BEH Group Therapy  Number of patients attending the group:  8  Group Length:  2 Hours    Group Therapy Type: Recovery strategies    Summary of Group / Topics Discussed:    Recovery Principles, Coping/DBT informed care, Trauma informed care, Disease of addiction, Emotions/expression, Leisure explorations/use of leisure time, Relapse prevention, and Self-care activities      Group Attendance:  Attended group session    Patient's response to the group topic/interactions:  cooperative with task    Patient appeared to be Attentive and Engaged.        Client specific details:  Annika participated in afternoon group. She took part in a brief introduction and then listened to and gave positive feedback to her peer's assignment. For the second part of group the group went over recovery resources.

## 2023-01-12 NOTE — GROUP NOTE
Group Therapy Documentation    PATIENT'S NAME: Annika Garrido  MRN:   [id number]  :   1965  [facility] NUMBER: [id number]  DATE OF SERVICE: 23  START TIME:  3:01 PM  END TIME:  4:01 PM  FACILITATOR(S): Vivi Shetty [facility]; Saida Lange; Mabel Victor [facility]; Dave Morales [facility]  TOPIC: BEH Group Therapy  Number of patients attending the group:  22    Group Therapy Type: Recovery strategies    Summary of Group / Topics Discussed:    Sober coping skills, Emotions/expression, and Relapse prevention      Group Attendance:  Attended group session    Patient's response to the group topic/interactions:  cooperative with task    Patient appeared to be Actively participating.        Client specific details:  Annika attended PM skills group. Patient engaged in discussion and participated in sharing feedback to his peers.

## 2023-01-12 NOTE — GROUP NOTE
Group Therapy Documentation    PATIENT'S NAME: Annika Garrido  MRN:   [id number]  :   1965  [id number]  DATE OF SERVICE: 23  START TIME:  9:00 AM  END TIME: 11:00 AM  FACILITATOR(S): Mabel Victor LADC  TOPIC: BEH Group Therapy  Number of patients attending the group:  7  Group Length:  2 Hours    Group Therapy Type: Recovery strategies    Summary of Group / Topics Discussed:    Sober coping skills and Emotions/expression      Group Attendance:  Attended group session    Patient's response to the group topic/interactions:  cooperative with task    Patient appeared to be Actively participating and Engaged.        Client specific details: Patient checked in feeling as being great. Patient participated in discussion on love. She completed a worksheet on emotion, attitude and actions toward recovery or relapse and discussed roadblocks in recovery.

## 2023-01-13 ENCOUNTER — HOSPITAL ENCOUNTER (OUTPATIENT)
Dept: BEHAVIORAL HEALTH | Facility: CLINIC | Age: 58
Discharge: HOME | End: 2023-01-13
Attending: FAMILY MEDICINE
Payer: COMMERCIAL

## 2023-01-13 PROCEDURE — H2035 A/D TX PROGRAM, PER HOUR: HCPCS | Mod: HQ

## 2023-01-13 PROCEDURE — 1002N00001 HC LODGING PLUS FACILITY CHARGE ADULT

## 2023-01-13 NOTE — GROUP NOTE
Group Therapy Documentation    PATIENT'S NAME: Annika Garrido  MRN:   [id number]  :   1965  ACCT. NUMBER: [id number]  DATE OF SERVICE: 23  START TIME: 12:30 PM  END TIME:  2:30 PM  FACILITATOR(S): Francisca Foley LADC  TOPIC: BEH Group Therapy  Number of patients attending the group:  8  Group Length:  2 Hours    Group Therapy Type: Recovery strategies    Summary of Group / Topics Discussed:    Sober coping skills, Leisure explorations/use of leisure time, and Self-care activities      Group Attendance:  Attended group session    Patient's response to the group topic/interactions:  cooperative with task    Patient appeared to be Attentive and Engaged.        Client specific details:  Patient participated in watching a recovery-related documentary and subsequent discussion with her peers. Patient was fully engaged, providing appropriate feedback to peers who shared.

## 2023-01-13 NOTE — GROUP NOTE
Group Therapy Documentation    PATIENT'S NAME: Annika Garrido  MRN:   [id number]  :   1965  ACCT. NUMBER: [id number]  DATE OF SERVICE: 23  START TIME:  9:00 AM  END TIME: 11:00 AM  FACILITATOR(S): Mabel Victor LADC  TOPIC: BEH Group Therapy  Number of patients attending the group: 8  Group Length:  2 Hours    Group Therapy Type: Recovery strategies    Summary of Group / Topics Discussed:    Sober coping skills and Mindfulness/Relaxation      Group Attendance:  Attended group session    Patient's response to the group topic/interactions:  cooperative with task    Patient appeared to be Actively participating and Engaged.        Client specific details: Pt participated in mindful breathing accompanied by music, using channel 47. Patient participated in discussing mindfulness, meditation, and recovery. She checked in feeling as being ok. She shared her completed worksheet on emotion, attitude and actions toward recovery or relapse and participated in discussing anxiety and depression and how these, affect recovery. Patient learnt some coping skills to deal with anxiety and depression.

## 2023-01-14 ENCOUNTER — HOSPITAL ENCOUNTER (OUTPATIENT)
Dept: BEHAVIORAL HEALTH | Facility: CLINIC | Age: 58
Discharge: HOME | End: 2023-01-14
Attending: FAMILY MEDICINE
Payer: COMMERCIAL

## 2023-01-14 PROCEDURE — H2035 A/D TX PROGRAM, PER HOUR: HCPCS | Mod: HQ

## 2023-01-14 PROCEDURE — 1002N00001 HC LODGING PLUS FACILITY CHARGE ADULT

## 2023-01-14 NOTE — GROUP NOTE
Psychoeducation Group Documentation    PATIENT'S NAME: Annika Garrido  MRN:   [id number]  :   1965  ACCT. NUMBER: [id number]  DATE OF SERVICE: 23  START TIME: 12:30 PM  END TIME:  2:30 PM  FACILITATOR(S): Merrill Guardado [facility]; Vivi Shetty LADC; Delmer Louise [facility]  TOPIC: BEH Pyschoeducation  Number of patients attending the group:  11  Group Length:  2 Hours    Skills Group Therapy Type: Recovery skills    Summary of Group / Topics Discussed:    Relapse prevention skills          Group Attendance:  Attended group session    Patient's response to the group topic/interactions:  cooperative with task    Patient appeared to be Attentive.         Client specific details:  Annika gave appropriate feedback..

## 2023-01-14 NOTE — GROUP NOTE
Group Therapy Documentation    PATIENT'S NAME: Annika Garrido  MRN:   [id number]  :   1965  [id number]  DATE OF SERVICE: 23  START TIME:  9:00 AM  END TIME: 11:00 AM  FACILITATOR(S): Merrill Guardado LADC; Delmer Louise LADC  TOPIC: BEH Group Therapy  Number of patients attending the group:  27  Group Length:  2 Hours    Group Therapy Type: Recovery strategies and Emotion processing    Summary of Group / Topics Discussed:    Recovery Principles, Sober coping skills, and Relapse prevention      Group Attendance:  Attended group session    Patient's response to the group topic/interactions:  cooperative with task, discussed personal experience with topic and expressed understanding of topic    Patient appeared to be Actively participating, Attentive and Engaged.        Client specific details: Annika learned about relapse and skills for relapse prevention.

## 2023-01-15 ENCOUNTER — HOSPITAL ENCOUNTER (OUTPATIENT)
Dept: BEHAVIORAL HEALTH | Facility: CLINIC | Age: 58
Discharge: HOME | End: 2023-01-15
Attending: FAMILY MEDICINE
Payer: COMMERCIAL

## 2023-01-15 PROCEDURE — H2035 A/D TX PROGRAM, PER HOUR: HCPCS | Mod: HQ

## 2023-01-15 PROCEDURE — 1002N00001 HC LODGING PLUS FACILITY CHARGE ADULT

## 2023-01-15 NOTE — GROUP NOTE
Psychoeducation Group Documentation    PATIENT'S NAME: Annika Garrido  MRN:   [id number]  :   1965  ACCT. NUMBER: [id number]  DATE OF SERVICE: 1/15/23  START TIME:  8:40 AM  END TIME: 10:30 AM  FACILITATOR(S): Merrill Guardado LADC; Judit Katz LADC; Dione Santana RN  TOPIC: BEH Pyschoeducation  Number of patients attending the group:  11  Group Length:  2 Hours    Skills Group Therapy Type: Healthy behaviors development    Summary of Group / Topics Discussed:    Balanced lifestyle skills          Group Attendance:  Attended group session    Patient's response to the group topic/interactions:  cooperative with task    Patient appeared to be Attentive.         Client specific details:  Annika gave appropriate feedback..

## 2023-01-15 NOTE — GROUP NOTE
Psychoeducation Group Documentation    PATIENT'S NAME: Annika Garrido  MRN:   [id number]  :   1965  ACCT. NUMBER: [id number]  DATE OF SERVICE: 1/15/23  START TIME: 12:30 PM  END TIME:  1:30 PM  FACILITATOR(S): Merrill Guardado LADC; Judit Katz LADC  TOPIC: BEH Pyschoeducation  Number of patients attending the group:  27  Group Length:  1 Hours    Skills Group Therapy Type: Healthy behaviors development    Summary of Group / Topics Discussed:    Relationship/social skills          Group Attendance:  Attended group session    Patient's response to the group topic/interactions:  cooperative with task    Patient appeared to be Attentive.         Client specific details:  Annika gave appropriate feedback..

## 2023-01-16 ENCOUNTER — HOSPITAL ENCOUNTER (OUTPATIENT)
Dept: BEHAVIORAL HEALTH | Facility: CLINIC | Age: 58
Discharge: HOME | End: 2023-01-16
Attending: FAMILY MEDICINE
Payer: COMMERCIAL

## 2023-01-16 DIAGNOSIS — F19.20 CHEMICAL DEPENDENCY (H): ICD-10-CM

## 2023-01-16 LAB — SARS-COV-2 RNA RESP QL NAA+PROBE: NEGATIVE

## 2023-01-16 PROCEDURE — U0005 INFEC AGEN DETEC AMPLI PROBE: HCPCS

## 2023-01-16 PROCEDURE — 1002N00001 HC LODGING PLUS FACILITY CHARGE ADULT

## 2023-01-16 PROCEDURE — H2035 A/D TX PROGRAM, PER HOUR: HCPCS | Mod: HQ

## 2023-01-16 NOTE — PROGRESS NOTES
Nursing Discharge Planning Meeting    Writer completed discharge planning meeting with patient. Discharge is planned for Tuesday, 1/17/23    Discussed appropriate follow up care to manage TONIA/MH/medical and to obtain medication refills. Patient given a copy of their current medications for reference. Questions were answered at this time and the patient verbalized an understanding of the post-discharge follow up plan.    Patient will f/u with PCP as recommended and/or is aware of upcoming appt:  Home clinic/MD: PCP also manages HIV medication management  [facility]  Yesenia Fragoso MD    [address] [facility] [address] [place] [address]    Phone: [phone]    Fax: [phone]    Patient will f/u with Psychiatry as recommended and/or is aware of upcoming appt:  Psychiatrist/therapist:   Dr. Delgadillo, CNP  [facility]  [address] [place] [address] [address]  [place] [address]  Phone: [phone]  Fax: [phone]    Continue to support patient in discharge planning as needed to assure appropriate continuity of care.     Tobacco Cessation  Patient participated in the nicotine replacement therapy for tobacco cessation or reduction during their treatment programming: No    The patient was provided with community resources for follow-up to continue tobacco cessation support once in the community. Also the patient was encouraged to discuss their tobacco cessation efforts with the primary care provider.

## 2023-01-16 NOTE — GROUP NOTE
Group Therapy Documentation    PATIENT'S NAME: Annika Garrido  MRN:   [id number]  :   1965  [id number]  DATE OF SERVICE: 23  START TIME: 12;30 PM  END TIME: 2;30 PM  FACILITATOR(S): Saida Lange  TOPIC: BEH Group Therapy  Number of patients attending the group:  10    Group Length:  2 Hours    Group Therapy Type: Recovery strategies, Emotion processing, and Daily living/independence skills    Summary of Group / Topics Discussed:    Recovery Principles, Sober coping skills, Relationship/socialization, and Relapse prevention      Group Attendance:  Attended group session    Patient's response to the group topic/interactions:  cooperative with task    Patient appeared to be Actively participating, Attentive and Engaged.        Client specific details:  Patient attended group session and.was attentive and participative.

## 2023-01-17 NOTE — PROGRESS NOTES
[facility] Discharge Summary/Instructions     Patient: Annika Garrido  MRN: [id number]   : 1965 Age: 57 year old Sex: female   -  Focus of Treatment / Discharge Recommendations    Personal Safety/ Management of Symptoms    * Follow your safety plan.  Report increased symptoms to your care team                    and /or go to the nearest Emergency Department.    * Call crisis lines as needed    [facility] [phone]                [facility] [phone]  [facility] [phone]              [facility] [phone]  [facility] [phone]              [facility] [phone]  [facility] [phone]          National Suicide Prevention [phone]  [facility] [phone]            Suicide Prevention [phone]  [facility] [phone]    Abstinence/Relapse Prevention  * Take all medicines as directed.  Carry a current list of medicines with you.  * Use coping skills: Ongoing throughout recovery  * Do not use illicit (street) drugs, controlled substances (narcotics) or alcohol.    Develop/Improve Independent Living/Socialization Skills:     Community Resources/Supports and Discharge Planning:    Follow up with psychiatrist / main caregiver: [facility]    Next visit: TBD    Follow up with your therapist: TBD   Next visit: TBD    Go to group therapy and / or support groups at: TBD    See your medical doctor about:  WILBERTO    Other:      Client Signature:_______________________   Date / Time:___________    Staff Signature:________________________   Date / Time:___________
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
## 2023-01-18 NOTE — ADDENDUM NOTE
Encounter addended by: Mabel Victor, [facility] on: 1/18/2023 12:58 PM   Actions taken: Pend clinical note, Clinical Note Signed

## 2023-06-05 NOTE — GROUP NOTE
"Group Therapy Documentation    PATIENT'S NAME: Annika Garrido  MRN:   [id number]  :   1965  ACCT. NUMBER: [id number]  DATE OF SERVICE: 22  START TIME: 12:30 PM  END TIME:  2:30 PM  FACILITATOR(S): Roger Smart; Saida Lange  TOPIC: BEH Group Therapy  Number of patients attending the group:  10  Group Length:  2 Hours    Group Therapy Type: Recovery strategies, Emotion processing, and Health and wellbeing     Summary of Group / Topics Discussed:    Recovery Principles, Sober coping skills, Relationship/socialization, and Relapse prevention      Group Attendance:  Attended group session    Patient's response to the group topic/interactions:  cooperative with task, discussed personal experience with topic and expressed understanding of topic    Patient appeared to be Actively participating, Attentive and Engaged.        Client specific details:  Recovery group held on Step 1.  All patients participated in discussion of Step 1 and where they personally were at with the step.  Group also reviewed and discussed the song, \"Luis Heroin\".  Patient reported she was ready to be sober.      " Gabapentin Counseling: I discussed with the patient the risks of gabapentin including but not limited to dizziness, somnolence, fatigue and ataxia.

## 2023-06-13 NOTE — PLAN OF CARE
Problem: Patient Care Overview  Goal: Plan of Care/Patient Progress Review  Outcome: No Change  POD0. Lethargic  through most of shift, sleeping throughout, arousable to voice. Oriented x4 with some forgetfulness. Slight weakness noted in BLE. Denied numbness and tingling.  Bowel sounds hypoactive, (-) flatus. VSS on 2L O2 via NC. Slight drainage noted to dressing on lower back, marked with no expansion noted throughout shift. C/D/I. SHANIQUE drain intact with 60mL output. Ingram patent. Cont. Capno WNL.  Remained in bed throughout shift. C/o lower back pain, decreased with PCA Dilaudid, warm pack, and scheduled Tylenol. Discharge plan pending, continue monitoring.       Awake

## 2025-02-04 NOTE — MR AVS SNAPSHOT
After Visit Summary   9/10/2018    Annika Garrido    MRN: [id number]           Patient Information     Date Of Birth          1965        Visit Information        Provider Department      9/10/2018 11:20 AM Jeison Quinn MD [facility]        Today's Diagnoses     S/P lumbar fusion          Care Instructions    1. Order for physical therapy  2. Follow up in 3 months with Hien or Roni Torres prior.  3. Please call our clinic with any questions or concerns: [phone]            Follow-ups after your visit        Additional Services     [facility] PT, HAND, AND CHIROPRACTIC REFERRAL       **This order will print in the [facility] Scheduling Office**    Physical Therapy, Hand Therapy and Chiropractic Care are available through:    *[facility]  *[facility]  *[facility]    Call one number to schedule at any of the above locations: [phone].    Your provider has referred you to: Physical Therapy at [facility] or [facility]    Indication/Reason for Referral: s/p lumbar fusion  Onset of Illness:   Therapy Orders: Evaluate and Treat  Special Programs: None  Special Request: Nelson Cooney      Additional Comments for the Therapist or Chiropractor:     Please be aware that coverage of these services is subject to the terms and limitations of your health insurance plan.  Call member services at your health plan with any benefit or coverage questions.      Please bring the following to your appointment:    *Your personal calendar for scheduling future appointments  *Comfortable clothing                  Future tests that were ordered for you today     Open Future Orders        Priority Expected Expires Ordered    XR Lumbar Spine 2/3 Views Routine 9/10/2018 9/10/2019 9/10/2018            Who to contact     If you have questions or need follow up information about today's clinic visit or your schedule please contact [facility] [place] [facility] directly at [phone].  Normal or non-critical lab and imaging results will be communicated to you by MyChart, letter or phone within 4 business days after the clinic has received the results. If you do not hear from us within 7 days, please contact the clinic through MyChart or phone. If you have a critical or abnormal lab result, we will notify you by phone as soon as possible.  Submit refill requests through [employer] or call your pharmacy and they will forward the refill request to us. Please allow 3 business days for your refill to be completed.          Additional Information About Your Visit        Care EveryWhere ID     This is your Care EveryWhere ID. This could be used by other organizations to access your [place] medical records  [id number]        Your Vitals Were     Pulse Temperature Last Period Pulse Oximetry Breastfeeding?       65 97.7  F (36.5  C) (Oral) (LMP Unknown) 100% No        Blood Pressure from Last 3 Encounters:   09/10/18 (!) 171/100   07/25/18 (!) 132/97   06/22/18 109/71    Weight from Last 3 Encounters:   06/22/18 177 lb (80.3 kg)   06/10/18 182 lb (82.6 kg)              We Performed the Following     [facility] PT, HAND, AND CHIROPRACTIC REFERRAL          Today's Medication Changes          These changes are accurate as of 9/10/18 11:50 AM.  If you have any questions, ask your nurse or doctor.               These medicines have changed or have updated prescriptions.        Dose/Directions    * oxyCODONE IR 5 MG tablet   Commonly known as:  ROXICODONE   This may have changed:  Another medication with the same name was changed. Make sure you understand how and when to take each.   Used for:  S/P lumbar fusion   Changed by:  Jeison Quinn MD        Dose:  5 mg   Take 1 tablet (5 mg) by mouth every 8 hours as needed for severe pain Max of 3 tabs per day   Quantity:  30 tablet   Refills:  0       * oxyCODONE IR 5 MG tablet   Commonly known as:  ROXICODONE    This may have changed:  how much to take   Used for:  S/P lumbar fusion   Changed by:  Jeison Quinn MD        Dose:  5 mg   Take 1 tablet (5 mg) by mouth every 6 hours as needed for other (pain control or improvement in physical function. Hold dose for analgesic side effects.)   Quantity:  40 tablet   Refills:  0       * Notice:  This list has 2 medication(s) that are the same as other medications prescribed for you. Read the directions carefully, and ask your doctor or other care provider to review them with you.         Where to get your medicines      Some of these will need a paper prescription and others can be bought over the counter.  Ask your nurse if you have questions.     Bring a paper prescription for each of these medications     oxyCODONE IR 5 MG tablet               Information about OPIOIDS     PRESCRIPTION OPIOIDS: WHAT YOU NEED TO KNOW   We gave you an opioid (narcotic) pain medicine. It is important to manage your pain, but opioids are not always the best choice. You should first try all the other options your care team gave you. Take this medicine for as short a time (and as few doses) as possible.    Some activities can increase your pain, such as bandage changes or therapy sessions. It may help to take your pain medicine 30 to 60 minutes before these activities. Reduce your stress by getting enough sleep, working on hobbies you enjoy and practicing relaxation or meditation. Talk to your care team about ways to manage your pain beyond prescription opioids.    These medicines have risks:    DO NOT drive when on new or higher doses of pain medicine. These medicines can affect your alertness and reaction times, and you could be arrested for driving under the influence (DUI). If you need to use opioids long-term, talk to your care team about driving.    DO NOT operate heavy machinery    DO NOT do any other dangerous activities while taking these medicines.    DO NOT drink any alcohol while  taking these medicines.     If the opioid prescribed includes acetaminophen, DO NOT take with any other medicines that contain acetaminophen. Read all labels carefully. Look for the word  acetaminophen  or  Tylenol.  Ask your pharmacist if you have questions or are unsure.    You can get addicted to pain medicines, especially if you have a history of addiction (chemical, alcohol or substance dependence). Talk to your care team about ways to reduce this risk.    All opioids tend to cause constipation. Drink plenty of water and eat foods that have a lot of fiber, such as fruits, vegetables, prune juice, apple juice and high-fiber cereal. Take a laxative (Miralax, milk of magnesia, Colace, Senna) if you don t move your bowels at least every other day. Other side effects include upset stomach, sleepiness, dizziness, throwing up, tolerance (needing more of the medicine to have the same effect), physical dependence and slowed breathing.    Store your pills in a secure place, locked if possible. We will not replace any lost or stolen medicine. If you don t finish your medicine, please throw away (dispose) as directed by your pharmacist. The [place] [employer] has more information about safe disposal: https://www.pca.[facility].[place].us/living-green/managing-unwanted-medications         Primary Care Provider Office Phone # Fax #    [facility] [phone]       [address] [place] [address]        Equal Access to Services     CASS HERNANDEZ : Cici john Sojake, waaxda luangelica, qaybta kaalsana muñoz. So [facility] [phone].    ATENCIÓN: Si habla español, tiene a spann disposición servicios gratuitos de asistencia lingüística. Rajan al [phone].    We comply with applicable federal civil rights laws and [place] laws. We do not discriminate on the basis of race, color, national origin, age, disability, sex, sexual  orientation, or gender identity.            Thank you!     Thank you for choosing [facility] [place]  for your care. Our goal is always to provide you with excellent care. Hearing back from our patients is one way we can continue to improve our services. Please take a few minutes to complete the written survey that you may receive in the mail after your visit with us. Thank you!             Your Updated Medication List - Protect others around you: Learn how to safely use, store and throw away your medicines at www.disposemymeds.org.          This list is accurate as of 9/10/18 11:50 AM.  Always use your most recent med list.                   Brand Name Dispense Instructions for use Diagnosis    albuterol 108 (90 Base) MCG/ACT inhaler    PROAIR HFA/PROVENTIL HFA/VENTOLIN HFA     Inhale 1-2 puffs into the lungs every 4 hours as needed for shortness of breath / dyspnea or wheezing        ciclopirox 8 % Soln      Apply to adjacent skin and affected nails daily.  Remove with alcohol every 7 days, then repeat.        clonazePAM 1 MG tablet    klonoPIN     Take 1 tablet (1 mg) by mouth in the morning and at bedtime    Anxiety disorder, unspecified type       clotrimazole 1 % cream    LOTRIMIN     Apply topically 2 times daily To the affected toe.        cyclobenzaprine 10 MG tablet    FLEXERIL    50 tablet    Take 0.5-1 tablets (5-10 mg) by mouth 3 times daily as needed for muscle spasms    S/P lumbar fusion       dolutegravir 50 MG tablet    TIVICAY     Take 50 mg by mouth daily        emtricitabine-tenofovir 200-300 MG per tablet    TRUVADA     Take 1 tablet by mouth daily        ketoconazole 2 % shampoo    NIZORAL     Apply topically daily as needed for itching or irritation        nicotine polacrilex 2 MG lozenge    COMMIT    360 lozenge    Place 1 lozenge (2 mg) inside cheek every hour as needed for smoking cessation    Tobacco use disorder       * oxyCODONE IR 5 MG tablet    ROXICODONE    30  tablet    Take 1 tablet (5 mg) by mouth every 8 hours as needed for severe pain Max of 3 tabs per day    S/P lumbar fusion       * oxyCODONE IR 5 MG tablet    ROXICODONE    40 tablet    Take 1 tablet (5 mg) by mouth every 6 hours as needed for other (pain control or improvement in physical function. Hold dose for analgesic side effects.)    S/P lumbar fusion       polyethylene glycol Packet    MIRALAX/GLYCOLAX    7 packet    Take 17 g by mouth daily as needed for constipation    S/P lumbar fusion       QUETIAPINE FUMARATE PO      Take 200 mg by mouth At Bedtime        SERTRALINE HCL PO      Take 100 mg by mouth daily        * Notice:  This list has 2 medication(s) that are the same as other medications prescribed for you. Read the directions carefully, and ask your doctor or other care provider to review them with you.       Instructions: This plan will send the code FBSE to the PM system.  DO NOT or CHANGE the price. Price (Do Not Change): 0.00 Detail Level: Simple

## 2025-06-13 NOTE — PROGRESS NOTES
[address] [place] [address]          Annika Garrido, 1965, was admitted for evaluation/treatment of chemical dependency at [facility].  This person took part in these program(s):    ______ The Inpatient Program   ______ The Outpatient Program   ___X___ The Lodging Plus Program   ______ Lodging Day Outpatient       Date admitted: 12/20/22    Date discharged: 1/17/23    Type of discharge:   __X____ Satisfactory - completed evaluation / treatment   ______ Discharged without completing   ______ Behavioral discharge   ______ Transferred to another chemical dependency program   ______ Transferred to another type of service   ______ Left against medical advice (AMA) / Eloped       Comments:       Counselor: Saida Lange                       Date: 1/17/2023             Time: 9:27 AM                                             [de-identified] : 6/13/25 AF @ 73 bpm  ECG 2/28/25: atrial fibrillation with ventricular rate of 62 bpm [de-identified] : 1/15-1/21/25: Atrial Fibrillation 100%, Pauses 645 up to 3.9 seconds

## (undated) DEVICE — RX SURGIFLO W/THROMBIN KIT 2ML 1991

## (undated) DEVICE — PREP DURAPREP 26ML APL 8630

## (undated) DEVICE — SU VICRYL 0 CT-1 CR 8X18" J740D

## (undated) DEVICE — DRAPE COVER C-ARM SEAMLESS SNAP-KAP 03-KP26 LATEX FREE

## (undated) DEVICE — NDL SPINAL 18GA 3.5" 405184

## (undated) DEVICE — GLOVE PROTEXIS BLUE W/NEU-THERA 6.5  2D73EB65

## (undated) DEVICE — PACK LARGE SPINE SNE15LSFSE

## (undated) DEVICE — LINEN TOWEL PACK X5 5464

## (undated) DEVICE — ESU GROUND PAD UNIVERSAL W/O CORD

## (undated) DEVICE — MARKER SPHERES PASSIVE MEDT PACK 5 8801075

## (undated) DEVICE — SOL NACL 0.9% INJ 250ML BAG 2B1322Q

## (undated) DEVICE — ESU PENCIL W/HOLSTER E2350H

## (undated) DEVICE — SU VICRYL 2-0 CT-2 27" J333H

## (undated) DEVICE — Device

## (undated) DEVICE — NDL 20GA 1.5"

## (undated) DEVICE — DRSG TELFA ISLAND 4X10"

## (undated) DEVICE — PACK UNIVERSAL SPLIT 29131

## (undated) DEVICE — GLOVE PROTEXIS BLUE W/NEU-THERA 8.0  2D73EB80

## (undated) DEVICE — GLOVE PROTEXIS W/NEU-THERA 6.5  2D73TE65

## (undated) DEVICE — SU ETHILON 2-0 FS 18" 664H

## (undated) DEVICE — DRAPE MICROSCOPE EQUIP 48X120" 6130VL2

## (undated) DEVICE — SOL WATER IRRIG 1000ML BOTTLE 2F7114

## (undated) DEVICE — DRAPE IOBAN INCISE 23X17" 6650EZ

## (undated) DEVICE — TUBING SUCTION SOFT 20'X3/16" 0036570

## (undated) DEVICE — SPONGE SURGIFOAM 100 1974

## (undated) DEVICE — ESU BIPOLAR SEALER AQUAMANTYS 6MM 23-112-1

## (undated) DEVICE — ESU ELEC BLADE 2.75" COATED/INSULATED E1455

## (undated) DEVICE — SU VICRYL 2-0 CT-2 CR 8X18" J726D

## (undated) DEVICE — CUSHION INSERT LG PRONE VIEW JACKSON TABLE

## (undated) DEVICE — COVER TABLE POLY 65X90" 8186

## (undated) DEVICE — SUCTION FRAZIER 12FR W/HANDLE K73

## (undated) DEVICE — DRSG KERLIX FLUFFS X5

## (undated) DEVICE — DRAPE SHEET REV FOLD 3/4 9349

## (undated) DEVICE — MIDAS REX DISSECTING TOOL  14MH30

## (undated) DEVICE — MANIFOLD NEPTUNE 4 PORT 700-20

## (undated) DEVICE — GLOVE PROTEXIS W/NEU-THERA 7.5  2D73TE75

## (undated) DEVICE — BLADE BONE MILL STRK 3.2MM FINE 5400-702-000

## (undated) RX ORDER — FENTANYL CITRATE 50 UG/ML
INJECTION, SOLUTION INTRAMUSCULAR; INTRAVENOUS
Status: DISPENSED
Start: 2018-06-10

## (undated) RX ORDER — DEXAMETHASONE SODIUM PHOSPHATE 4 MG/ML
INJECTION, SOLUTION INTRA-ARTICULAR; INTRALESIONAL; INTRAMUSCULAR; INTRAVENOUS; SOFT TISSUE
Status: DISPENSED
Start: 2018-06-10

## (undated) RX ORDER — NEOSTIGMINE METHYLSULFATE 1 MG/ML
VIAL (ML) INJECTION
Status: DISPENSED
Start: 2018-06-10

## (undated) RX ORDER — HYDROMORPHONE HYDROCHLORIDE 1 MG/ML
INJECTION, SOLUTION INTRAMUSCULAR; INTRAVENOUS; SUBCUTANEOUS
Status: DISPENSED
Start: 2018-06-10

## (undated) RX ORDER — ONDANSETRON 2 MG/ML
INJECTION INTRAMUSCULAR; INTRAVENOUS
Status: DISPENSED
Start: 2018-06-10

## (undated) RX ORDER — LIDOCAINE HYDROCHLORIDE 20 MG/ML
INJECTION, SOLUTION EPIDURAL; INFILTRATION; INTRACAUDAL; PERINEURAL
Status: DISPENSED
Start: 2018-06-10

## (undated) RX ORDER — CEFAZOLIN SODIUM 2 G/100ML
INJECTION, SOLUTION INTRAVENOUS
Status: DISPENSED
Start: 2018-06-10

## (undated) RX ORDER — CEFAZOLIN SODIUM 1 G/3ML
INJECTION, POWDER, FOR SOLUTION INTRAMUSCULAR; INTRAVENOUS
Status: DISPENSED
Start: 2018-06-10

## (undated) RX ORDER — GINSENG 100 MG
CAPSULE ORAL
Status: DISPENSED
Start: 2018-06-10

## (undated) RX ORDER — ALBUTEROL SULFATE 0.83 MG/ML
SOLUTION RESPIRATORY (INHALATION)
Status: DISPENSED
Start: 2018-06-10

## (undated) RX ORDER — BUPIVACAINE HYDROCHLORIDE AND EPINEPHRINE 5; 5 MG/ML; UG/ML
INJECTION, SOLUTION EPIDURAL; INTRACAUDAL; PERINEURAL
Status: DISPENSED
Start: 2018-06-10

## (undated) RX ORDER — GLYCOPYRROLATE 0.2 MG/ML
INJECTION, SOLUTION INTRAMUSCULAR; INTRAVENOUS
Status: DISPENSED
Start: 2018-06-10